# Patient Record
Sex: FEMALE | Race: BLACK OR AFRICAN AMERICAN | NOT HISPANIC OR LATINO
[De-identification: names, ages, dates, MRNs, and addresses within clinical notes are randomized per-mention and may not be internally consistent; named-entity substitution may affect disease eponyms.]

---

## 2017-05-04 ENCOUNTER — APPOINTMENT (OUTPATIENT)
Dept: NEPHROLOGY | Facility: CLINIC | Age: 52
End: 2017-05-04

## 2017-05-04 VITALS
DIASTOLIC BLOOD PRESSURE: 70 MMHG | BODY MASS INDEX: 34.07 KG/M2 | TEMPERATURE: 97.8 F | RESPIRATION RATE: 16 BRPM | HEART RATE: 88 BPM | SYSTOLIC BLOOD PRESSURE: 104 MMHG | WEIGHT: 230 LBS | HEIGHT: 69 IN

## 2017-05-04 VITALS — SYSTOLIC BLOOD PRESSURE: 108 MMHG | DIASTOLIC BLOOD PRESSURE: 66 MMHG

## 2017-05-07 LAB
ALBUMIN SERPL ELPH-MCNC: 3.8 G/DL
ANION GAP SERPL CALC-SCNC: 14 MMOL/L
BUN SERPL-MCNC: 12 MG/DL
CALCIUM SERPL-MCNC: 9.6 MG/DL
CHLORIDE SERPL-SCNC: 101 MMOL/L
CO2 SERPL-SCNC: 26 MMOL/L
CREAT SERPL-MCNC: 1.06 MG/DL
CREAT SPEC-SCNC: 234 MG/DL
CREAT/PROT UR: 0.1 RATIO
GLUCOSE SERPL-MCNC: 74 MG/DL
PHOSPHATE SERPL-MCNC: 2 MG/DL
POTASSIUM SERPL-SCNC: 3.5 MMOL/L
PROT UR-MCNC: 16 MG/DL
SODIUM SERPL-SCNC: 141 MMOL/L

## 2017-05-08 ENCOUNTER — RESULT REVIEW (OUTPATIENT)
Age: 52
End: 2017-05-08

## 2017-05-12 ENCOUNTER — APPOINTMENT (OUTPATIENT)
Dept: ORTHOPEDIC SURGERY | Facility: CLINIC | Age: 52
End: 2017-05-12

## 2017-05-12 VITALS — SYSTOLIC BLOOD PRESSURE: 116 MMHG | HEART RATE: 66 BPM | DIASTOLIC BLOOD PRESSURE: 74 MMHG

## 2017-05-12 VITALS — WEIGHT: 225 LBS | BODY MASS INDEX: 33.33 KG/M2 | HEIGHT: 69 IN

## 2017-05-12 DIAGNOSIS — S56.912A STRAIN OF UNSPECIFIED MUSCLES, FASCIA AND TENDONS AT FOREARM LEVEL, LEFT ARM, INITIAL ENCOUNTER: ICD-10-CM

## 2017-08-21 ENCOUNTER — APPOINTMENT (OUTPATIENT)
Dept: PAIN MANAGEMENT | Facility: CLINIC | Age: 52
End: 2017-08-21
Payer: COMMERCIAL

## 2017-08-21 VITALS
WEIGHT: 225 LBS | BODY MASS INDEX: 33.33 KG/M2 | HEIGHT: 69 IN | DIASTOLIC BLOOD PRESSURE: 76 MMHG | HEART RATE: 67 BPM | SYSTOLIC BLOOD PRESSURE: 145 MMHG

## 2017-08-21 PROCEDURE — 99204 OFFICE O/P NEW MOD 45 MIN: CPT

## 2017-08-25 ENCOUNTER — TRANSCRIPTION ENCOUNTER (OUTPATIENT)
Age: 52
End: 2017-08-25

## 2017-09-25 ENCOUNTER — APPOINTMENT (OUTPATIENT)
Dept: MAMMOGRAPHY | Facility: IMAGING CENTER | Age: 52
End: 2017-09-25
Payer: COMMERCIAL

## 2017-09-25 ENCOUNTER — OUTPATIENT (OUTPATIENT)
Dept: OUTPATIENT SERVICES | Facility: HOSPITAL | Age: 52
LOS: 1 days | End: 2017-09-25
Payer: COMMERCIAL

## 2017-09-25 ENCOUNTER — APPOINTMENT (OUTPATIENT)
Dept: ULTRASOUND IMAGING | Facility: IMAGING CENTER | Age: 52
End: 2017-09-25
Payer: COMMERCIAL

## 2017-09-25 DIAGNOSIS — Z00.8 ENCOUNTER FOR OTHER GENERAL EXAMINATION: ICD-10-CM

## 2017-09-25 PROCEDURE — G0202: CPT | Mod: 26

## 2017-09-25 PROCEDURE — 77063 BREAST TOMOSYNTHESIS BI: CPT | Mod: 26

## 2017-09-25 PROCEDURE — 77067 SCR MAMMO BI INCL CAD: CPT

## 2017-09-25 PROCEDURE — 76641 ULTRASOUND BREAST COMPLETE: CPT | Mod: 26,50

## 2017-09-25 PROCEDURE — 76641 ULTRASOUND BREAST COMPLETE: CPT

## 2017-09-25 PROCEDURE — 77063 BREAST TOMOSYNTHESIS BI: CPT

## 2017-10-02 ENCOUNTER — APPOINTMENT (OUTPATIENT)
Dept: PAIN MANAGEMENT | Facility: CLINIC | Age: 52
End: 2017-10-02
Payer: COMMERCIAL

## 2017-10-02 VITALS
BODY MASS INDEX: 33.33 KG/M2 | HEART RATE: 77 BPM | HEIGHT: 69 IN | WEIGHT: 225 LBS | SYSTOLIC BLOOD PRESSURE: 123 MMHG | DIASTOLIC BLOOD PRESSURE: 86 MMHG

## 2017-10-02 PROCEDURE — 99213 OFFICE O/P EST LOW 20 MIN: CPT

## 2017-10-17 ENCOUNTER — CHART COPY (OUTPATIENT)
Age: 52
End: 2017-10-17

## 2017-10-28 ENCOUNTER — OUTPATIENT (OUTPATIENT)
Dept: OUTPATIENT SERVICES | Facility: HOSPITAL | Age: 52
LOS: 1 days | End: 2017-10-28
Payer: COMMERCIAL

## 2017-10-28 ENCOUNTER — APPOINTMENT (OUTPATIENT)
Dept: MRI IMAGING | Facility: CLINIC | Age: 52
End: 2017-10-28
Payer: COMMERCIAL

## 2017-10-28 DIAGNOSIS — Z00.8 ENCOUNTER FOR OTHER GENERAL EXAMINATION: ICD-10-CM

## 2017-10-28 PROCEDURE — 72148 MRI LUMBAR SPINE W/O DYE: CPT | Mod: 26

## 2017-10-28 PROCEDURE — 72148 MRI LUMBAR SPINE W/O DYE: CPT

## 2017-12-06 ENCOUNTER — APPOINTMENT (OUTPATIENT)
Dept: PAIN MANAGEMENT | Facility: CLINIC | Age: 52
End: 2017-12-06
Payer: COMMERCIAL

## 2017-12-06 VITALS
WEIGHT: 216 LBS | DIASTOLIC BLOOD PRESSURE: 85 MMHG | HEIGHT: 66 IN | SYSTOLIC BLOOD PRESSURE: 135 MMHG | BODY MASS INDEX: 34.72 KG/M2 | HEART RATE: 77 BPM

## 2017-12-06 PROCEDURE — 99214 OFFICE O/P EST MOD 30 MIN: CPT

## 2017-12-13 ENCOUNTER — APPOINTMENT (OUTPATIENT)
Dept: PAIN MANAGEMENT | Facility: CLINIC | Age: 52
End: 2017-12-13

## 2018-01-22 ENCOUNTER — APPOINTMENT (OUTPATIENT)
Dept: ORTHOPEDIC SURGERY | Facility: CLINIC | Age: 53
End: 2018-01-22
Payer: COMMERCIAL

## 2018-01-22 VITALS
HEIGHT: 69 IN | DIASTOLIC BLOOD PRESSURE: 82 MMHG | HEART RATE: 74 BPM | SYSTOLIC BLOOD PRESSURE: 140 MMHG | WEIGHT: 216 LBS | BODY MASS INDEX: 31.99 KG/M2

## 2018-01-22 PROCEDURE — 99214 OFFICE O/P EST MOD 30 MIN: CPT

## 2018-06-15 ENCOUNTER — APPOINTMENT (OUTPATIENT)
Dept: GASTROENTEROLOGY | Facility: CLINIC | Age: 53
End: 2018-06-15
Payer: COMMERCIAL

## 2018-06-15 VITALS
BODY MASS INDEX: 34.07 KG/M2 | OXYGEN SATURATION: 98 % | WEIGHT: 230 LBS | DIASTOLIC BLOOD PRESSURE: 80 MMHG | RESPIRATION RATE: 16 BRPM | TEMPERATURE: 98 F | HEART RATE: 88 BPM | SYSTOLIC BLOOD PRESSURE: 130 MMHG | HEIGHT: 69 IN

## 2018-06-15 DIAGNOSIS — Z80.1 FAMILY HISTORY OF MALIGNANT NEOPLASM OF TRACHEA, BRONCHUS AND LUNG: ICD-10-CM

## 2018-06-15 PROCEDURE — 99213 OFFICE O/P EST LOW 20 MIN: CPT

## 2018-06-29 ENCOUNTER — APPOINTMENT (OUTPATIENT)
Dept: GASTROENTEROLOGY | Facility: CLINIC | Age: 53
End: 2018-06-29
Payer: COMMERCIAL

## 2018-06-29 VITALS
OXYGEN SATURATION: 99 % | WEIGHT: 230 LBS | HEART RATE: 68 BPM | HEIGHT: 69 IN | BODY MASS INDEX: 34.07 KG/M2 | DIASTOLIC BLOOD PRESSURE: 70 MMHG | SYSTOLIC BLOOD PRESSURE: 130 MMHG | TEMPERATURE: 98.7 F

## 2018-06-29 PROCEDURE — 99213 OFFICE O/P EST LOW 20 MIN: CPT

## 2018-08-01 ENCOUNTER — RECORD ABSTRACTING (OUTPATIENT)
Age: 53
End: 2018-08-01

## 2018-08-08 ENCOUNTER — RX RENEWAL (OUTPATIENT)
Age: 53
End: 2018-08-08

## 2018-08-21 ENCOUNTER — APPOINTMENT (OUTPATIENT)
Dept: PULMONOLOGY | Facility: CLINIC | Age: 53
End: 2018-08-21
Payer: COMMERCIAL

## 2018-08-21 VITALS
DIASTOLIC BLOOD PRESSURE: 85 MMHG | BODY MASS INDEX: 34.07 KG/M2 | RESPIRATION RATE: 16 BRPM | WEIGHT: 230 LBS | HEIGHT: 69 IN | SYSTOLIC BLOOD PRESSURE: 141 MMHG | HEART RATE: 71 BPM | TEMPERATURE: 98.2 F | OXYGEN SATURATION: 100 %

## 2018-08-21 LAB
ALBUMIN: 80
BILIRUB UR QL STRIP: NEGATIVE
CLARITY UR: CLEAR
COLLECTION METHOD: NORMAL
CREATININE: 300
GLUCOSE UR-MCNC: NEGATIVE
HCG UR QL: 0.2 EU/DL
HGB UR QL STRIP.AUTO: NEGATIVE
KETONES UR-MCNC: NEGATIVE
LEUKOCYTE ESTERASE UR QL STRIP: NEGATIVE
MICROALBUMIN/CREAT UR TEST STR-RTO: NORMAL
NITRITE UR QL STRIP: NEGATIVE
PH UR STRIP: 5.5
PROT UR STRIP-MCNC: 30
SP GR UR STRIP: 1.03

## 2018-08-21 PROCEDURE — 99396 PREV VISIT EST AGE 40-64: CPT | Mod: 25

## 2018-08-21 PROCEDURE — 71046 X-RAY EXAM CHEST 2 VIEWS: CPT

## 2018-08-21 PROCEDURE — 94060 EVALUATION OF WHEEZING: CPT

## 2018-08-21 PROCEDURE — 82044 UR ALBUMIN SEMIQUANTITATIVE: CPT | Mod: QW

## 2018-08-21 PROCEDURE — 81002 URINALYSIS NONAUTO W/O SCOPE: CPT

## 2018-08-21 PROCEDURE — 93000 ELECTROCARDIOGRAM COMPLETE: CPT

## 2018-08-21 PROCEDURE — 36415 COLL VENOUS BLD VENIPUNCTURE: CPT

## 2018-08-21 PROCEDURE — 82570 ASSAY OF URINE CREATININE: CPT | Mod: QW

## 2018-08-21 PROCEDURE — 77080 DXA BONE DENSITY AXIAL: CPT

## 2018-08-21 RX ORDER — HYDROCORTISONE ACETATE, PRAMOXINE HCL 2.5; 1 G/100G; G/100G
2.5-1 CREAM TOPICAL 3 TIMES DAILY
Qty: 2 | Refills: 2 | Status: DISCONTINUED | COMMUNITY
Start: 2018-06-15 | End: 2018-08-21

## 2018-08-21 RX ORDER — PRAMOXINE HYDROCHLORIDE AND HYDROCORTISONE ACETATE 10; 25 MG/G; MG/G
2.5-1 CREAM TOPICAL
Qty: 60 | Refills: 0 | Status: DISCONTINUED | COMMUNITY
Start: 2018-06-15 | End: 2018-08-21

## 2018-08-22 LAB — 25(OH)D3 SERPL-MCNC: 29.1 NG/ML

## 2018-09-04 ENCOUNTER — RESULT CHARGE (OUTPATIENT)
Age: 53
End: 2018-09-04

## 2018-09-05 LAB — HEMOCCULT SP1 STL QL: NEGATIVE

## 2018-10-31 ENCOUNTER — APPOINTMENT (OUTPATIENT)
Dept: ULTRASOUND IMAGING | Facility: IMAGING CENTER | Age: 53
End: 2018-10-31
Payer: COMMERCIAL

## 2018-10-31 ENCOUNTER — OUTPATIENT (OUTPATIENT)
Dept: OUTPATIENT SERVICES | Facility: HOSPITAL | Age: 53
LOS: 1 days | End: 2018-10-31
Payer: COMMERCIAL

## 2018-10-31 ENCOUNTER — APPOINTMENT (OUTPATIENT)
Dept: MAMMOGRAPHY | Facility: IMAGING CENTER | Age: 53
End: 2018-10-31
Payer: COMMERCIAL

## 2018-10-31 DIAGNOSIS — R92.2 INCONCLUSIVE MAMMOGRAM: ICD-10-CM

## 2018-10-31 DIAGNOSIS — Z12.31 ENCOUNTER FOR SCREENING MAMMOGRAM FOR MALIGNANT NEOPLASM OF BREAST: ICD-10-CM

## 2018-10-31 PROCEDURE — 76641 ULTRASOUND BREAST COMPLETE: CPT | Mod: 26,50

## 2018-10-31 PROCEDURE — 77063 BREAST TOMOSYNTHESIS BI: CPT

## 2018-10-31 PROCEDURE — 77063 BREAST TOMOSYNTHESIS BI: CPT | Mod: 26

## 2018-10-31 PROCEDURE — 76641 ULTRASOUND BREAST COMPLETE: CPT

## 2018-10-31 PROCEDURE — 77067 SCR MAMMO BI INCL CAD: CPT | Mod: 26

## 2018-10-31 PROCEDURE — 77067 SCR MAMMO BI INCL CAD: CPT

## 2018-11-27 ENCOUNTER — APPOINTMENT (OUTPATIENT)
Dept: PULMONOLOGY | Facility: CLINIC | Age: 53
End: 2018-11-27
Payer: COMMERCIAL

## 2018-11-27 VITALS
OXYGEN SATURATION: 100 % | RESPIRATION RATE: 16 BRPM | HEART RATE: 72 BPM | DIASTOLIC BLOOD PRESSURE: 89 MMHG | SYSTOLIC BLOOD PRESSURE: 137 MMHG

## 2018-11-27 LAB — POCT - HEMOGLOBIN (HGB), QUANTITATIVE, TRANSCUTANEOUS: 9.6

## 2018-11-27 PROCEDURE — 88738 HGB QUANT TRANSCUTANEOUS: CPT

## 2018-11-27 PROCEDURE — 94727 GAS DIL/WSHOT DETER LNG VOL: CPT

## 2018-11-27 PROCEDURE — 36415 COLL VENOUS BLD VENIPUNCTURE: CPT

## 2018-11-27 PROCEDURE — 90686 IIV4 VACC NO PRSV 0.5 ML IM: CPT

## 2018-11-27 PROCEDURE — 94729 DIFFUSING CAPACITY: CPT

## 2018-11-27 PROCEDURE — G0008: CPT

## 2018-11-27 PROCEDURE — 94060 EVALUATION OF WHEEZING: CPT

## 2018-11-27 PROCEDURE — 99214 OFFICE O/P EST MOD 30 MIN: CPT | Mod: 25

## 2018-11-27 RX ORDER — LOSARTAN POTASSIUM 100 MG/1
100 TABLET, FILM COATED ORAL DAILY
Qty: 90 | Refills: 3 | Status: DISCONTINUED | COMMUNITY
Start: 2017-08-07 | End: 2018-11-27

## 2018-11-27 RX ORDER — BUDESONIDE AND FORMOTEROL FUMARATE DIHYDRATE 80; 4.5 UG/1; UG/1
80-4.5 AEROSOL RESPIRATORY (INHALATION) TWICE DAILY
Refills: 0 | Status: DISCONTINUED | COMMUNITY
End: 2018-11-27

## 2018-11-28 LAB
FERRITIN SERPL-MCNC: 225 NG/ML
FOLATE SERPL-MCNC: 13 NG/ML
IRON SATN MFR SERPL: 43 %
IRON SERPL-MCNC: 127 UG/DL
TIBC SERPL-MCNC: 294 UG/DL
UIBC SERPL-MCNC: 167 UG/DL
VIT B12 SERPL-MCNC: 355 PG/ML

## 2018-12-21 ENCOUNTER — APPOINTMENT (OUTPATIENT)
Dept: ORTHOPEDIC SURGERY | Facility: CLINIC | Age: 53
End: 2018-12-21
Payer: COMMERCIAL

## 2018-12-21 VITALS
DIASTOLIC BLOOD PRESSURE: 64 MMHG | SYSTOLIC BLOOD PRESSURE: 110 MMHG | WEIGHT: 230 LBS | HEIGHT: 69 IN | BODY MASS INDEX: 34.07 KG/M2 | HEART RATE: 88 BPM

## 2018-12-21 PROCEDURE — 99214 OFFICE O/P EST MOD 30 MIN: CPT

## 2019-01-02 ENCOUNTER — OTHER (OUTPATIENT)
Age: 54
End: 2019-01-02

## 2019-01-04 ENCOUNTER — FORM ENCOUNTER (OUTPATIENT)
Age: 54
End: 2019-01-04

## 2019-01-04 ENCOUNTER — APPOINTMENT (OUTPATIENT)
Dept: PULMONOLOGY | Facility: CLINIC | Age: 54
End: 2019-01-04
Payer: COMMERCIAL

## 2019-01-04 VITALS
DIASTOLIC BLOOD PRESSURE: 86 MMHG | RESPIRATION RATE: 16 BRPM | TEMPERATURE: 98.6 F | OXYGEN SATURATION: 100 % | HEART RATE: 79 BPM | SYSTOLIC BLOOD PRESSURE: 139 MMHG

## 2019-01-04 PROCEDURE — 99213 OFFICE O/P EST LOW 20 MIN: CPT | Mod: 25

## 2019-01-04 PROCEDURE — 94060 EVALUATION OF WHEEZING: CPT

## 2019-01-05 ENCOUNTER — APPOINTMENT (OUTPATIENT)
Dept: MRI IMAGING | Facility: CLINIC | Age: 54
End: 2019-01-05

## 2019-01-05 ENCOUNTER — OUTPATIENT (OUTPATIENT)
Dept: OUTPATIENT SERVICES | Facility: HOSPITAL | Age: 54
LOS: 1 days | End: 2019-01-05
Payer: COMMERCIAL

## 2019-01-05 ENCOUNTER — APPOINTMENT (OUTPATIENT)
Dept: MRI IMAGING | Facility: CLINIC | Age: 54
End: 2019-01-05
Payer: COMMERCIAL

## 2019-01-05 DIAGNOSIS — M54.5 LOW BACK PAIN: ICD-10-CM

## 2019-01-05 PROCEDURE — 72148 MRI LUMBAR SPINE W/O DYE: CPT

## 2019-01-05 PROCEDURE — 72148 MRI LUMBAR SPINE W/O DYE: CPT | Mod: 26

## 2019-01-11 DIAGNOSIS — M47.816 SPONDYLOSIS W/OUT MYELOPATHY OR RADICULOPATHY, LUMBAR REGION: ICD-10-CM

## 2019-01-14 ENCOUNTER — RX RENEWAL (OUTPATIENT)
Age: 54
End: 2019-01-14

## 2019-02-08 ENCOUNTER — RX RENEWAL (OUTPATIENT)
Age: 54
End: 2019-02-08

## 2019-02-08 ENCOUNTER — APPOINTMENT (OUTPATIENT)
Dept: PULMONOLOGY | Facility: CLINIC | Age: 54
End: 2019-02-08
Payer: COMMERCIAL

## 2019-02-08 VITALS
BODY MASS INDEX: 34.07 KG/M2 | WEIGHT: 230 LBS | HEIGHT: 69 IN | TEMPERATURE: 98.1 F | DIASTOLIC BLOOD PRESSURE: 98 MMHG | OXYGEN SATURATION: 100 % | SYSTOLIC BLOOD PRESSURE: 130 MMHG | HEART RATE: 73 BPM

## 2019-02-08 PROCEDURE — 99214 OFFICE O/P EST MOD 30 MIN: CPT | Mod: 25

## 2019-02-08 PROCEDURE — 94060 EVALUATION OF WHEEZING: CPT

## 2019-02-08 RX ORDER — AZITHROMYCIN 250 MG/1
250 TABLET, FILM COATED ORAL
Qty: 1 | Refills: 1 | Status: DISCONTINUED | COMMUNITY
Start: 2019-01-04 | End: 2019-02-08

## 2019-02-09 ENCOUNTER — RX RENEWAL (OUTPATIENT)
Age: 54
End: 2019-02-09

## 2019-02-09 LAB
ALBUMIN SERPL ELPH-MCNC: 4.3 G/DL
ALP BLD-CCNC: 72 U/L
ALT SERPL-CCNC: 19 U/L
ANION GAP SERPL CALC-SCNC: 11 MMOL/L
AST SERPL-CCNC: 19 U/L
BASOPHILS # BLD AUTO: 0.02 K/UL
BASOPHILS NFR BLD AUTO: 0.4 %
BILIRUB SERPL-MCNC: 0.4 MG/DL
BUN SERPL-MCNC: 11 MG/DL
CALCIUM SERPL-MCNC: 9.3 MG/DL
CHLORIDE SERPL-SCNC: 108 MMOL/L
CO2 SERPL-SCNC: 26 MMOL/L
CREAT SERPL-MCNC: 0.78 MG/DL
EOSINOPHIL # BLD AUTO: 0.09 K/UL
EOSINOPHIL NFR BLD AUTO: 1.8 %
GLUCOSE SERPL-MCNC: 94 MG/DL
HCT VFR BLD CALC: 36.1 %
HGB BLD-MCNC: 10.7 G/DL
IMM GRANULOCYTES NFR BLD AUTO: 0 %
LYMPHOCYTES # BLD AUTO: 1.8 K/UL
LYMPHOCYTES NFR BLD AUTO: 35.1 %
MAN DIFF?: NORMAL
MCHC RBC-ENTMCNC: 28.4 PG
MCHC RBC-ENTMCNC: 29.6 GM/DL
MCV RBC AUTO: 95.8 FL
MONOCYTES # BLD AUTO: 0.41 K/UL
MONOCYTES NFR BLD AUTO: 8 %
NEUTROPHILS # BLD AUTO: 2.81 K/UL
NEUTROPHILS NFR BLD AUTO: 54.7 %
PLATELET # BLD AUTO: 276 K/UL
POTASSIUM SERPL-SCNC: 4.4 MMOL/L
PROT SERPL-MCNC: 7.1 G/DL
RBC # BLD: 3.77 M/UL
RBC # FLD: 13.8 %
SODIUM SERPL-SCNC: 145 MMOL/L
WBC # FLD AUTO: 5.13 K/UL

## 2019-02-09 NOTE — PHYSICAL EXAM
[General Appearance - Well Developed] : well developed [Normal Appearance] : normal appearance [Well Groomed] : well groomed [General Appearance - Well Nourished] : well nourished [No Deformities] : no deformities [General Appearance - In No Acute Distress] : no acute distress [Normal Conjunctiva] : the conjunctiva exhibited no abnormalities [Eyelids - No Xanthelasma] : the eyelids demonstrated no xanthelasmas [Normal Oropharynx] : normal oropharynx [II] : II [Neck Appearance] : the appearance of the neck was normal [Neck Cervical Mass (___cm)] : no neck mass was observed [Jugular Venous Distention Increased] : there was no jugular-venous distention [Thyroid Diffuse Enlargement] : the thyroid was not enlarged [Heart Rate And Rhythm] : heart rate and rhythm were normal [Heart Sounds] : normal S1 and S2 [Murmurs] : no murmurs present [Arterial Pulses Normal] : the arterial pulses were normal [Edema] : no peripheral edema present [Veins - Varicosity Changes] : no varicosital changes were noted in the lower extremities [Respiration, Rhythm And Depth] : normal respiratory rhythm and effort [Exaggerated Use Of Accessory Muscles For Inspiration] : no accessory muscle use [Auscultation Breath Sounds / Voice Sounds] : lungs were clear to auscultation bilaterally [Chest Palpation] : palpation of the chest revealed no abnormalities [Lungs Percussion] : the lungs were normal to percussion [Abdomen Soft] : soft [Abdomen Tenderness] : non-tender [Abdomen Mass (___ Cm)] : no abdominal mass palpated [Abnormal Walk] : normal gait [Gait - Sufficient For Exercise Testing] : the gait was sufficient for exercise testing [Nail Clubbing] : no clubbing of the fingernails [Cyanosis, Localized] : no localized cyanosis [Petechial Hemorrhages (___cm)] : no petechial hemorrhages [Skin Color & Pigmentation] : normal skin color and pigmentation [] : no rash [No Venous Stasis] : no venous stasis [Skin Lesions] : no skin lesions [No Skin Ulcers] : no skin ulcer [No Xanthoma] : no  xanthoma was observed [Deep Tendon Reflexes (DTR)] : deep tendon reflexes were 2+ and symmetric [Sensation] : the sensory exam was normal to light touch and pinprick [No Focal Deficits] : no focal deficits [Oriented To Time, Place, And Person] : oriented to person, place, and time [Impaired Insight] : insight and judgment were intact [Affect] : the affect was normal [FreeTextEntry1] : non icteric

## 2019-02-09 NOTE — HISTORY OF PRESENT ILLNESS
[Stable] : are stable [None] : ~He/She~ has no significant interval events [Difficulty Breathing During Exertion] : stable dyspnea on exertion [Feelings Of Weakness On Exertion] : stable exercise intolerance [Cough] : denies coughing [Wheezing] : denies wheezing [Regional Soft Tissue Swelling Both Lower Extremities] : denies lower extremity edema [Chest Pain Or Discomfort] : denies chest pain [Fever] : denies fever [Obstructive Sleep Apnea] : obstructive sleep apnea [Date: ___] : Date of most recent diagnostic polysomnogram: [unfilled] [AHI: ___ per hour] : Apnea-hypopnea index:  [unfilled] per hour [Wt Gain ___ Lbs] : no recent weight gain [Wt Loss ___ Lbs] : no recent weight loss [Oxygen] : the patient uses no supplemental oxygen [Nocturnal Oxygen] : The patient does not use nocturnal oxygen [FreeTextEntry1] : Reviewed prior blood work\par Last CBC June 4, 2018\par WBC 6.12 hemoglobin 11.4 hematocrit 36.4 platelet 284,000\par Thyroid function testing was normal\par Vitamin D 18.7 and noted interval improvement to 29.1\par TFT profile normal\par Cholesterol 173\par Electrolytes normal creatinine 0.95 liver function tests normal\par \par Noted recall on Hyzaar\par  held x 1 week

## 2019-02-09 NOTE — REVIEW OF SYSTEMS
[Nasal Congestion] : nasal congestion [Sinus Problems] : sinus problems [As Noted in HPI] : as noted in HPI [Hypertension] : ~T hypertension [Back Pain] : ~T back pain [Negative] : Pulmonary Hypertension [Ear Disturbance] : no ear disturbance [Epistaxis] : no nosebleeds [Postnasal Drip] : no postnasal drip [Sore Throat] : no sore throat [Dry Mouth] : no dry mouth [PND] : no PND [Orthopnea] : no orthopnea [Palpitations] : no palpitations [Edema] : ~T edema was not present [Claudication] : no intermittent claudication [Leg Cramps] : no leg cramps [FreeTextEntry5] : colonoscopy 2016 [FreeTextEntry6] : management Dr Abraham Escalante

## 2019-02-09 NOTE — PROCEDURE
[FreeTextEntry1] : FENO 45 ppb and prior 76 ppb\par \par PFT Nov 27 2018\par Mild  reduction flow rates 12% BD at FEV1\par Mild restrictive impairment with TLC 73%\par  Normal Diffusion\par HGB 9.6\par \par Spirometry 2/8/19\par Mild OAD\par No  BD at FEV!\par stable flow rates\par \par CPAP data compliance report up to February 6, 2019\par Usage 77%\par Hours average greater than 5\par Setting 6-18 cm H2O\par AHI 1.3\par \par \par Prior Visits\par Bone density normal study AP spine and femoral neck total hip wall normal no evidence of osteopenia or osteoporosis\par \par Chest x-ray PA and lateral projection Nov 2018\par Cardiac size is normal\par Lung fields are grossly clear without consolidation dominant nodules.\par No pneumothorax.\par No pleural effusions identified\par Hilum and mediastinum is grossly normal.\par Soft tissue bony structures grossly normal\par Comparison to September 15, 2017 demonstrates no change in the chest x-ray\par

## 2019-02-09 NOTE — DISCUSSION/SUMMARY
[FreeTextEntry1] : Mild asthma need daily compliance with use Symbicort and rinse\par    Hypertension borderline-  Bhboac960-55.5 mg Stopped \par Changed Benicar-HCTA 40-12 mg 1 po QD\par Proteinuria with history mild renal insufficiency\par URI acute\par \par LEONARD- AUTO CPAP  6 - 18 cm H20\par Back pain -management Ortho Dr Escalante\par labs ordered\par  Vit D hx insuff and last Vit D 29.1\par  Proair before swimming 2 puffs\par  singulair 10  QD with food\par Hyzaar and changed Benicar with HCTZ\par Anemia w/u and noted stool negative heme\par \par \par \par Mammogram  prior management with GYN Dr Sheets and due Mammogram Sept 2019\par  pt set reminder- Completed and per patient negative\par \par CBC SMA 20\par f/u RENAl for proteinuria\par \par Patient compliant with CPAP therapy.  Continue present settings.  Patient with demonstrated clinical benefit with daytime and nocturnal symptomatology improvement.\par \par Data review February 9, 2019\par CBC White blood count 5.13 hemoglobin 10.7 hematocrit 36.1 platelet count 276,000\par Serum electrolytes liver function tests normal

## 2019-03-07 ENCOUNTER — APPOINTMENT (OUTPATIENT)
Dept: PULMONOLOGY | Facility: CLINIC | Age: 54
End: 2019-03-07
Payer: COMMERCIAL

## 2019-03-07 ENCOUNTER — NON-APPOINTMENT (OUTPATIENT)
Age: 54
End: 2019-03-07

## 2019-03-07 VITALS
TEMPERATURE: 99.1 F | OXYGEN SATURATION: 97 % | HEART RATE: 77 BPM | DIASTOLIC BLOOD PRESSURE: 82 MMHG | SYSTOLIC BLOOD PRESSURE: 131 MMHG

## 2019-03-07 DIAGNOSIS — J06.9 ACUTE UPPER RESPIRATORY INFECTION, UNSPECIFIED: ICD-10-CM

## 2019-03-07 LAB
FLUAV SPEC QL CULT: NORMAL
FLUBV AG SPEC QL IA: NORMAL

## 2019-03-07 PROCEDURE — 71046 X-RAY EXAM CHEST 2 VIEWS: CPT

## 2019-03-07 PROCEDURE — 87880 STREP A ASSAY W/OPTIC: CPT | Mod: QW

## 2019-03-07 PROCEDURE — 87804 INFLUENZA ASSAY W/OPTIC: CPT | Mod: QW

## 2019-03-07 PROCEDURE — 94060 EVALUATION OF WHEEZING: CPT

## 2019-03-07 PROCEDURE — 99214 OFFICE O/P EST MOD 30 MIN: CPT | Mod: 25

## 2019-03-07 RX ORDER — AZELASTINE HYDROCHLORIDE 137 UG/1
137 SPRAY, METERED NASAL TWICE DAILY
Refills: 0 | Status: DISCONTINUED | COMMUNITY
End: 2019-03-07

## 2019-03-07 RX ORDER — LOSARTAN POTASSIUM AND HYDROCHLOROTHIAZIDE 12.5; 1 MG/1; MG/1
100-12.5 TABLET ORAL
Qty: 90 | Refills: 1 | Status: DISCONTINUED | COMMUNITY
Start: 2018-11-27 | End: 2019-03-07

## 2019-03-07 RX ORDER — NYSTATIN 100000 U/G
100000 OINTMENT TOPICAL
Qty: 30 | Refills: 0 | Status: DISCONTINUED | COMMUNITY
Start: 2018-10-30

## 2019-03-07 NOTE — DISCUSSION/SUMMARY
[FreeTextEntry1] : Flu-like illness-URI\par Mild asthma need daily compliance with use Symbicort and rinse\par    Hypertension borderline-  Ufupzf174-10.5 mg Stopped \par Changed Benicar-HCTA 40-12 mg 1 po QD\par Proteinuria with history mild renal insufficiency\par Z BIBI\par \par LEONARD- AUTO CPAP  6 - 18 cm H20\par Back pain -management Ortho Dr Escalante\par labs ordered\par  Vit D hx insuff and last Vit D 29.1\par  Proair before swimming 2 puffs\par  singulair 10  QD with food\par Hyzaar and changed Benicar with HCTZ\par Anemia w/u and noted stool negative heme\par \par \par \par Mammogram  prior management with GYN Dr Sheets and due Mammogram Sept 2019\par  pt set reminder- Completed and per patient negative\par \par CBC SMA 20\par f/u RENAl for proteinuria\par \par Patient compliant with CPAP therapy.  Continue present settings.  Patient with demonstrated clinical benefit with daytime and nocturnal symptomatology improvement.\par \par Data review February 9, 2019\par CBC White blood count 5.13 hemoglobin 10.7 hematocrit 36.1 platelet count 276,000\par Serum electrolytes liver function tests normal

## 2019-03-07 NOTE — PROCEDURE
[FreeTextEntry1] : Chest x-ray PA lateral projection March 7, 2019\par Cardiac size grossly normal\par Some atelectatic change right mid lung zone\par Cannot exclude nodularity right lateral lung zone\par No pleural effusions no thorax\par Chest x-ray of August 21, 2018 no interval change\par \par Spirometry March 7, 2019 mild obstructive ventilatory impairment\par 12% response to inhaled bronchodilator at FEV1\par \par Rapid strep negative\par Rapid flu negative\par

## 2019-03-07 NOTE — REASON FOR VISIT
[Follow-Up] : a follow-up visit [Asthma] : asthma [Cough] : cough [Sleep Apnea] : sleep apnea [FreeTextEntry2] : Flu Like SXS

## 2019-04-04 ENCOUNTER — APPOINTMENT (OUTPATIENT)
Dept: PULMONOLOGY | Facility: CLINIC | Age: 54
End: 2019-04-04
Payer: COMMERCIAL

## 2019-04-04 VITALS
RESPIRATION RATE: 16 BRPM | SYSTOLIC BLOOD PRESSURE: 104 MMHG | OXYGEN SATURATION: 98 % | TEMPERATURE: 98.4 F | HEART RATE: 84 BPM | DIASTOLIC BLOOD PRESSURE: 70 MMHG

## 2019-04-04 PROCEDURE — 94060 EVALUATION OF WHEEZING: CPT

## 2019-04-04 PROCEDURE — 99214 OFFICE O/P EST MOD 30 MIN: CPT | Mod: 25

## 2019-04-04 RX ORDER — AZITHROMYCIN 250 MG/1
250 TABLET, FILM COATED ORAL
Qty: 1 | Refills: 0 | Status: DISCONTINUED | COMMUNITY
Start: 2019-03-07 | End: 2019-04-04

## 2019-04-04 NOTE — DISCUSSION/SUMMARY
[FreeTextEntry1] : \par Mild asthma need daily compliance with use Symbicort and rinse\par    Hypertension borderline\par Proteinuria with history mild renal insufficiency\par Obstructive sleep apnea on CPAP\par To increase daily usage and hours of auto CPAP\par Patient compliant with CPAP therapy.  Continue present settings.  Patient with demonstrated clinical benefit with daytime and nocturnal symptomatology improvement.\par \par \par Benicar 40-12.5 mg po QD\par continue singulair 10 mg QD\par Symbicort  and prn Proair as noted\par LEONARD- AUTO CPAP  6 - 18 cm H20\par Back pain -management Ortho Dr Escalante\par f/u CXR \par  Vit D hx insuff and last Vit D 29.1\par  Proair before swimming 2 puffs\par  singulair 10  QD with food\par Hyzaar and changed Benicar with HCTZ\par Anemia w/u and noted stool negative [id number] nephrology\par \par \par Mammogram  prior management with GYN Dr Sheets and due Mammogram Sept 2019\par  pt set reminder- Completed and per patient negative\par \par f/u RENAl for proteinuria\par \par Data review February 9, 2019\par CBC White blood count 5.13 hemoglobin 10.7 hematocrit 36.1 platelet count 276,000\par Serum electrolytes liver function tests normal

## 2019-04-04 NOTE — PROCEDURE
[FreeTextEntry1] : Chest x-ray PA lateral projection March 7, 2019\par Cardiac size grossly normal\par Some atelectatic change right mid lung zone\par Cannot exclude nodularity right lateral lung zone\par No pleural effusions no thorax\par Chest x-ray of August 21, 2018 no interval change\par \par Spirometry March 7, 2019 mild obstructive ventilatory impairment\par 12% response to inhaled bronchodilator at FEV1\par \par Spirometry 4/4/2019\par  Interval improvement flow rates\par  Minimal OAD \par No BD at FEV1\par \par Data reviewed April 4, 2019\par Usage 57%\par Hours on average 6\par Order CPAP setting 6 this 18 cm H2O\par Average AHI 1.9

## 2019-04-04 NOTE — REASON FOR VISIT
[Follow-Up] : a follow-up visit [Asthma] : asthma [Sleep Apnea] : sleep apnea [FreeTextEntry2] : HTN

## 2019-05-05 ENCOUNTER — RX RENEWAL (OUTPATIENT)
Age: 54
End: 2019-05-05

## 2019-05-09 ENCOUNTER — APPOINTMENT (OUTPATIENT)
Dept: PULMONOLOGY | Facility: CLINIC | Age: 54
End: 2019-05-09
Payer: COMMERCIAL

## 2019-05-09 VITALS
RESPIRATION RATE: 16 BRPM | OXYGEN SATURATION: 100 % | DIASTOLIC BLOOD PRESSURE: 86 MMHG | SYSTOLIC BLOOD PRESSURE: 124 MMHG | HEART RATE: 107 BPM

## 2019-05-09 LAB — POCT - HEMOGLOBIN (HGB), QUANTITATIVE, TRANSCUTANEOUS: 10.6

## 2019-05-09 PROCEDURE — 99214 OFFICE O/P EST MOD 30 MIN: CPT | Mod: 25

## 2019-05-09 PROCEDURE — 94060 EVALUATION OF WHEEZING: CPT

## 2019-05-09 PROCEDURE — 88738 HGB QUANT TRANSCUTANEOUS: CPT

## 2019-05-09 NOTE — PHYSICAL EXAM
[General Appearance - Well Developed] : well developed [Normal Appearance] : normal appearance [Well Groomed] : well groomed [General Appearance - In No Acute Distress] : no acute distress [No Deformities] : no deformities [General Appearance - Well Nourished] : well nourished [Normal Conjunctiva] : the conjunctiva exhibited no abnormalities [Eyelids - No Xanthelasma] : the eyelids demonstrated no xanthelasmas [Normal Oropharynx] : normal oropharynx [II] : II [Neck Appearance] : the appearance of the neck was normal [Neck Cervical Mass (___cm)] : no neck mass was observed [Jugular Venous Distention Increased] : there was no jugular-venous distention [Heart Sounds] : normal S1 and S2 [Thyroid Diffuse Enlargement] : the thyroid was not enlarged [Heart Rate And Rhythm] : heart rate and rhythm were normal [Arterial Pulses Normal] : the arterial pulses were normal [Murmurs] : no murmurs present [Edema] : no peripheral edema present [Veins - Varicosity Changes] : no varicosital changes were noted in the lower extremities [Respiration, Rhythm And Depth] : normal respiratory rhythm and effort [Auscultation Breath Sounds / Voice Sounds] : lungs were clear to auscultation bilaterally [Exaggerated Use Of Accessory Muscles For Inspiration] : no accessory muscle use [Chest Palpation] : palpation of the chest revealed no abnormalities [Lungs Percussion] : the lungs were normal to percussion [Abdomen Soft] : soft [Abdomen Tenderness] : non-tender [Abdomen Mass (___ Cm)] : no abdominal mass palpated [Abnormal Walk] : normal gait [Gait - Sufficient For Exercise Testing] : the gait was sufficient for exercise testing [Nail Clubbing] : no clubbing of the fingernails [Petechial Hemorrhages (___cm)] : no petechial hemorrhages [Cyanosis, Localized] : no localized cyanosis [Skin Color & Pigmentation] : normal skin color and pigmentation [] : no rash [Skin Lesions] : no skin lesions [No Venous Stasis] : no venous stasis [No Skin Ulcers] : no skin ulcer [No Xanthoma] : no  xanthoma was observed [Deep Tendon Reflexes (DTR)] : deep tendon reflexes were 2+ and symmetric [Sensation] : the sensory exam was normal to light touch and pinprick [No Focal Deficits] : no focal deficits [Oriented To Time, Place, And Person] : oriented to person, place, and time [Impaired Insight] : insight and judgment were intact [Affect] : the affect was normal [FreeTextEntry1] : non icteric

## 2019-05-09 NOTE — PROCEDURE
[FreeTextEntry1] : Chest x-ray PA lateral projection March 7, 2019\par Cardiac size grossly normal\par Some atelectatic change right mid lung zone\par Cannot exclude nodularity right lateral lung zone\par No pleural effusions no thorax\par Chest x-ray of August 21, 2018 no interval change\par \par \par PFT5/9/2019\par Stable flow rates\par  Minimal OAD \par No BD at FEV1\par Normal lung volumes\par Normal diffusion\par Hemoglobin 10.6\par \par  CPAP) Data reviewed 5/9/19\par Usage 870 %\par Hours on average 5\par Order CPAP setting 6-18 cm H2O\par Average AHI 2.2

## 2019-05-09 NOTE — REVIEW OF SYSTEMS
[Sinus Problems] : sinus problems [As Noted in HPI] : as noted in HPI [Hypertension] : ~T hypertension [Back Pain] : ~T back pain [Negative] : Pulmonary Hypertension [Ear Disturbance] : no ear disturbance [Nasal Congestion] : no nasal congestion [Epistaxis] : no nosebleeds [Postnasal Drip] : no postnasal drip [Sore Throat] : no sore throat [Dry Mouth] : no dry mouth [PND] : no PND [Orthopnea] : no orthopnea [Palpitations] : no palpitations [Edema] : ~T edema was not present [Claudication] : no intermittent claudication [Leg Cramps] : no leg cramps [FreeTextEntry6] : management Dr Abraham Escalante [FreeTextEntry5] : colonoscopy 2016

## 2019-05-09 NOTE — DISCUSSION/SUMMARY
[FreeTextEntry1] : \par Mild asthma need daily compliance with use Symbicort and rinse\par    Hypertension borderline\par Proteinuria with history mild renal insufficiency\par \par Benicar 40-12.5 mg po QD\par continue singulair 10 mg QD\par Symbicort  and prn Proair as noted\par LEONARD- AUTO CPAP  6 - 18 cm H20\par Back pain -management Ortho Dr Escalante\par \par  Vit D hx insuff and last Vit D 29.1\par  Proair before swimming 2 puffs\par  singulair 10  QD with food\par Hyzaar and changed Benicar with HCTZ\par Anemia w/u and noted stool negative heme renal\par \par Recommendation patient to consider bariatric surgery\par Do believe her weight contributes to her sensation of shortness of breath\par Referral Cyrus Valiente MD\par \par Mammogram  prior management with GYN Dr Sheets and due Mammogram Sept 2019\par  pt set reminder- Completed and per patient negative\par \par f/u RENAl for proteinuria\par \par Patient compliant with CPAP therapy.  Continue present settings.  Patient with demonstrated clinical benefit with daytime and nocturnal symptomatology improvement.\par \par Data review February 9, 2019\par CBC White blood count 5.13 hemoglobin 10.7 hematocrit 36.1 platelet count 276,000\par Serum electrolytes liver function tests normal

## 2019-05-09 NOTE — HISTORY OF PRESENT ILLNESS
[Stable] : are stable [None] : ~He/She~ has no significant interval events [Difficulty Breathing During Exertion] : stable dyspnea on exertion [Feelings Of Weakness On Exertion] : stable exercise intolerance [Cough] : denies coughing [Wheezing] : denies wheezing [Regional Soft Tissue Swelling Both Lower Extremities] : denies lower extremity edema [Chest Pain Or Discomfort] : denies chest pain [Fever] : denies fever [Obstructive Sleep Apnea] : obstructive sleep apnea [Date: ___] : Date of most recent diagnostic polysomnogram: [unfilled] [AHI: ___ per hour] : Apnea-hypopnea index:  [unfilled] per hour [Wt Gain ___ Lbs] : no recent weight gain [Wt Loss ___ Lbs] : no recent weight loss [Oxygen] : the patient uses no supplemental oxygen [Nocturnal Oxygen] : The patient does not use nocturnal oxygen [FreeTextEntry1] : Reviewed prior blood work\par Last CBC June 4, 2018\par WBC 6.12 hemoglobin 11.4 hematocrit 36.4 platelet 284,000\par Thyroid function testing was normal\par Vitamin D 18.7 and noted interval improvement to 29.1\par TFT profile normal\par Cholesterol 173\par Electrolytes normal creatinine 0.95 liver function tests normal\par

## 2019-05-16 ENCOUNTER — APPOINTMENT (OUTPATIENT)
Dept: NEPHROLOGY | Facility: CLINIC | Age: 54
End: 2019-05-16
Payer: COMMERCIAL

## 2019-05-16 VITALS
BODY MASS INDEX: 35.55 KG/M2 | TEMPERATURE: 97.7 F | HEART RATE: 80 BPM | DIASTOLIC BLOOD PRESSURE: 82 MMHG | WEIGHT: 240 LBS | RESPIRATION RATE: 15 BRPM | SYSTOLIC BLOOD PRESSURE: 118 MMHG | OXYGEN SATURATION: 97 % | HEIGHT: 69 IN

## 2019-05-16 PROCEDURE — 99214 OFFICE O/P EST MOD 30 MIN: CPT

## 2019-05-16 RX ORDER — CHROMIUM 200 MCG
1000 TABLET ORAL DAILY
Qty: 30 | Refills: 0 | Status: DISCONTINUED | COMMUNITY
Start: 2018-08-22 | End: 2019-05-16

## 2019-05-16 NOTE — PHYSICAL EXAM
[General Appearance - Alert] : alert [General Appearance - In No Acute Distress] : in no acute distress [General Appearance - Well Nourished] : well nourished [General Appearance - Well Developed] : well developed [General Appearance - Well-Appearing] : healthy appearing [Sclera] : the sclera and conjunctiva were normal [Outer Ear] : the ears and nose were normal in appearance [Hearing Threshold Finger Rub Not [place]] : hearing was normal [Neck Appearance] : the appearance of the neck was normal [Respiration, Rhythm And Depth] : normal respiratory rhythm and effort [Exaggerated Use Of Accessory Muscles For Inspiration] : no accessory muscle use [Auscultation Breath Sounds / Voice Sounds] : lungs were clear to auscultation bilaterally [Heart Rate And Rhythm] : heart rate was normal and rhythm regular [Heart Sounds] : normal S1 and S2 [Heart Sounds Gallop] : no gallops [Heart Sounds Pericardial Friction Rub] : no pericardial rub [Edema] : there was no peripheral edema [Abdomen Soft] : soft [No CVA Tenderness] : no ~M costovertebral angle tenderness [Abnormal Walk] : normal gait [] : no rash [Oriented To Time, Place, And Person] : oriented to person, place, and time [Impaired Insight] : insight and judgment were intact [Affect] : the affect was normal [Mood] : the mood was normal

## 2019-05-16 NOTE — HISTORY OF PRESENT ILLNESS
[FreeTextEntry1] : 53-year-old female with history of HTN, proteinuria, and BIJAN (in 2005) presents to office for follow-up visit after ~2 years. Pt. was last seen in office on 5/4/2017. \par \par Kidney History: Pt. with history of kidney disease and proteinuria (since 2005) and used to follow her  nephrologist at [facility]. As per previous telephone discussion with Dr. Melissa Frazier (nephrologist at [facility]), pt. was first noted to have proteinuria in 2005 while she was admitted at [facility]. At that time, pt. had presented to [facility] with generalized body swelling and was admitted with BIJAN. Scr peaked to 3.6 during that admission and pt. was also noted to have proteinuria (unknown if nephrotic range) and microscopic hematuria. Pt. was thought to have ? glomerular process in the setting of NSAID use and streptococcal infection. Pt. however did not have a kidney biopsy performed as her Scr and proteinuria started improving with discontinuation of NSAIDs. Pt. had been following up with the nephrology clinic at [facility] since then and her renal function and proteinuria subsequently normalized. Scr and spot urine remained WNL on labs done during last clinic visit on 5/4/17.\par  \par Pt. currently feels well and offers no complaints. Scr WNL (0.78) on labs done in pulmonologist Dr. Quiros's office on 2/8/19. Pt. says she was found to have anemia on labs done in Dr. Quiros's office. Pt. denies any fever, shortness of breath, chest pain, nausea, vomiting, diarrhea, LE swelling or urinary complaints. Pt. reports compliance to her medications.

## 2019-05-16 NOTE — ASSESSMENT
[FreeTextEntry1] : 1. Proteinuria: Pt. with longstanding history of proteinuria. Pt. also with history of BIJAN in 2005. Exact etiology of proteinuria and BIJAN (in 2005) unknown. As per previous telephone discussion with patient's nephrologist (Dr. Frazier at [facility]), pt. had BIJAN (in 2005) in  the setting of NSAID use and streptococcal infection. Pt. with ? acute post streptococcal GN in 2005. Pt. with ? NSAID related BIJAN/AIN in 2005. Pt. however with normal range proteinuria on spot urine TP/CR on labs done on 5/4/17. Serological work up sent during initial office showed nonreactive Hepatitis B surface antigen and HCV antibody. There was no monoclonal gammopathy seen on serum MONO. Last Scr was WNL (0.78) on labs done on 2/8/19. Check renal panel and spot urine TP/CR today.  \par \par 2. HTN: BP in acceptable range during clinic visit today. Monitor BP on current antihypertensives. Low salt diet advised. \par \par Pt. advised to see a hematologist (at [facility] at [address] [place] [address] Office) for evaluation and management of anemia. \par \par Follow up pending review of labs results.

## 2019-05-16 NOTE — REVIEW OF SYSTEMS
[Negative] : Endocrine [As Noted in HPI] : as noted in HPI [FreeTextEntry9] : intermittent back pain

## 2019-05-17 ENCOUNTER — RESULT REVIEW (OUTPATIENT)
Age: 54
End: 2019-05-17

## 2019-05-17 LAB
ALBUMIN SERPL ELPH-MCNC: 4.7 G/DL
ANION GAP SERPL CALC-SCNC: 15 MMOL/L
BUN SERPL-MCNC: 18 MG/DL
CALCIUM SERPL-MCNC: 10.3 MG/DL
CHLORIDE SERPL-SCNC: 103 MMOL/L
CO2 SERPL-SCNC: 25 MMOL/L
CREAT SERPL-MCNC: 1.05 MG/DL
CREAT SPEC-SCNC: 153 MG/DL
CREAT/PROT UR: 0.1 RATIO
GLUCOSE SERPL-MCNC: 86 MG/DL
PHOSPHATE SERPL-MCNC: 3.1 MG/DL
POTASSIUM SERPL-SCNC: 3.8 MMOL/L
PROT UR-MCNC: 20 MG/DL
SODIUM SERPL-SCNC: 143 MMOL/L

## 2019-05-31 ENCOUNTER — APPOINTMENT (OUTPATIENT)
Dept: SURGERY | Facility: CLINIC | Age: 54
End: 2019-05-31
Payer: COMMERCIAL

## 2019-05-31 VITALS
SYSTOLIC BLOOD PRESSURE: 127 MMHG | RESPIRATION RATE: 15 BRPM | OXYGEN SATURATION: 97 % | DIASTOLIC BLOOD PRESSURE: 84 MMHG | WEIGHT: 239 LBS | HEIGHT: 69 IN | TEMPERATURE: 98.1 F | HEART RATE: 75 BPM | BODY MASS INDEX: 35.4 KG/M2

## 2019-05-31 DIAGNOSIS — Z78.9 OTHER SPECIFIED HEALTH STATUS: ICD-10-CM

## 2019-05-31 PROCEDURE — 99243 OFF/OP CNSLTJ NEW/EST LOW 30: CPT

## 2019-05-31 NOTE — ASSESSMENT
[FreeTextEntry1] : 53-year-old woman with BMI 35, meeting criteria for severe obesity, with comorbidities of obstructive sleep apnea and asthma.  Her medical issues are being directly worsened by her obesity.  She has noted increase in weight despite conservative measures of exercise and mindful eating.  She is interested in pursuing a medical weight management program and possible surgery, specifically sleeve gastrectomy, should these conservative measures fail.

## 2019-05-31 NOTE — HISTORY OF PRESENT ILLNESS
[de-identified] : Luz Maria Pat is a 53-year-old woman with a BMI of 35 who was referred by her pulmonologist, Dr. Quiros, for evaluation for bariatric surgery.\par \par Patient has been slightly overweight for several years.  In the last couple of years, she has noted more significant weight gain which has worsened her sleep apnea, and she has also been diagnosed with asthma.  The patient maintains an active lifestyle, swimming 4 days a week, and he eats a healthy, balanced diet.  She does admit to snacking, occasionally on unhealthy foods.  She occasionally eats out but feels she eats normal-sized portions.  The patient wants to make a significant change in order to improve her health in her breathing.  She is considering bariatric surgery but would also like to try conservative medical weight loss measures.  He\par \par Medical comorbidities include obstructive sleep apnea and asthma.\par \par Surgical history is significant for tubal ligation, , and laparoscopic hysterectomy.\par \par Ms. Pat works in [place] [place] as a .

## 2019-05-31 NOTE — REASON FOR VISIT
[Initial Consult] : an initial consult for [Morbid Obesity (BMI<40)] : morbid obesity (bmi<40) [FreeTextEntry2] : referred by Dr. Abelino Quiros

## 2019-05-31 NOTE — PHYSICAL EXAM
[Obese, well nourished, in no acute distress] : obese, well nourished, in no acute distress [Normal] : affect appropriate [de-identified] : breathing comfortably [de-identified] : soft, nontender, nondistended, no masses.  Well healed incisions

## 2019-05-31 NOTE — CONSULT LETTER
[Dear  ___] : Dear  [unfilled], [Consult Letter:] : I had the pleasure of evaluating your patient, [unfilled]. [Please see my note below.] : Please see my note below. [Consult Closing:] : Thank you very much for allowing me to participate in the care of this patient.  If you have any questions, please do not hesitate to contact me. [Sincerely,] : Sincerely, [FreeTextEntry2] : Dr. Roca Trust [FreeTextEntry1] : As you know, she is struggling with severe obesity with comorbidities of asthma and sleep apnea.  She is interested in an intensive weight management program and possibly laparoscopic sleeve gastrectomy. [FreeTextEntry3] : Cyrus Valiente MD, FACS\par [facility]\par [address]\[place] [address]\par tel. [phone]\par fax [phone]

## 2019-05-31 NOTE — PLAN
[FreeTextEntry1] : I have reviewed all surgical and nonsurgical options with the patient, who would like to proceed with medical weight management program and possibly sleeve gastrectomy. Based on my assessment, I feel she is a reasonable candidate for laparoscopic sleeve gastrectomy. We have reviewed all the risks and benefits, including the risk of bleeding, leak, infection, sepsis, malnutrition, reflux, vomiting, and weight regain. We have also discussed the need for  lifelong vitamin supplementation, as well as the need for strict adherence to an appropriate bariatric diet postoperatively and maintaining an active lifestyle. I reviewed the importance of behavioral modification and regular follow-up in order to optimize outcomes and avoid complications. All questions and concerns were addressed. \par The patient will undergo laparoscopic sleeve gastrectomy after completion of medical weight management program, upper endoscopy, pulmonary and cardiology clearance, psychological assessment, and medical clearance.\par Followup in one month.

## 2019-06-11 ENCOUNTER — APPOINTMENT (OUTPATIENT)
Dept: INTERNAL MEDICINE | Facility: CLINIC | Age: 54
End: 2019-06-11
Payer: COMMERCIAL

## 2019-06-11 DIAGNOSIS — Z87.19 PERSONAL HISTORY OF OTHER DISEASES OF THE DIGESTIVE SYSTEM: ICD-10-CM

## 2019-06-11 DIAGNOSIS — Z01.818 ENCOUNTER FOR OTHER PREPROCEDURAL EXAMINATION: ICD-10-CM

## 2019-06-11 DIAGNOSIS — Z87.09 PERSONAL HISTORY OF OTHER DISEASES OF THE RESPIRATORY SYSTEM: ICD-10-CM

## 2019-06-11 DIAGNOSIS — N83.202 UNSPECIFIED OVARIAN CYST, LEFT SIDE: ICD-10-CM

## 2019-06-11 DIAGNOSIS — Z87.898 PERSONAL HISTORY OF OTHER SPECIFIED CONDITIONS: ICD-10-CM

## 2019-06-11 DIAGNOSIS — Z12.11 ENCOUNTER FOR SCREENING FOR MALIGNANT NEOPLASM OF COLON: ICD-10-CM

## 2019-06-11 DIAGNOSIS — K62.89 OTHER SPECIFIED DISEASES OF ANUS AND RECTUM: ICD-10-CM

## 2019-06-11 PROCEDURE — 36415 COLL VENOUS BLD VENIPUNCTURE: CPT

## 2019-06-11 PROCEDURE — G0444 DEPRESSION SCREEN ANNUAL: CPT

## 2019-06-11 PROCEDURE — 99386 PREV VISIT NEW AGE 40-64: CPT | Mod: 25

## 2019-06-11 PROCEDURE — 99214 OFFICE O/P EST MOD 30 MIN: CPT | Mod: 25

## 2019-06-11 NOTE — REVIEW OF SYSTEMS
[Recent Change In Weight] : ~T recent weight change [Postnasal Drip] : postnasal drip [Shortness Of Breath] : shortness of breath [Wheezing] : no wheezing [Cough] : no cough [Dyspnea on Exertion] : dyspnea on exertion [Back Pain] : back pain [Mole Changes] : mole changes [Negative] : Neurological [FreeTextEntry2] : weight gain [FreeTextEntry7] : abdominal bloating

## 2019-06-11 NOTE — PHYSICAL EXAM
[No Acute Distress] : no acute distress [Well Nourished] : well nourished [Well-Appearing] : well-appearing [Well Developed] : well developed [PERRL] : pupils equal round and reactive to light [Normal Sclera/Conjunctiva] : normal sclera/conjunctiva [Normal Oropharynx] : the oropharynx was normal [Normal Outer Ear/Nose] : the outer ears and nose were normal in appearance [No Respiratory Distress] : no respiratory distress  [Clear to Auscultation] : lungs were clear to auscultation bilaterally [Supple] : supple [Regular Rhythm] : with a regular rhythm [Normal S1, S2] : normal S1 and S2 [Normal Rate] : normal rate  [Soft] : abdomen soft [No Edema] : there was no peripheral edema [Non Tender] : non-tender [Normal Bowel Sounds] : normal bowel sounds [Non-distended] : non-distended [No CVA Tenderness] : no CVA  tenderness [Grossly Normal Strength/Tone] : grossly normal strength/tone [Normal Gait] : normal gait [No Focal Deficits] : no focal deficits [Coordination Grossly Intact] : coordination grossly intact [Alert and Oriented x3] : oriented to person, place, and time [Normal Affect] : the affect was normal [Deep Tendon Reflexes (DTR)] : deep tendon reflexes were 2+ and symmetric [Normal Insight/Judgement] : insight and judgment were intact

## 2019-06-11 NOTE — PLAN
[FreeTextEntry1] : Healthcare Maintenance\par -routine labs, follow up results\par -EKG March 2019 reviewed\par -UTD with pap smear, mammo 2019\par -UTD with colonoscopy 2016\par -DEXA 2018\par -depression screening negative\par \par GOMEZ\par -continue montelukast and proair prior to exercise, symbicort\par -recommend cardiology eval for ECHO- family h/o heart disease plus HTN and GOMEZ with chest tightness\par \par Abdominal Bloating\par -check abd US\par -recommend GI follow up- EGD\par \par Low back pain\par -continue PT\par -ortho follow up with Dr Escalante\par \par Anemia\par -repeat CBC today\par -has heme appt schedule for June 24

## 2019-06-11 NOTE — HISTORY OF PRESENT ILLNESS
[FreeTextEntry1] : establish care [de-identified] : 53yo F with PMH of asthma, sleep apnea with nightly CPAP, HTN, proteinuria presents to establish care. She is followed regularly by pulmonology Dr Quiros, nephrology Dr Breen and recently seen by bariatric surgery Dr Valiente, GYN Dr Anthony.\par She complains of shortness of breath on exertion along with some chest tightness. She tries to avoid using the stairs and opts for elevators for this reason. She is an active person, swims regularly and is always moving around. It was thought that the shortness of breath is related to asthma, sleep apnea, caused by weight gain. \par She complains of low back pain, she sees ortho Dr Escalante for this. She has been dealing with this pain for a couple of years, she has tried PT, aqua therapy as well. She currently is going to cold therapy. She is active and swims four days per week. Her last appt with ortho was in December 2018 and has not followed up as of yet. She has had a medial branch block with another provider years ago at [facility]. \par She has been suffering from abdominal bloating for about 1-2 years. She does not note association with food, feels that this bloating is present all the time. Denies abd pain, N/V/D/C, blood in stool. She feels that if she lays on her stomach she feels a pressure sensation. \par She is s/p hysterectomy due to uterine fibroids causing heavy bleeding and anemia. On recent testing she was found to be anemic again and advised to see hematology; appt scheduled in 2 weeks.\par She also noted a mole on the back of her neck which she feels is growing and can be tender at times. Denies any bleeding or discharge noted from the mole.\par Recent follow up with nephrology, her BP medication was changed to ARB; repeat urine testing showed improvement and creatinine within normal limits. She was seen by bariatric surgeon for weight loss options, she has opted to try conservative measures first including nutritionist visit and diet changes for 6 months prior to considering surgical intervention.

## 2019-06-11 NOTE — HEALTH RISK ASSESSMENT
[0] : 2) Feeling down, depressed, or hopeless: Not at all (0) [Patient reported PAP Smear was normal] : Patient reported PAP Smear was normal [None] : None [With Family] : lives with family [] :  [Employed] : employed [Feels Safe at Home] : Feels safe at home [Fully functional (bathing, dressing, toileting, transferring, walking, feeding)] : Fully functional (bathing, dressing, toileting, transferring, walking, feeding) [# Of Children ___] : has [unfilled] children [Fully functional (using the telephone, shopping, preparing meals, housekeeping, doing laundry, using] : Fully functional and needs no help or supervision to perform IADLs (using the telephone, shopping, preparing meals, housekeeping, doing laundry, using transportation, managing medications and managing finances) [Smoke Detector] : smoke detector [Carbon Monoxide Detector] : carbon monoxide detector [Seat Belt] :  uses seat belt [] : No [de-identified] : socially  [EQC4Kuqut] : 0 [YearQuit] : 1997 [Change in mental status noted] : No change in mental status noted [Language] : denies difficulty with language [Reports changes in vision] : Reports no changes in vision [Reports changes in hearing] : Reports no changes in hearing [Reports changes in dental health] : Reports no changes in dental health [MammogramDate] : 10/18 [PapSmearDate] : 2018 [ColonoscopyDate] : 08/16 [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

## 2019-06-12 LAB
BASOPHILS # BLD AUTO: 0.03 K/UL
BASOPHILS NFR BLD AUTO: 0.6 %
CHOLEST SERPL-MCNC: 186 MG/DL
CHOLEST/HDLC SERPL: 2.3 RATIO
EOSINOPHIL # BLD AUTO: 0.21 K/UL
EOSINOPHIL NFR BLD AUTO: 4 %
ESTIMATED AVERAGE GLUCOSE: 100 MG/DL
HBA1C MFR BLD HPLC: 5.1 %
HCT VFR BLD CALC: 39.3 %
HDLC SERPL-MCNC: 80 MG/DL
HGB BLD-MCNC: 11.7 G/DL
IMM GRANULOCYTES NFR BLD AUTO: 0.2 %
LDLC SERPL CALC-MCNC: 87 MG/DL
LYMPHOCYTES # BLD AUTO: 2.08 K/UL
LYMPHOCYTES NFR BLD AUTO: 39.2 %
MAN DIFF?: NORMAL
MCHC RBC-ENTMCNC: 29.3 PG
MCHC RBC-ENTMCNC: 29.8 GM/DL
MCV RBC AUTO: 98.3 FL
MONOCYTES # BLD AUTO: 0.4 K/UL
MONOCYTES NFR BLD AUTO: 7.5 %
NEUTROPHILS # BLD AUTO: 2.57 K/UL
NEUTROPHILS NFR BLD AUTO: 48.5 %
PLATELET # BLD AUTO: 289 K/UL
RBC # BLD: 4 M/UL
RBC # FLD: 12.8 %
TRIGL SERPL-MCNC: 96 MG/DL
TSH SERPL-ACNC: 0.97 UIU/ML
WBC # FLD AUTO: 5.3 K/UL

## 2019-06-13 LAB — 25(OH)D3 SERPL-MCNC: 20.1 NG/ML

## 2019-06-17 ENCOUNTER — OUTPATIENT (OUTPATIENT)
Dept: OUTPATIENT SERVICES | Facility: HOSPITAL | Age: 54
LOS: 1 days | Discharge: HOME | End: 2019-06-17

## 2019-06-17 DIAGNOSIS — D64.9 ANEMIA, UNSPECIFIED: ICD-10-CM

## 2019-06-24 ENCOUNTER — APPOINTMENT (OUTPATIENT)
Dept: HEMATOLOGY ONCOLOGY | Facility: CLINIC | Age: 54
End: 2019-06-24
Payer: COMMERCIAL

## 2019-06-24 ENCOUNTER — RESULT REVIEW (OUTPATIENT)
Age: 54
End: 2019-06-24

## 2019-06-24 VITALS
HEIGHT: 68.5 IN | HEART RATE: 66 BPM | OXYGEN SATURATION: 99 % | TEMPERATURE: 97.7 F | BODY MASS INDEX: 36.13 KG/M2 | RESPIRATION RATE: 15 BRPM | DIASTOLIC BLOOD PRESSURE: 89 MMHG | WEIGHT: 241.18 LBS | SYSTOLIC BLOOD PRESSURE: 135 MMHG

## 2019-06-24 LAB
ANION GAP SERPL CALC-SCNC: 10 MMOL/L
BUN SERPL-MCNC: 12 MG/DL
CALCIUM SERPL-MCNC: 9.9 MG/DL
CHLORIDE SERPL-SCNC: 104 MMOL/L
CO2 SERPL-SCNC: 26 MMOL/L
CREAT SERPL-MCNC: 1.02 MG/DL
CRP SERPL-MCNC: 0.67 MG/DL
DEPRECATED KAPPA LC FREE/LAMBDA SER: 1.56 RATIO
ERYTHROCYTE [SEDIMENTATION RATE] IN BLOOD: 18 MM/HR — SIGNIFICANT CHANGE UP (ref 0–20)
FERRITIN SERPL-MCNC: 230 NG/ML
FOLATE SERPL-MCNC: 16.1 NG/ML
GLUCOSE SERPL-MCNC: 99 MG/DL
HCT VFR BLD CALC: 35.9 % — SIGNIFICANT CHANGE UP (ref 34.5–45)
HGB BLD-MCNC: 11.7 G/DL — SIGNIFICANT CHANGE UP (ref 11.5–15.5)
IRON SATN MFR SERPL: 42 %
IRON SERPL-MCNC: 126 UG/DL
KAPPA LC CSF-MCNC: 1.85 MG/DL
KAPPA LC SERPL-MCNC: 2.89 MG/DL
MCHC RBC-ENTMCNC: 30.2 PG — SIGNIFICANT CHANGE UP (ref 27–34)
MCHC RBC-ENTMCNC: 32.7 G/DL — SIGNIFICANT CHANGE UP (ref 32–36)
MCV RBC AUTO: 92.5 FL — SIGNIFICANT CHANGE UP (ref 80–100)
PLATELET # BLD AUTO: 257 K/UL — SIGNIFICANT CHANGE UP (ref 150–400)
POTASSIUM SERPL-SCNC: 4.4 MMOL/L
RBC # BLD: 3.87 M/UL — SIGNIFICANT CHANGE UP (ref 3.8–5.2)
RBC # FLD: 11.9 % — SIGNIFICANT CHANGE UP (ref 10.3–14.5)
SODIUM SERPL-SCNC: 140 MMOL/L
TIBC SERPL-MCNC: 301 UG/DL
UIBC SERPL-MCNC: 175 UG/DL
VIT B12 SERPL-MCNC: 429 PG/ML
WBC # BLD: 4.4 K/UL — SIGNIFICANT CHANGE UP (ref 3.8–10.5)
WBC # FLD AUTO: 4.4 K/UL — SIGNIFICANT CHANGE UP (ref 3.8–10.5)

## 2019-06-24 PROCEDURE — 99204 OFFICE O/P NEW MOD 45 MIN: CPT

## 2019-06-24 NOTE — REVIEW OF SYSTEMS
[SOB on Exertion] : shortness of breath during exertion [Joint Pain] : joint pain [Negative] : Allergic/Immunologic [Fatigue] : fatigue [FreeTextEntry9] : low back pain pain radiates to the right thigh, pain in the right elbow.

## 2019-06-24 NOTE — PHYSICAL EXAM
[Obese] : obese [Normal] : affect appropriate [Restricted in physically strenuous activity but ambulatory and able to carry out work of a light or sedentary nature] : Status 1- Restricted in physically strenuous activity but ambulatory and able to carry out work of a light or sedentary nature, e.g., light house work, office work

## 2019-06-24 NOTE — ASSESSMENT
[FreeTextEntry1] : 55yo F with PMH of asthma, morbid obesity,  sleep apnea on CPAP, HTN, proteinuria. Patient was referred for evaluation and management of anemia. \par \par Laboratory studies CBC 6/24/19 : WBC 4.4, Hb 11.7, RBC 3.87, MCV 92.5, Hct 35.9%, RDW 11.9%, PLTs 257.  \par \par Complete anemia work up was performed today, to evaluate for iron and B 12 deficiency. Patient also had an MRI on 1/2019 for back pain that showed bone marrow edema; immunoelectrophoretic studies done today to r/o a paraproteinemia. \par \par RTC 3 months.

## 2019-06-24 NOTE — CONSULT LETTER
[Dear  ___] : Dear  [unfilled], [Consult Letter:] : I had the pleasure of evaluating your patient, [unfilled]. [Please see my note below.] : Please see my note below. [Consult Closing:] : Thank you very much for allowing me to participate in the care of this patient.  If you have any questions, please do not hesitate to contact me. [Sincerely,] : Sincerely, [DrLaney  ___] : Dr. YEH [FreeTextEntry2] : Dr Francine Gracia

## 2019-06-24 NOTE — HISTORY OF PRESENT ILLNESS
[0 - No Distress] : Distress Level: 0 [de-identified] : 53yo F with PMH of asthma, morbid obesity,  sleep apnea on CPAP, HTN, proteinuria. Patient was referred for evaluation and management of anemia. \par \par Laboratory studies from November, 2018 c/w folate 13, B 12 355.\par Iron studies ferritin 225, iron 127, TIBC 294, % saturation iron 43%.\par Creatine 0.78. \par \par Patient has history of iron deficiency anemia secondary to menorrhagia; s/p hysterectomy due to uterine fibroids.\par \par Patient has been followed by Dr Bruno Breen for history of renal insufficiency in 2005,  and proteinuria which has been stable. Patient developed BIJAN and proteinuria  in the setting of NSAID use and streptococcal infection. Pt. with acute post streptococcal GN in 2005.\par \par Patient denies feeling dizzy, lightheadedness, palpitations, recently diagnosed with asthma within the last year complaints of sob with exertion. \par \par MRI of the spine performed on 1/5/19 c/w Arthrosis at L4-L5 associated bone marrow edema and perifacet soft tissue edema. \par \par

## 2019-06-26 LAB — M PROTEIN SPEC IFE-MCNC: NORMAL

## 2019-08-07 ENCOUNTER — RX RENEWAL (OUTPATIENT)
Age: 54
End: 2019-08-07

## 2019-08-08 ENCOUNTER — APPOINTMENT (OUTPATIENT)
Dept: PULMONOLOGY | Facility: CLINIC | Age: 54
End: 2019-08-08
Payer: COMMERCIAL

## 2019-08-08 VITALS
TEMPERATURE: 98.9 F | DIASTOLIC BLOOD PRESSURE: 78 MMHG | HEART RATE: 71 BPM | SYSTOLIC BLOOD PRESSURE: 131 MMHG | OXYGEN SATURATION: 100 %

## 2019-08-08 PROCEDURE — 99213 OFFICE O/P EST LOW 20 MIN: CPT | Mod: 25

## 2019-08-08 PROCEDURE — 94060 EVALUATION OF WHEEZING: CPT

## 2019-08-08 NOTE — DISCUSSION/SUMMARY
[FreeTextEntry1] : \par Mild asthma need daily compliance with use Symbicort and rinse\par    Hypertension borderline\par Proteinuria with history mild renal insufficiency follow-up PMD\par Comprehensive medical exam and hematology evaluation for anemia noted\par Benicar 40-12.5 mg po QD\par continue singulair 10 mg QD\par Symbicort  and prn Proair as noted\par LEONARD- AUTO CPAP  6 - 18 cm H20\par Patient compliant with CPAP therapy.  Continue present settings.  Patient with demonstrated clinical benefit with daytime and nocturnal symptomatology improvement.\par But needs to improve usage \par Back pain -management Ortho Dr Escalante\par \par  Vit D hx insuff and last Vit D 29.1\par  Proair before swimming 2 puffs\par  singulair 10  QD with food\par Hyzaar and changed Benicar with HCTZ\par Anemia w/u and noted stool negative heme renal\par \par Recommendation patient to consider bariatric surgery\par Do believe her weight contributes to her sensation of shortness of breath\par Referral Cyrus Valiente MD-consult completed and reviewed\par \par Mammogram  prior management with GYN Dr Sheets and due Mammogram Sept 2019\par  pt set reminder- Completed and per patient negative\par \par f/u RENAl for proteinuria\par \par Patient compliant with CPAP therapy.  Continue present settings.  Patient with demonstrated clinical benefit with daytime and nocturnal symptomatology improvement.\par \par Data review February 9, 2019\par CBC White blood count 5.13 hemoglobin 10.7 hematocrit 36.1 platelet count 276,000\par Serum electrolytes liver function tests normal

## 2019-08-08 NOTE — REVIEW OF SYSTEMS
[Sinus Problems] : sinus problems [As Noted in HPI] : as noted in HPI [Hypertension] : ~T hypertension [Back Pain] : ~T back pain [Anemia] : anemia [Negative] : Endocrine [Ear Disturbance] : no ear disturbance [Nasal Congestion] : no nasal congestion [Epistaxis] : no nosebleeds [Postnasal Drip] : no postnasal drip [Sore Throat] : no sore throat [Dry Mouth] : no dry mouth [PND] : no PND [Orthopnea] : no orthopnea [Palpitations] : no palpitations [Edema] : ~T edema was not present [Leg Cramps] : no leg cramps [Claudication] : no intermittent claudication [FreeTextEntry5] : colonoscopy 2016 [FreeTextEntry6] : management Dr Abraham Escalante

## 2019-08-08 NOTE — PROCEDURE
[FreeTextEntry1] : Spirometry August 8, 2019\par Normal flow rates\par Minimal obstructive ventilatory impairment with flow volume loop demonstrating a component of obstructive impairment\par No response to bronchodilator at FEV1\par Stable pulmonary physiology\par \par Chest x-ray PA lateral projection March 7, 2019\par Cardiac size grossly normal\par Some atelectatic change right mid lung zone\par Cannot exclude nodularity right lateral lung zone\par No pleural effusions no thorax\par Chest x-ray of August 21, 2018 no interval change\par \par \par PFT5/9/2019\par Stable flow rates\par  Minimal OAD \par No BD at FEV1\par Normal lung volumes\par Normal diffusion\par Hemoglobin 10.6\par \par  CPAP DATA 8/8/19\par Usage 43 %\par Hours on average 4\par Order CPAP setting 6-18 cm H2O\par Average AHI 1.4

## 2019-08-08 NOTE — PHYSICAL EXAM
[General Appearance - Well Developed] : well developed [Normal Appearance] : normal appearance [Well Groomed] : well groomed [General Appearance - Well Nourished] : well nourished [No Deformities] : no deformities [Normal Conjunctiva] : the conjunctiva exhibited no abnormalities [General Appearance - In No Acute Distress] : no acute distress [Eyelids - No Xanthelasma] : the eyelids demonstrated no xanthelasmas [II] : II [Normal Oropharynx] : normal oropharynx [Neck Appearance] : the appearance of the neck was normal [Jugular Venous Distention Increased] : there was no jugular-venous distention [Neck Cervical Mass (___cm)] : no neck mass was observed [Thyroid Diffuse Enlargement] : the thyroid was not enlarged [Heart Sounds] : normal S1 and S2 [Heart Rate And Rhythm] : heart rate and rhythm were normal [Murmurs] : no murmurs present [Arterial Pulses Normal] : the arterial pulses were normal [Veins - Varicosity Changes] : no varicosital changes were noted in the lower extremities [Edema] : no peripheral edema present [Respiration, Rhythm And Depth] : normal respiratory rhythm and effort [Exaggerated Use Of Accessory Muscles For Inspiration] : no accessory muscle use [Auscultation Breath Sounds / Voice Sounds] : lungs were clear to auscultation bilaterally [Chest Palpation] : palpation of the chest revealed no abnormalities [Lungs Percussion] : the lungs were normal to percussion [Abdomen Soft] : soft [Abdomen Tenderness] : non-tender [Abdomen Mass (___ Cm)] : no abdominal mass palpated [Abnormal Walk] : normal gait [Gait - Sufficient For Exercise Testing] : the gait was sufficient for exercise testing [Nail Clubbing] : no clubbing of the fingernails [Cyanosis, Localized] : no localized cyanosis [Petechial Hemorrhages (___cm)] : no petechial hemorrhages [] : no rash [Skin Color & Pigmentation] : normal skin color and pigmentation [No Venous Stasis] : no venous stasis [Skin Lesions] : no skin lesions [No Skin Ulcers] : no skin ulcer [Deep Tendon Reflexes (DTR)] : deep tendon reflexes were 2+ and symmetric [No Xanthoma] : no  xanthoma was observed [No Focal Deficits] : no focal deficits [Sensation] : the sensory exam was normal to light touch and pinprick [Oriented To Time, Place, And Person] : oriented to person, place, and time [Impaired Insight] : insight and judgment were intact [Affect] : the affect was normal [FreeTextEntry1] : non icteric

## 2019-08-08 NOTE — HISTORY OF PRESENT ILLNESS
[Stable] : are stable [Difficulty Breathing During Exertion] : stable dyspnea on exertion [None] : ~He/She~ has no significant interval events [Feelings Of Weakness On Exertion] : stable exercise intolerance [Cough] : denies coughing [Regional Soft Tissue Swelling Both Lower Extremities] : denies lower extremity edema [Wheezing] : denies wheezing [Chest Pain Or Discomfort] : denies chest pain [Fever] : denies fever [Obstructive Sleep Apnea] : obstructive sleep apnea [Date: ___] : Date of most recent diagnostic polysomnogram: [unfilled] [AHI: ___ per hour] : Apnea-hypopnea index:  [unfilled] per hour [Wt Gain ___ Lbs] : no recent weight gain [Wt Loss ___ Lbs] : no recent weight loss [Oxygen] : the patient uses no supplemental oxygen [Nocturnal Oxygen] : The patient does not use nocturnal oxygen [FreeTextEntry1] : Reviewed prior blood work\par Last CBC June 4, 2018\par WBC 6.12 hemoglobin 11.4 hematocrit 36.4 platelet 284,000\par Thyroid function testing was normal\par Vitamin D 18.7 and noted interval improvement to 29.1\par TFT profile normal\par Cholesterol 173\par Electrolytes normal creatinine 0.95 liver function tests normal\par

## 2019-09-11 ENCOUNTER — RX RENEWAL (OUTPATIENT)
Age: 54
End: 2019-09-11

## 2019-10-11 ENCOUNTER — RX RENEWAL (OUTPATIENT)
Age: 54
End: 2019-10-11

## 2019-10-14 ENCOUNTER — APPOINTMENT (OUTPATIENT)
Dept: CARDIOLOGY | Facility: CLINIC | Age: 54
End: 2019-10-14
Payer: COMMERCIAL

## 2019-10-14 ENCOUNTER — NON-APPOINTMENT (OUTPATIENT)
Age: 54
End: 2019-10-14

## 2019-10-14 VITALS
OXYGEN SATURATION: 98 % | SYSTOLIC BLOOD PRESSURE: 118 MMHG | BODY MASS INDEX: 36.53 KG/M2 | HEART RATE: 68 BPM | HEIGHT: 68 IN | WEIGHT: 241 LBS | DIASTOLIC BLOOD PRESSURE: 80 MMHG | TEMPERATURE: 98.6 F | RESPIRATION RATE: 16 BRPM

## 2019-10-14 DIAGNOSIS — Z82.49 FAMILY HISTORY OF ISCHEMIC HEART DISEASE AND OTHER DISEASES OF THE CIRCULATORY SYSTEM: ICD-10-CM

## 2019-10-14 PROCEDURE — 93306 TTE W/DOPPLER COMPLETE: CPT

## 2019-10-14 PROCEDURE — 93015 CV STRESS TEST SUPVJ I&R: CPT

## 2019-10-14 PROCEDURE — 99204 OFFICE O/P NEW MOD 45 MIN: CPT | Mod: 25

## 2019-10-14 PROCEDURE — 93000 ELECTROCARDIOGRAM COMPLETE: CPT | Mod: 59

## 2019-10-17 ENCOUNTER — APPOINTMENT (OUTPATIENT)
Dept: CARDIOLOGY | Facility: CLINIC | Age: 54
End: 2019-10-17
Payer: COMMERCIAL

## 2019-10-17 PROCEDURE — A9500: CPT

## 2019-10-17 PROCEDURE — 93015 CV STRESS TEST SUPVJ I&R: CPT

## 2019-10-17 PROCEDURE — 78452 HT MUSCLE IMAGE SPECT MULT: CPT

## 2019-10-20 PROBLEM — Z82.49 FAMILY HISTORY OF CARDIOVASCULAR DISORDER: Status: ACTIVE | Noted: 2019-10-20

## 2019-10-20 NOTE — HISTORY OF PRESENT ILLNESS
[FreeTextEntry1] : Luz Maria is a 53yo female referred by her PCP Dr. Kaye for cardiac evaluation. Patient has been experiencing chest tightness, shortness of breath and dizziness with exertion such as climbing stairs, swimming or exercising for the past 6 months-1 year. Patient reports this occurs at least 4-5 days a week. Patient states that when she rests the symptoms resolve. Patient denies palpitations. \par The patient here for evaluation of high blood pressure. Patient is currently tolerating the current antihypertensive regime and they deny headaches, stiff neck, visual changes, or PND. The patient has been trying to stay on a low-sodium diet.\par

## 2019-10-20 NOTE — PHYSICAL EXAM
[General Appearance - Well Developed] : well developed [Normal Appearance] : normal appearance [Well Groomed] : well groomed [General Appearance - Well Nourished] : well nourished [No Deformities] : no deformities [General Appearance - In No Acute Distress] : no acute distress [Normal Conjunctiva] : the conjunctiva exhibited no abnormalities [Eyelids - No Xanthelasma] : the eyelids demonstrated no xanthelasmas [Normal Oral Mucosa] : normal oral mucosa [No Oral Pallor] : no oral pallor [No Oral Cyanosis] : no oral cyanosis [Normal Jugular Venous A Waves Present] : normal jugular venous A waves present [Normal Jugular Venous V Waves Present] : normal jugular venous V waves present [No Jugular Venous Vazquez A Waves] : no jugular venous vazquez A waves [Heart Rate And Rhythm] : heart rate and rhythm were normal [Heart Sounds] : normal S1 and S2 [Murmurs] : no murmurs present [Respiration, Rhythm And Depth] : normal respiratory rhythm and effort [Auscultation Breath Sounds / Voice Sounds] : lungs were clear to auscultation bilaterally [Exaggerated Use Of Accessory Muscles For Inspiration] : no accessory muscle use [Abnormal Walk] : normal gait [Gait - Sufficient For Exercise Testing] : the gait was sufficient for exercise testing [Nail Clubbing] : no clubbing of the fingernails [Cyanosis, Localized] : no localized cyanosis [Petechial Hemorrhages (___cm)] : no petechial hemorrhages [Skin Color & Pigmentation] : normal skin color and pigmentation [Skin Lesions] : no skin lesions [No Venous Stasis] : no venous stasis [No Skin Ulcers] : no skin ulcer [Oriented To Time, Place, And Person] : oriented to person, place, and time [No Xanthoma] : no  xanthoma was observed [Affect] : the affect was normal [Mood] : the mood was normal [No Anxiety] : not feeling anxious [Abdomen Soft] : soft [Abdomen Tenderness] : non-tender [] : no hepato-splenomegaly [Abdomen Mass (___ Cm)] : no abdominal mass palpated [FreeTextEntry1] : Regular rate and rhythm, NL S1, S2, non-displaced PMI, chest non-tender; no rubs,heaves  or gallops a  Grade 2/6 systolic murmur noted at the LSB

## 2019-10-23 ENCOUNTER — FORM ENCOUNTER (OUTPATIENT)
Age: 54
End: 2019-10-23

## 2019-10-24 ENCOUNTER — APPOINTMENT (OUTPATIENT)
Dept: CT IMAGING | Facility: CLINIC | Age: 54
End: 2019-10-24
Payer: SELF-PAY

## 2019-10-24 ENCOUNTER — OUTPATIENT (OUTPATIENT)
Dept: OUTPATIENT SERVICES | Facility: HOSPITAL | Age: 54
LOS: 1 days | End: 2019-10-24
Payer: SELF-PAY

## 2019-10-24 DIAGNOSIS — Z00.8 ENCOUNTER FOR OTHER GENERAL EXAMINATION: ICD-10-CM

## 2019-10-24 PROCEDURE — 75571 CT HRT W/O DYE W/CA TEST: CPT | Mod: 26

## 2019-10-24 PROCEDURE — 75571 CT HRT W/O DYE W/CA TEST: CPT

## 2019-10-31 ENCOUNTER — RESULT REVIEW (OUTPATIENT)
Age: 54
End: 2019-10-31

## 2019-11-11 ENCOUNTER — RX RENEWAL (OUTPATIENT)
Age: 54
End: 2019-11-11

## 2019-11-14 ENCOUNTER — APPOINTMENT (OUTPATIENT)
Dept: PULMONOLOGY | Facility: CLINIC | Age: 54
End: 2019-11-14
Payer: COMMERCIAL

## 2019-11-14 VITALS
HEART RATE: 78 BPM | SYSTOLIC BLOOD PRESSURE: 119 MMHG | DIASTOLIC BLOOD PRESSURE: 83 MMHG | RESPIRATION RATE: 16 BRPM | OXYGEN SATURATION: 98 % | TEMPERATURE: 97.8 F

## 2019-11-14 PROCEDURE — G0008: CPT

## 2019-11-14 PROCEDURE — 94060 EVALUATION OF WHEEZING: CPT

## 2019-11-14 PROCEDURE — 99214 OFFICE O/P EST MOD 30 MIN: CPT | Mod: 25

## 2019-11-14 PROCEDURE — 90674 CCIIV4 VAC NO PRSV 0.5 ML IM: CPT

## 2019-11-14 NOTE — REVIEW OF SYSTEMS
[Sinus Problems] : sinus problems [As Noted in HPI] : as noted in HPI [Hypertension] : ~T hypertension [Back Pain] : ~T back pain [Anemia] : anemia [Negative] : Endocrine [Epistaxis] : no nosebleeds [Ear Disturbance] : no ear disturbance [Nasal Congestion] : no nasal congestion [Sore Throat] : no sore throat [Postnasal Drip] : no postnasal drip [Dry Mouth] : no dry mouth [PND] : no PND [Orthopnea] : no orthopnea [Palpitations] : no palpitations [Edema] : ~T edema was not present [Claudication] : no intermittent claudication [Leg Cramps] : no leg cramps [FreeTextEntry6] : management Dr Abraham Escalante [FreeTextEntry5] : colonoscopy 2016

## 2019-11-14 NOTE — PROCEDURE
[FreeTextEntry1] : Spirometry 11/14/2019\par Normal flow rates\par Minimal obstructive ventilatory impairment with flow volume loop demonstrating a component of obstructive impairment\par No response to bronchodilator at FEV1\par Stable pulmonary physiology\par \par Chest x-ray PA lateral projection March 7, 2019\par Cardiac size grossly normal\par Some atelectatic change right mid lung zone\par Cannot exclude nodularity right lateral lung zone\par No pleural effusions no thorax\par Chest x-ray of August 21, 2018 no interval change\par \par \par PFT 5/9/2019\par Stable flow rates\par  Minimal OAD \par No BD at FEV1\par Normal lung volumes\par Normal diffusion\par Hemoglobin 10.6\par \par  CPAP DATA mariely thru 10/24/2019\par Usage 33 %\par Hours on average 4\par Order CPAP setting 6-18 cm H2O\par Average AHI 2.6\par \par Flu Vaccine  11/14/2019

## 2019-11-14 NOTE — HISTORY OF PRESENT ILLNESS
[Stable] : are stable [None] : ~He/She~ has no significant interval events [Difficulty Breathing During Exertion] : stable dyspnea on exertion [Feelings Of Weakness On Exertion] : stable exercise intolerance [Cough] : denies coughing [Wheezing] : denies wheezing [Regional Soft Tissue Swelling Both Lower Extremities] : denies lower extremity edema [Chest Pain Or Discomfort] : denies chest pain [Fever] : denies fever [Obstructive Sleep Apnea] : obstructive sleep apnea [Date: ___] : Date of most recent diagnostic polysomnogram: [unfilled] [AHI: ___ per hour] : Apnea-hypopnea index:  [unfilled] per hour [Wt Gain ___ Lbs] : no recent weight gain [Wt Loss ___ Lbs] : no recent weight loss [Oxygen] : the patient uses no supplemental oxygen [Nocturnal Oxygen] : The patient does not use nocturnal oxygen [FreeTextEntry1] : \par

## 2019-11-14 NOTE — PHYSICAL EXAM
[General Appearance - Well Developed] : well developed [Normal Appearance] : normal appearance [Well Groomed] : well groomed [General Appearance - Well Nourished] : well nourished [General Appearance - In No Acute Distress] : no acute distress [No Deformities] : no deformities [Normal Conjunctiva] : the conjunctiva exhibited no abnormalities [Eyelids - No Xanthelasma] : the eyelids demonstrated no xanthelasmas [Normal Oropharynx] : normal oropharynx [Neck Appearance] : the appearance of the neck was normal [II] : II [Jugular Venous Distention Increased] : there was no jugular-venous distention [Neck Cervical Mass (___cm)] : no neck mass was observed [Heart Rate And Rhythm] : heart rate and rhythm were normal [Thyroid Diffuse Enlargement] : the thyroid was not enlarged [Heart Sounds] : normal S1 and S2 [Murmurs] : no murmurs present [Arterial Pulses Normal] : the arterial pulses were normal [Veins - Varicosity Changes] : no varicosital changes were noted in the lower extremities [Edema] : no peripheral edema present [Respiration, Rhythm And Depth] : normal respiratory rhythm and effort [Auscultation Breath Sounds / Voice Sounds] : lungs were clear to auscultation bilaterally [Exaggerated Use Of Accessory Muscles For Inspiration] : no accessory muscle use [Chest Palpation] : palpation of the chest revealed no abnormalities [Lungs Percussion] : the lungs were normal to percussion [Abdomen Tenderness] : non-tender [Abdomen Soft] : soft [Abdomen Mass (___ Cm)] : no abdominal mass palpated [Abnormal Walk] : normal gait [Gait - Sufficient For Exercise Testing] : the gait was sufficient for exercise testing [Nail Clubbing] : no clubbing of the fingernails [Petechial Hemorrhages (___cm)] : no petechial hemorrhages [Cyanosis, Localized] : no localized cyanosis [Skin Color & Pigmentation] : normal skin color and pigmentation [] : no rash [No Venous Stasis] : no venous stasis [Skin Lesions] : no skin lesions [No Skin Ulcers] : no skin ulcer [No Xanthoma] : no  xanthoma was observed [Sensation] : the sensory exam was normal to light touch and pinprick [Deep Tendon Reflexes (DTR)] : deep tendon reflexes were 2+ and symmetric [No Focal Deficits] : no focal deficits [Oriented To Time, Place, And Person] : oriented to person, place, and time [Affect] : the affect was normal [Impaired Insight] : insight and judgment were intact [FreeTextEntry1] : non icteric

## 2019-11-14 NOTE — DISCUSSION/SUMMARY
[FreeTextEntry1] : LEONARD with poor CPAP usage- addressed risks /benefits\par Mild asthma need daily compliance with use Symbicort and rinse\par    Hypertension borderline\par Proteinuria with history mild renal insufficiency\par Heme  noted\par continue singulair 10 mg QD\par Symbicort  and prn Proair as noted\par LEONARD- AUTO CPAP  6 - 18 cm H20\par  Proair before swimming 2 puffs\par  singulair 10  QD with food\par Anemia w/u and noted stool negative heme renal\par \par Recommendation patient to consider bariatric surgery\par Do believe her weight contributes to her sensation of shortness of breath\par Referral yCrus Valiente MD\par \par f/u mammogram \par \par f/u RENAl for proteinuria\par \par Patient compliant with CPAP therapy.  Continue present settings.  Patient with demonstrated clinical benefit with daytime and nocturnal symptomatology improvement.\par \par Note has PCP for  overall  management and  will  defer other  medical  issues  to  her  care

## 2019-12-08 ENCOUNTER — OUTPATIENT (OUTPATIENT)
Dept: OUTPATIENT SERVICES | Facility: HOSPITAL | Age: 54
LOS: 1 days | Discharge: HOME | End: 2019-12-08

## 2019-12-08 DIAGNOSIS — D64.9 ANEMIA, UNSPECIFIED: ICD-10-CM

## 2019-12-27 ENCOUNTER — RX RENEWAL (OUTPATIENT)
Age: 54
End: 2019-12-27

## 2020-01-13 ENCOUNTER — OUTPATIENT (OUTPATIENT)
Dept: OUTPATIENT SERVICES | Facility: HOSPITAL | Age: 55
LOS: 1 days | Discharge: HOME | End: 2020-01-13

## 2020-01-13 DIAGNOSIS — D64.9 ANEMIA, UNSPECIFIED: ICD-10-CM

## 2020-01-17 ENCOUNTER — LABORATORY RESULT (OUTPATIENT)
Age: 55
End: 2020-01-17

## 2020-01-17 ENCOUNTER — RESULT REVIEW (OUTPATIENT)
Age: 55
End: 2020-01-17

## 2020-01-17 ENCOUNTER — APPOINTMENT (OUTPATIENT)
Dept: HEMATOLOGY ONCOLOGY | Facility: CLINIC | Age: 55
End: 2020-01-17
Payer: COMMERCIAL

## 2020-01-17 VITALS
RESPIRATION RATE: 18 BRPM | BODY MASS INDEX: 36.24 KG/M2 | WEIGHT: 238.32 LBS | OXYGEN SATURATION: 99 % | TEMPERATURE: 97.5 F | HEART RATE: 83 BPM | DIASTOLIC BLOOD PRESSURE: 82 MMHG | SYSTOLIC BLOOD PRESSURE: 133 MMHG

## 2020-01-17 LAB
B2 MICROGLOB SERPL-MCNC: 1.8 MG/L
BASOPHILS # BLD AUTO: 0 K/UL — SIGNIFICANT CHANGE UP (ref 0–0.2)
BASOPHILS NFR BLD AUTO: 0.8 % — SIGNIFICANT CHANGE UP (ref 0–2)
EOSINOPHIL # BLD AUTO: 0.3 K/UL — SIGNIFICANT CHANGE UP (ref 0–0.5)
EOSINOPHIL NFR BLD AUTO: 4.8 % — SIGNIFICANT CHANGE UP (ref 0–6)
HCT VFR BLD CALC: 36.2 % — SIGNIFICANT CHANGE UP (ref 34.5–45)
HGB BLD-MCNC: 11.7 G/DL — SIGNIFICANT CHANGE UP (ref 11.5–15.5)
LYMPHOCYTES # BLD AUTO: 2 K/UL — SIGNIFICANT CHANGE UP (ref 1–3.3)
LYMPHOCYTES # BLD AUTO: 35.9 % — SIGNIFICANT CHANGE UP (ref 13–44)
MCHC RBC-ENTMCNC: 29.6 PG — SIGNIFICANT CHANGE UP (ref 27–34)
MCHC RBC-ENTMCNC: 32.3 G/DL — SIGNIFICANT CHANGE UP (ref 32–36)
MCV RBC AUTO: 91.8 FL — SIGNIFICANT CHANGE UP (ref 80–100)
MONOCYTES # BLD AUTO: 0.5 K/UL — SIGNIFICANT CHANGE UP (ref 0–0.9)
MONOCYTES NFR BLD AUTO: 8.8 % — SIGNIFICANT CHANGE UP (ref 2–14)
NEUTROPHILS # BLD AUTO: 2.8 K/UL — SIGNIFICANT CHANGE UP (ref 1.8–7.4)
NEUTROPHILS NFR BLD AUTO: 49.7 % — SIGNIFICANT CHANGE UP (ref 43–77)
PLATELET # BLD AUTO: 266 K/UL — SIGNIFICANT CHANGE UP (ref 150–400)
RBC # BLD: 3.94 M/UL — SIGNIFICANT CHANGE UP (ref 3.8–5.2)
RBC # FLD: 11.3 % — SIGNIFICANT CHANGE UP (ref 10.3–14.5)
WBC # BLD: 5.6 K/UL — SIGNIFICANT CHANGE UP (ref 3.8–10.5)
WBC # FLD AUTO: 5.6 K/UL — SIGNIFICANT CHANGE UP (ref 3.8–10.5)

## 2020-01-17 PROCEDURE — 99213 OFFICE O/P EST LOW 20 MIN: CPT

## 2020-01-17 NOTE — REVIEW OF SYSTEMS
[Fatigue] : fatigue [Joint Pain] : joint pain [Negative] : Respiratory [FreeTextEntry9] : low back pain pain radiates to the right thigh, pain in the right elbow.  [SOB on Exertion] : no shortness of breath during exertion

## 2020-01-17 NOTE — ASSESSMENT
[FreeTextEntry1] : 53yo F with PMH of asthma, morbid obesity,  sleep apnea on CPAP, HTN, proteinuria. Patient was referred for evaluation and management of anemia. \par \par Laboratory studies CBC 6/24/19 : WBC 4.4, Hb 11.7, RBC 3.87, MCV 92.5, Hct 35.9%, RDW 11.9%, PLTs 257.  \par \par 6/24/19 iron studies were normal\par Ferritin 230\par Vitamin B 12 429\par Folate 16\par Light chain kappa 2.89 mildly elevated but kappa/ lambda ratio were normal. \par Immunofixation was negative for monoclonal band. \par \par RTC prn.

## 2020-01-17 NOTE — HISTORY OF PRESENT ILLNESS
[0 - No Distress] : Distress Level: 0 [de-identified] : 55yo F with PMH of asthma, morbid obesity,  sleep apnea on CPAP, HTN, proteinuria. Patient was referred for evaluation and management of anemia. \par \par Laboratory studies from November, 2018 c/w folate 13, B 12 355.\par Iron studies ferritin 225, iron 127, TIBC 294, % saturation iron 43%.\par Creatine 0.78. \par \par Patient has history of iron deficiency anemia secondary to menorrhagia; s/p hysterectomy due to uterine fibroids.\par \par Patient has been followed by Dr Bruno Breen for history of renal insufficiency in 2005,  and proteinuria which has been stable. Patient developed BIJAN and proteinuria  in the setting of NSAID use and streptococcal infection. Pt. with acute post streptococcal GN in 2005.\par \par Patient denies feeling dizzy, lightheadedness, palpitations, recently diagnosed with asthma within the last year complaints of sob with exertion. 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 \par \par  [de-identified] : 6/24/19 iron studies were normal\par Ferritin 230\par Vitamin B 12 429\par Folate 16\par Light chain kappa 2.89 mildly elevated but kappa/ lambda ratio were normal. \par Immunofixation was negative for monoclonal band. \par \par No other changes in medical, surgical or social history since 6/24/19. \par \par \par \par \par

## 2020-01-17 NOTE — CONSULT LETTER
[Dear  ___] : Dear  [unfilled], [Consult Letter:] : I had the pleasure of evaluating your patient, [unfilled]. [Consult Closing:] : Thank you very much for allowing me to participate in the care of this patient.  If you have any questions, please do not hesitate to contact me. [Please see my note below.] : Please see my note below. [Sincerely,] : Sincerely, [DrLaney  ___] : Dr. YEH [FreeTextEntry2] : Dr Francine Gracia

## 2020-01-21 ENCOUNTER — APPOINTMENT (OUTPATIENT)
Dept: MAMMOGRAPHY | Facility: IMAGING CENTER | Age: 55
End: 2020-01-21
Payer: COMMERCIAL

## 2020-01-21 ENCOUNTER — APPOINTMENT (OUTPATIENT)
Dept: RADIOLOGY | Facility: IMAGING CENTER | Age: 55
End: 2020-01-21
Payer: COMMERCIAL

## 2020-01-21 ENCOUNTER — APPOINTMENT (OUTPATIENT)
Dept: ULTRASOUND IMAGING | Facility: IMAGING CENTER | Age: 55
End: 2020-01-21
Payer: COMMERCIAL

## 2020-01-21 ENCOUNTER — OUTPATIENT (OUTPATIENT)
Dept: OUTPATIENT SERVICES | Facility: HOSPITAL | Age: 55
LOS: 1 days | End: 2020-01-21
Payer: COMMERCIAL

## 2020-01-21 DIAGNOSIS — Z00.00 ENCOUNTER FOR GENERAL ADULT MEDICAL EXAMINATION WITHOUT ABNORMAL FINDINGS: ICD-10-CM

## 2020-01-21 LAB
ALBUMIN MFR SERPL ELPH: 55.8 %
ALBUMIN SERPL-MCNC: 4.1 G/DL
ALBUMIN/GLOB SERPL: 1.2 RATIO
ALPHA1 GLOB MFR SERPL ELPH: 3.6 %
ALPHA1 GLOB SERPL ELPH-MCNC: 0.3 G/DL
ALPHA2 GLOB MFR SERPL ELPH: 8.3 %
ALPHA2 GLOB SERPL ELPH-MCNC: 0.6 G/DL
B-GLOBULIN MFR SERPL ELPH: 13 %
B-GLOBULIN SERPL ELPH-MCNC: 1 G/DL
DEPRECATED KAPPA LC FREE/LAMBDA SER: 1.37 RATIO
DEPRECATED KAPPA LC FREE/LAMBDA SER: 1.37 RATIO
GAMMA GLOB FLD ELPH-MCNC: 1.4 G/DL
GAMMA GLOB MFR SERPL ELPH: 19.3 %
IGA SER QL IEP: 336 MG/DL
IGG SER QL IEP: 1299 MG/DL
IGM SER QL IEP: 262 MG/DL
INTERPRETATION SERPL IEP-IMP: NORMAL
KAPPA LC CSF-MCNC: 1.68 MG/DL
KAPPA LC CSF-MCNC: 1.68 MG/DL
KAPPA LC SERPL-MCNC: 2.3 MG/DL
KAPPA LC SERPL-MCNC: 2.3 MG/DL
M PROTEIN SPEC IFE-MCNC: NORMAL
PROT SERPL-MCNC: 7.4 G/DL
PROT SERPL-MCNC: 7.4 G/DL

## 2020-01-21 PROCEDURE — 77063 BREAST TOMOSYNTHESIS BI: CPT

## 2020-01-21 PROCEDURE — 77063 BREAST TOMOSYNTHESIS BI: CPT | Mod: 26

## 2020-01-21 PROCEDURE — 77080 DXA BONE DENSITY AXIAL: CPT

## 2020-01-21 PROCEDURE — 76641 ULTRASOUND BREAST COMPLETE: CPT

## 2020-01-21 PROCEDURE — 76641 ULTRASOUND BREAST COMPLETE: CPT | Mod: 26,50

## 2020-01-21 PROCEDURE — 77080 DXA BONE DENSITY AXIAL: CPT | Mod: 26

## 2020-01-21 PROCEDURE — 77067 SCR MAMMO BI INCL CAD: CPT | Mod: 26

## 2020-01-21 PROCEDURE — 77067 SCR MAMMO BI INCL CAD: CPT

## 2020-01-22 LAB
ALBUPE: 31.3 %
ALPHA1UPE: 27.5 %
ALPHA2UPE: 18.4 %
BETAUPE: 10.3 %
CREAT 24H UR-MCNC: NORMAL G/24 H
CREATININE UR (MAYO): 273 MG/DL
GAMMAUPE: 12.5 %
IGA 24H UR QL IFE: NORMAL
KAPPA LC 24H UR QL: NORMAL
PROT PATTERN 24H UR ELPH-IMP: NORMAL
PROT UR-MCNC: 20 MG/DL
PROT UR-MCNC: 20 MG/DL
SPECIMEN VOL 24H UR: NORMAL ML

## 2020-01-23 ENCOUNTER — APPOINTMENT (OUTPATIENT)
Dept: INTERNAL MEDICINE | Facility: CLINIC | Age: 55
End: 2020-01-23
Payer: COMMERCIAL

## 2020-01-23 VITALS
OXYGEN SATURATION: 98 % | HEART RATE: 76 BPM | DIASTOLIC BLOOD PRESSURE: 84 MMHG | SYSTOLIC BLOOD PRESSURE: 132 MMHG | BODY MASS INDEX: 36.07 KG/M2 | WEIGHT: 238 LBS | HEIGHT: 68 IN

## 2020-01-23 DIAGNOSIS — R63.5 ABNORMAL WEIGHT GAIN: ICD-10-CM

## 2020-01-23 DIAGNOSIS — D22.9 MELANOCYTIC NEVI, UNSPECIFIED: ICD-10-CM

## 2020-01-23 PROCEDURE — 99214 OFFICE O/P EST MOD 30 MIN: CPT

## 2020-01-23 RX ORDER — PREDNISONE 20 MG/1
20 TABLET ORAL DAILY
Qty: 5 | Refills: 0 | Status: DISCONTINUED | COMMUNITY
Start: 2019-10-14 | End: 2020-01-23

## 2020-01-23 NOTE — PHYSICAL EXAM
[Well Nourished] : well nourished [No Acute Distress] : no acute distress [Well Developed] : well developed [No Respiratory Distress] : no respiratory distress  [No Accessory Muscle Use] : no accessory muscle use [Clear to Auscultation] : lungs were clear to auscultation bilaterally [Normal S1, S2] : normal S1 and S2 [Regular Rhythm] : with a regular rhythm [Normal Rate] : normal rate  [de-identified] : hyperpigmented skin mole right neck with central crusting, smaller surrounding moles/tags also noted [No Focal Deficits] : no focal deficits [Alert and Oriented x3] : oriented to person, place, and time

## 2020-01-23 NOTE — HISTORY OF PRESENT ILLNESS
[FreeTextEntry1] : follow up [de-identified] : Patient present for skin lesion on rigth side of her neck. She has had that mole for some time but recently it has grown and also notes bleeding and pain. Notes additional skin lesions around that area which are new. She has not seen dermatologist as of yet.\par She has followed up with hematologist for anemia, recently had extensive labs done and will follow up for results. She was also recently seen by cardiologist, had abnormal initial stress test but then nuclear stress test without acute findings. She sees pulmonologist regularly.\par

## 2020-01-23 NOTE — PLAN
[FreeTextEntry1] : Skin lesion\par -dermatology consult recommended- referral placed\par \par Weight gain\par -feels she has gained significant weight over the past two years\par -seen by weight loss doctor\par -regular exercise noted\par -nutritionist information provided\par \par Had mammogram and DEXA last week with GYN- to have results sent to office for review\par

## 2020-01-31 ENCOUNTER — APPOINTMENT (OUTPATIENT)
Dept: GASTROENTEROLOGY | Facility: CLINIC | Age: 55
End: 2020-01-31
Payer: COMMERCIAL

## 2020-01-31 VITALS
BODY MASS INDEX: 34.96 KG/M2 | HEART RATE: 75 BPM | WEIGHT: 236 LBS | OXYGEN SATURATION: 98 % | SYSTOLIC BLOOD PRESSURE: 120 MMHG | DIASTOLIC BLOOD PRESSURE: 80 MMHG | HEIGHT: 69 IN | TEMPERATURE: 98.5 F

## 2020-01-31 PROCEDURE — 99213 OFFICE O/P EST LOW 20 MIN: CPT

## 2020-01-31 NOTE — HISTORY OF PRESENT ILLNESS
[de-identified] : 54 year old woman again complains of rectal burning and pain. She feels a lump in her rectum. She denies rectal bleeding, melena or hematemesis. She is referred for a diagnostic colonoscopy. She has mild asthma well controlled.

## 2020-02-28 ENCOUNTER — APPOINTMENT (OUTPATIENT)
Dept: INTERNAL MEDICINE | Facility: CLINIC | Age: 55
End: 2020-02-28
Payer: COMMERCIAL

## 2020-02-28 VITALS
TEMPERATURE: 97.8 F | OXYGEN SATURATION: 99 % | SYSTOLIC BLOOD PRESSURE: 130 MMHG | HEART RATE: 85 BPM | RESPIRATION RATE: 18 BRPM | DIASTOLIC BLOOD PRESSURE: 82 MMHG

## 2020-02-28 DIAGNOSIS — J06.9 ACUTE UPPER RESPIRATORY INFECTION, UNSPECIFIED: ICD-10-CM

## 2020-02-28 PROCEDURE — 99213 OFFICE O/P EST LOW 20 MIN: CPT

## 2020-02-28 NOTE — PLAN
[FreeTextEntry1] : Cough\par -dry cough, no sputum production, no fevers or chills- no indication for antibiotics\par -rest, hydration\par -if symptoms worsening or develops fever proceed to urgent care or ER\par \par Of note: CDC web site checked- [place] NOT listed as an area of concern for Coronavirus

## 2020-02-28 NOTE — REVIEW OF SYSTEMS
[Cough] : cough [Headache] : headache [Negative] : Neurological [Night Sweats] : no night sweats [Fever] : no fever [Chills] : no chills

## 2020-02-28 NOTE — HISTORY OF PRESENT ILLNESS
[FreeTextEntry8] : Patient presents for sick visit. She was on a trip to [place] for the past 3 weeks, since Tuesday she started having a dry cough. She returned this morning. Denies any fevers, chills, sputum production, body aches, rashes. She has dry cough. Denies any shortness of breath, wheezing or other acute complaints.\par

## 2020-03-09 ENCOUNTER — APPOINTMENT (OUTPATIENT)
Dept: PULMONOLOGY | Facility: CLINIC | Age: 55
End: 2020-03-09
Payer: COMMERCIAL

## 2020-03-09 VITALS
RESPIRATION RATE: 16 BRPM | SYSTOLIC BLOOD PRESSURE: 119 MMHG | TEMPERATURE: 98.6 F | OXYGEN SATURATION: 99 % | HEART RATE: 86 BPM | DIASTOLIC BLOOD PRESSURE: 81 MMHG

## 2020-03-09 PROCEDURE — 99214 OFFICE O/P EST MOD 30 MIN: CPT | Mod: 25

## 2020-03-09 PROCEDURE — 94729 DIFFUSING CAPACITY: CPT

## 2020-03-09 PROCEDURE — 71046 X-RAY EXAM CHEST 2 VIEWS: CPT

## 2020-03-09 PROCEDURE — 94060 EVALUATION OF WHEEZING: CPT

## 2020-03-09 PROCEDURE — 94727 GAS DIL/WSHOT DETER LNG VOL: CPT

## 2020-03-09 NOTE — DISCUSSION/SUMMARY
[FreeTextEntry1] : LEONARD with poor CPAP usage- addressed risks /benefits- discussed  benefits re usage\par Mild asthma need daily compliance with use Symbicort and rinse\par    Hypertension borderline\par Proteinuria with history mild renal insufficiency\par Heme  noted\par continue singulair 10 mg QD\par Symbicort  and prn Proair as noted\par LEONARD- AUTO CPAP  6 - 18 cm H20\par  Proair before swimming 2 puffs\par  singulair 10  QD with food\par Anemia w/u and noted stool negative heme renal\par \par Recommendation patient to consider bariatric surgery\par Do believe her weight contributes to her sensation of shortness of breath\par Referral Cyrus Valiente MD\par \par f/u mammogram \par \par f/u RENAl for proteinuria\par \par Patient compliant with CPAP therapy.  Continue present settings.  Patient with demonstrated clinical benefit with daytime and nocturnal symptomatology improvement.\par \par Note has PCP for  overall  management and  will  defer other  medical  issues  to  her  care

## 2020-03-09 NOTE — REVIEW OF SYSTEMS
[Sinus Problems] : sinus problems [As Noted in HPI] : as noted in HPI [Hypertension] : ~T hypertension [Back Pain] : ~T back pain [Anemia] : anemia [Negative] : Pulmonary Hypertension [Ear Disturbance] : no ear disturbance [Nasal Congestion] : no nasal congestion [Epistaxis] : no nosebleeds [Postnasal Drip] : no postnasal drip [Sore Throat] : no sore throat [Dry Mouth] : no dry mouth [PND] : no PND [Orthopnea] : no orthopnea [Palpitations] : no palpitations [Edema] : ~T edema was not present [Claudication] : no intermittent claudication [Leg Cramps] : no leg cramps [FreeTextEntry5] : colonoscopy 2016 [FreeTextEntry6] : management Dr Abraham Escalante

## 2020-03-09 NOTE — PROCEDURE
[FreeTextEntry1] : PFT 3/9/2020\par mild OAD\par  pos  12 % BD at FEV1\par lung  volumes  normal\par  Normal DLCO\par  HGB 11.7\par \par Chest x-ray PA lateral March 9, 2020\par Normal cardiac size\par Clear lung fields without parenchymal infiltrates pleural effusions dominant pulmonary nodules\par Tissue bony structures normal\par Roxie mediastinum normal\par Impression clear lungs\par \par  CPAP DATA data thru 10/24/2019\par Usage 33 %\par Hours on average 4\par Order CPAP setting 6-18 cm H2O\par Average AHI 2.6\par \par Flu Vaccine  11/14/2019

## 2020-03-09 NOTE — HISTORY OF PRESENT ILLNESS
[Stable] : are stable [None] : ~He/She~ has no significant interval events [Difficulty Breathing During Exertion] : stable dyspnea on exertion [Feelings Of Weakness On Exertion] : stable exercise intolerance [Cough] : denies coughing [Wheezing] : denies wheezing [Regional Soft Tissue Swelling Both Lower Extremities] : denies lower extremity edema [Chest Pain Or Discomfort] : denies chest pain [Fever] : denies fever [Obstructive Sleep Apnea] : obstructive sleep apnea [Date: ___] : Date of most recent diagnostic polysomnogram: [unfilled] [AHI: ___ per hour] : Apnea-hypopnea index:  [unfilled] per hour [Wt Gain ___ Lbs] : no recent weight gain [Wt Loss ___ Lbs] : no recent weight loss [Oxygen] : the patient uses no supplemental oxygen [Nocturnal Oxygen] : The patient does not use nocturnal oxygen [FreeTextEntry1] : no active asthma sxs\par  issue with CPAP use \par does use So Clean but was not compliant felt could  still be dirty

## 2020-03-11 ENCOUNTER — RX RENEWAL (OUTPATIENT)
Age: 55
End: 2020-03-11

## 2020-03-25 ENCOUNTER — APPOINTMENT (OUTPATIENT)
Dept: GASTROENTEROLOGY | Facility: AMBULATORY MEDICAL SERVICES | Age: 55
End: 2020-03-25

## 2020-05-30 ENCOUNTER — RX RENEWAL (OUTPATIENT)
Age: 55
End: 2020-05-30

## 2020-06-09 ENCOUNTER — APPOINTMENT (OUTPATIENT)
Dept: PULMONOLOGY | Facility: CLINIC | Age: 55
End: 2020-06-09

## 2020-06-16 ENCOUNTER — APPOINTMENT (OUTPATIENT)
Dept: INTERNAL MEDICINE | Facility: CLINIC | Age: 55
End: 2020-06-16
Payer: COMMERCIAL

## 2020-06-16 VITALS
BODY MASS INDEX: 34.51 KG/M2 | SYSTOLIC BLOOD PRESSURE: 131 MMHG | WEIGHT: 233 LBS | OXYGEN SATURATION: 98 % | HEART RATE: 63 BPM | TEMPERATURE: 98.8 F | DIASTOLIC BLOOD PRESSURE: 89 MMHG | HEIGHT: 69 IN

## 2020-06-16 DIAGNOSIS — Z00.00 ENCOUNTER FOR GENERAL ADULT MEDICAL EXAMINATION W/OUT ABNORMAL FINDINGS: ICD-10-CM

## 2020-06-16 DIAGNOSIS — Z13.31 ENCOUNTER FOR SCREENING FOR DEPRESSION: ICD-10-CM

## 2020-06-16 DIAGNOSIS — Z11.59 ENCOUNTER FOR SCREENING FOR OTHER VIRAL DISEASES: ICD-10-CM

## 2020-06-16 LAB
BASOPHILS # BLD AUTO: 0.03 K/UL
BASOPHILS NFR BLD AUTO: 0.7 %
EOSINOPHIL # BLD AUTO: 0.14 K/UL
EOSINOPHIL NFR BLD AUTO: 3.2 %
HCT VFR BLD CALC: 38.4 %
HGB BLD-MCNC: 11.4 G/DL
IMM GRANULOCYTES NFR BLD AUTO: 0 %
LYMPHOCYTES # BLD AUTO: 1.61 K/UL
LYMPHOCYTES NFR BLD AUTO: 36.8 %
MAN DIFF?: NORMAL
MCHC RBC-ENTMCNC: 28 PG
MCHC RBC-ENTMCNC: 29.7 GM/DL
MCV RBC AUTO: 94.3 FL
MONOCYTES # BLD AUTO: 0.34 K/UL
MONOCYTES NFR BLD AUTO: 7.8 %
NEUTROPHILS # BLD AUTO: 2.26 K/UL
NEUTROPHILS NFR BLD AUTO: 51.5 %
PLATELET # BLD AUTO: 266 K/UL
RBC # BLD: 4.07 M/UL
RBC # FLD: 12.2 %
WBC # FLD AUTO: 4.38 K/UL

## 2020-06-16 PROCEDURE — 36415 COLL VENOUS BLD VENIPUNCTURE: CPT

## 2020-06-16 PROCEDURE — 99386 PREV VISIT NEW AGE 40-64: CPT | Mod: 25

## 2020-06-16 PROCEDURE — G0444 DEPRESSION SCREEN ANNUAL: CPT

## 2020-06-16 RX ORDER — HYDROCORTISONE ACETATE 25 MG/1
25 SUPPOSITORY RECTAL TWICE DAILY
Qty: 28 | Refills: 2 | Status: DISCONTINUED | COMMUNITY
Start: 2020-03-03 | End: 2020-06-16

## 2020-06-16 NOTE — PHYSICAL EXAM
[Well Nourished] : well nourished [No Acute Distress] : no acute distress [Normal Sclera/Conjunctiva] : normal sclera/conjunctiva [Well-Appearing] : well-appearing [Well Developed] : well developed [No Respiratory Distress] : no respiratory distress  [Normal Outer Ear/Nose] : the outer ears and nose were normal in appearance [PERRL] : pupils equal round and reactive to light [Clear to Auscultation] : lungs were clear to auscultation bilaterally [No Accessory Muscle Use] : no accessory muscle use [Normal Rate] : normal rate  [Regular Rhythm] : with a regular rhythm [Normal S1, S2] : normal S1 and S2 [Soft] : abdomen soft [No Edema] : there was no peripheral edema [Non Tender] : non-tender [Non-distended] : non-distended [Normal Bowel Sounds] : normal bowel sounds [Grossly Normal Strength/Tone] : grossly normal strength/tone [No CVA Tenderness] : no CVA  tenderness [Normal Gait] : normal gait [No Focal Deficits] : no focal deficits [Coordination Grossly Intact] : coordination grossly intact [Normal Affect] : the affect was normal [Alert and Oriented x3] : oriented to person, place, and time [Normal Insight/Judgement] : insight and judgment were intact

## 2020-06-16 NOTE — HEALTH RISK ASSESSMENT
[Yes] : Yes [Monthly or less (1 pt)] : Monthly or less (1 point) [1 or 2 (0 pts)] : 1 or 2 (0 points) [Never (0 pts)] : Never (0 points) [No] : In the past 12 months have you used drugs other than those required for medical reasons? No [0] : 1) Little interest or pleasure doing things: Not at all (0) [HIV test declined] : HIV test declined [Patient reported bone density results were normal] : Patient reported bone density results were normal [Patient reported mammogram was normal] : Patient reported mammogram was normal [Hepatitis C test declined] : Hepatitis C test declined [None] : None [With Family] : lives with family [Employed] : employed [] :  [# Of Children ___] : has [unfilled] children [Feels Safe at Home] : Feels safe at home [] : No [Audit-CScore] : 1 [[id number]] : 0 [Change in mental status noted] : No change in mental status noted [Reports changes in hearing] : Reports no changes in hearing [Language] : denies difficulty with language [Reports changes in vision] : Reports no changes in vision [Reports changes in dental health] : Reports no changes in dental health [MammogramDate] : 01/20 [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

## 2020-06-16 NOTE — HISTORY OF PRESENT ILLNESS
[FreeTextEntry1] : physical [de-identified] : Patient presents for routine physical. She has no acute complaints today. Denies fever, chills, HA, CP, SOB, abd pain, N/V/D, urinary concerns. She sees nephrology every 6 months for history of CKD, proteinuria. She was seen by cardiologist October 2019 and had stress test; she plans to schedule annual follow up. She has follow up with pulmonologist Dr Quiros for asthma, LEONARD upcoming.\par She has been working from home during COVID pandemic and taking appropriate precautions. No sick contacts that she knows of, interested in COVID Ab test.

## 2020-06-17 LAB
25(OH)D3 SERPL-MCNC: 21.7 NG/ML
ALBUMIN SERPL ELPH-MCNC: 4.5 G/DL
ALP BLD-CCNC: 82 U/L
ALT SERPL-CCNC: 22 U/L
ANION GAP SERPL CALC-SCNC: 10 MMOL/L
AST SERPL-CCNC: 19 U/L
BILIRUB SERPL-MCNC: 0.4 MG/DL
BUN SERPL-MCNC: 12 MG/DL
CALCIUM SERPL-MCNC: 10 MG/DL
CHLORIDE SERPL-SCNC: 102 MMOL/L
CHOLEST SERPL-MCNC: 183 MG/DL
CHOLEST/HDLC SERPL: 2.6 RATIO
CO2 SERPL-SCNC: 29 MMOL/L
CREAT SERPL-MCNC: 0.96 MG/DL
ESTIMATED AVERAGE GLUCOSE: 100 MG/DL
GLUCOSE SERPL-MCNC: 96 MG/DL
HBA1C MFR BLD HPLC: 5.1 %
HDLC SERPL-MCNC: 71 MG/DL
LDLC SERPL CALC-MCNC: 94 MG/DL
POTASSIUM SERPL-SCNC: 4.2 MMOL/L
PROT SERPL-MCNC: 7.6 G/DL
SARS-COV-2 IGG SERPL IA-ACNC: 0.01 INDEX
SARS-COV-2 IGG SERPL QL IA: NEGATIVE
SODIUM SERPL-SCNC: 141 MMOL/L
TRIGL SERPL-MCNC: 89 MG/DL
TSH SERPL-ACNC: 1.42 UIU/ML

## 2020-06-30 ENCOUNTER — APPOINTMENT (OUTPATIENT)
Dept: PULMONOLOGY | Facility: CLINIC | Age: 55
End: 2020-06-30
Payer: COMMERCIAL

## 2020-06-30 VITALS
HEART RATE: 70 BPM | OXYGEN SATURATION: 98 % | SYSTOLIC BLOOD PRESSURE: 122 MMHG | RESPIRATION RATE: 16 BRPM | TEMPERATURE: 97.5 F | DIASTOLIC BLOOD PRESSURE: 86 MMHG

## 2020-06-30 DIAGNOSIS — Z20.828 CONTACT WITH AND (SUSPECTED) EXPOSURE TO OTHER VIRAL COMMUNICABLE DISEASES: ICD-10-CM

## 2020-06-30 PROCEDURE — 99214 OFFICE O/P EST MOD 30 MIN: CPT

## 2020-06-30 NOTE — REVIEW OF SYSTEMS
[Sinus Problems] : sinus problems [As Noted in HPI] : as noted in HPI [Hypertension] : ~T hypertension [Back Pain] : ~T back pain [Anemia] : anemia [Negative] : Pulmonary Hypertension [Ear Disturbance] : no ear disturbance [Nasal Congestion] : no nasal congestion [Epistaxis] : no nosebleeds [Dry Mouth] : no dry mouth [Postnasal Drip] : no postnasal drip [Sore Throat] : no sore throat [PND] : no PND [Palpitations] : no palpitations [Orthopnea] : no orthopnea [Claudication] : no intermittent claudication [Edema] : ~T edema was not present [Leg Cramps] : no leg cramps [FreeTextEntry5] : colonoscopy 2016 [FreeTextEntry6] : management Dr Abraham Escalante

## 2020-06-30 NOTE — DISCUSSION/SUMMARY
[FreeTextEntry1] : LEONARD with significant improvement of CPAP usage- addressed risks /benefits- discussed  benefits re usage\par Mild asthma need daily compliance with use Symbicort and rinse\par    Hypertension borderline\par Proteinuria with history mild renal insufficiency\par Heme  noted\par continue singulair 10 mg QD\par Symbicort  and prn Proair as noted\par LEONARD- AUTO CPAP  6 - 18 cm H20\par  Proair before swimming 2 puffs\par  singulair 10  QD with food\par Anemia w/u and noted stool negative heme renal\par \par Recommendation patient to consider bariatric surgery\par Do believe her weight contributes to her sensation of shortness of breath\par Referral Cyrus Valiente MD- on hold\par \par f/u mammogram \par pending Colonoscopy\par f/u RENAl for proteinuria- Med f/u\par \par Patient compliant with CPAP therapy.  Continue present settings.  Patient with demonstrated clinical benefit with daytime and nocturnal symptomatology improvement.\par \par DEXA  Aug 2020\par Note has PCP for  overall  management and  will  defer other  medical  issues  to  her  care

## 2020-06-30 NOTE — HISTORY OF PRESENT ILLNESS
[None] : ~He/She~ has no significant interval events [Stable] : are stable [Feelings Of Weakness On Exertion] : stable exercise intolerance [Difficulty Breathing During Exertion] : stable dyspnea on exertion [Wheezing] : denies wheezing [Cough] : denies coughing [Chest Pain Or Discomfort] : denies chest pain [Regional Soft Tissue Swelling Both Lower Extremities] : denies lower extremity edema [Fever] : denies fever [Date: ___] : Date of most recent diagnostic polysomnogram: [unfilled] [Obstructive Sleep Apnea] : obstructive sleep apnea [AHI: ___ per hour] : Apnea-hypopnea index:  [unfilled] per hour [Wt Gain ___ Lbs] : no recent weight gain [Oxygen] : the patient uses no supplemental oxygen [Wt Loss ___ Lbs] : no recent weight loss [Nocturnal Oxygen] : The patient does not use nocturnal oxygen [FreeTextEntry1] : no active asthma sxs\par  issue with CPAP use \par does use So Clean but was not compliant felt could  still be dirty

## 2020-06-30 NOTE — PHYSICAL EXAM
[General Appearance - Well Developed] : well developed [Normal Appearance] : normal appearance [General Appearance - Well Nourished] : well nourished [Well Groomed] : well groomed [Normal Conjunctiva] : the conjunctiva exhibited no abnormalities [No Deformities] : no deformities [General Appearance - In No Acute Distress] : no acute distress [Eyelids - No Xanthelasma] : the eyelids demonstrated no xanthelasmas [II] : II [Normal Oropharynx] : normal oropharynx [Neck Appearance] : the appearance of the neck was normal [Neck Cervical Mass (___cm)] : no neck mass was observed [Jugular Venous Distention Increased] : there was no jugular-venous distention [Thyroid Diffuse Enlargement] : the thyroid was not enlarged [Heart Sounds] : normal S1 and S2 [Murmurs] : no murmurs present [Heart Rate And Rhythm] : heart rate and rhythm were normal [Edema] : no peripheral edema present [Arterial Pulses Normal] : the arterial pulses were normal [Veins - Varicosity Changes] : no varicosital changes were noted in the lower extremities [Auscultation Breath Sounds / Voice Sounds] : lungs were clear to auscultation bilaterally [Exaggerated Use Of Accessory Muscles For Inspiration] : no accessory muscle use [Respiration, Rhythm And Depth] : normal respiratory rhythm and effort [Lungs Percussion] : the lungs were normal to percussion [Chest Palpation] : palpation of the chest revealed no abnormalities [Abdomen Soft] : soft [Abdomen Tenderness] : non-tender [Abdomen Mass (___ Cm)] : no abdominal mass palpated [Abnormal Walk] : normal gait [Cyanosis, Localized] : no localized cyanosis [Gait - Sufficient For Exercise Testing] : the gait was sufficient for exercise testing [Nail Clubbing] : no clubbing of the fingernails [Petechial Hemorrhages (___cm)] : no petechial hemorrhages [] : no rash [Skin Color & Pigmentation] : normal skin color and pigmentation [Skin Lesions] : no skin lesions [No Venous Stasis] : no venous stasis [No Skin Ulcers] : no skin ulcer [No Xanthoma] : no  xanthoma was observed [Deep Tendon Reflexes (DTR)] : deep tendon reflexes were 2+ and symmetric [Sensation] : the sensory exam was normal to light touch and pinprick [No Focal Deficits] : no focal deficits [Oriented To Time, Place, And Person] : oriented to person, place, and time [Impaired Insight] : insight and judgment were intact [Affect] : the affect was normal [FreeTextEntry1] : non icteric

## 2020-06-30 NOTE — PROCEDURE
[FreeTextEntry1] : PFT 3/9/2020\par mild OAD\par  pos  12 % BD at FEV1\par lung  volumes  normal\par  Normal DLCO\par  HGB 11.7\par \par Chest x-ray PA lateral March 9, 2020\par Normal cardiac size\par Clear lung fields without parenchymal infiltrates pleural effusions dominant pulmonary nodules\par Tissue bony structures normal\par Roxie mediastinum normal\par Impression clear lungs\par \par  CPAP DATA data June 30, 2020\par Usage 93  %\par Hours on average 6\par Order CPAP setting 6-18 cm H2O\par Average AHI 1.4\par \par Flu Vaccine  11/14/2019

## 2020-07-13 ENCOUNTER — RX RENEWAL (OUTPATIENT)
Age: 55
End: 2020-07-13

## 2020-07-23 ENCOUNTER — APPOINTMENT (OUTPATIENT)
Dept: INTERNAL MEDICINE | Facility: CLINIC | Age: 55
End: 2020-07-23
Payer: COMMERCIAL

## 2020-07-23 VITALS
HEIGHT: 69 IN | TEMPERATURE: 99.1 F | WEIGHT: 235 LBS | BODY MASS INDEX: 34.8 KG/M2 | SYSTOLIC BLOOD PRESSURE: 122 MMHG | OXYGEN SATURATION: 98 % | DIASTOLIC BLOOD PRESSURE: 81 MMHG | HEART RATE: 74 BPM

## 2020-07-23 PROCEDURE — 99214 OFFICE O/P EST MOD 30 MIN: CPT

## 2020-07-23 NOTE — PLAN
[FreeTextEntry1] : HTN\par -better controlled today\par -continue olmesartan HCTZ\par \par Low back pain\par -states she plans to try and see chiropractor\par -recommend ortho follow up

## 2020-07-23 NOTE — HISTORY OF PRESENT ILLNESS
[FreeTextEntry1] : BP follow up [de-identified] : Patient here for one month follow up to check BP. She feels well overall but has chronic back pain issues which have been ongoing for many years. She was last seen by orthopedist Dr Escalante December 2018, had lumbar MRI in January 2019. She has tried physical therapy without any relief. She has tried working out regularly to strengthen her core muscles but again no significant relief. \par

## 2020-07-23 NOTE — PHYSICAL EXAM
[Well Nourished] : well nourished [No Acute Distress] : no acute distress [Well Developed] : well developed [No Respiratory Distress] : no respiratory distress  [Clear to Auscultation] : lungs were clear to auscultation bilaterally [No Accessory Muscle Use] : no accessory muscle use [Normal Rate] : normal rate  [Regular Rhythm] : with a regular rhythm [Normal S1, S2] : normal S1 and S2 [Alert and Oriented x3] : oriented to person, place, and time [No Focal Deficits] : no focal deficits

## 2020-08-07 DIAGNOSIS — Z01.818 ENCOUNTER FOR OTHER PREPROCEDURAL EXAMINATION: ICD-10-CM

## 2020-08-08 ENCOUNTER — APPOINTMENT (OUTPATIENT)
Dept: DISASTER EMERGENCY | Facility: CLINIC | Age: 55
End: 2020-08-08

## 2020-08-08 LAB — SARS-COV-2 N GENE NPH QL NAA+PROBE: NOT DETECTED

## 2020-08-10 ENCOUNTER — RX RENEWAL (OUTPATIENT)
Age: 55
End: 2020-08-10

## 2020-08-11 ENCOUNTER — APPOINTMENT (OUTPATIENT)
Dept: PULMONOLOGY | Facility: CLINIC | Age: 55
End: 2020-08-11

## 2020-08-11 ENCOUNTER — APPOINTMENT (OUTPATIENT)
Dept: PULMONOLOGY | Facility: CLINIC | Age: 55
End: 2020-08-11
Payer: COMMERCIAL

## 2020-08-11 VITALS
RESPIRATION RATE: 14 BRPM | DIASTOLIC BLOOD PRESSURE: 70 MMHG | HEART RATE: 74 BPM | OXYGEN SATURATION: 99 % | TEMPERATURE: 97.2 F | SYSTOLIC BLOOD PRESSURE: 102 MMHG

## 2020-08-11 PROCEDURE — 94727 GAS DIL/WSHOT DETER LNG VOL: CPT

## 2020-08-11 PROCEDURE — 94060 EVALUATION OF WHEEZING: CPT

## 2020-08-11 PROCEDURE — 95012 NITRIC OXIDE EXP GAS DETER: CPT

## 2020-08-11 PROCEDURE — 94729 DIFFUSING CAPACITY: CPT

## 2020-08-12 ENCOUNTER — APPOINTMENT (OUTPATIENT)
Dept: GASTROENTEROLOGY | Facility: AMBULATORY MEDICAL SERVICES | Age: 55
End: 2020-08-12
Payer: COMMERCIAL

## 2020-08-12 PROCEDURE — 45380 COLONOSCOPY AND BIOPSY: CPT | Mod: 33

## 2020-09-11 ENCOUNTER — RX RENEWAL (OUTPATIENT)
Age: 55
End: 2020-09-11

## 2020-09-16 ENCOUNTER — APPOINTMENT (OUTPATIENT)
Dept: ORTHOPEDIC SURGERY | Facility: CLINIC | Age: 55
End: 2020-09-16
Payer: COMMERCIAL

## 2020-09-16 VITALS — TEMPERATURE: 96 F

## 2020-09-16 PROCEDURE — 99214 OFFICE O/P EST MOD 30 MIN: CPT

## 2020-09-16 NOTE — HISTORY OF PRESENT ILLNESS
[de-identified] : 55 year female presents for evaluation of lower back pain.\par Patient was last seen in Dec 2018 and MRI lumbar was ordered. \par She states that pain radiates down B/L LE, but mostly on the right. It radiates posteriorly down RLE through buttock to posterior knee.\par Has numbness/tingling of RLE. \par Standing, sitting, laying on her abdomen, prolonged walking aggravates her pain.\par Does not take medications for pain. \par She tried PT in the past but it has not helped her.\par She is interested in going to a chiropractor but has not gone yet.\par She tried acupuncture therapy and felt some relief.\par Denies history of epidurals for pain. She said she went for a branch block x3 in 2016 and did not feel relief. \par Has history of CKD. \par No fever chills sweats nausea vomiting no bowel or bladder dysfunction, no recent weight loss or gain no night pain. This history is in addition to the intake form that I personally reviewed.  [Stable] : stable

## 2020-09-16 NOTE — ADDENDUM
[FreeTextEntry1] : This note was authored by Ramya Scales working as a medical scribe for Dr. Abraham Escalante. The note was reviewed, edited, and revised by Dr. Abraham Escalante whom is in agreement with the exam findings, imaging findings, and treatment plan. 09/16/2020.

## 2020-09-16 NOTE — PHYSICAL EXAM
[Normal] : Gait: normal [Mayers's Sign] : negative Mayers's sign [Pronator Drift] : negative pronator drift [SLR] : negative straight leg raise [de-identified] : 5 out of 5 motor strength, sensation is intact and symmetrical full range of motion flexion extension and rotation, no palpatory tenderness full range of motion of hips knees shoulders and elbows (all four extremities), no atrophy, negative straight leg raise, no pathological reflexes, no swelling, normal ambulation, no apparent distress skin is intact, no palpable lymph nodes, no upper or lower extremity instability, alert and oriented x3 and normal mood. Normal finger-to nose test.  [de-identified] :  MR SPINE LUMBAR 01/05/2019 (PACS)\par \par DISC LEVEL EVALUATION: \par \par L1/L2: Normal \par L2/L3: Normal \par L3/L4: There is minimal disc bulging with mild facet arthrosis. There is \par minimal foraminal narrowing. \par L4/L5: There is moderate right facet arthrosis with associated bone marrow \par edema within the bones of the facet joints. There is perifacet soft tissue \par edema as well. There is mild left facet arthrosis. There is a small \par broad-based posterior disc protrusion that is slightly asymmetric to the \par right causing mild right foraminal narrowing. \par L5/S1: No central canal or foraminal narrowing. \par \par SPINAL ALIGNMENT: Normal \par DISTAL CORD AND CONUS: The distal cord is normal in appearance. The conus \par terminates at L1 and is unremarkable. \par SI JOINTS: Normal \par MARROW: Bone marrow edema within the bones of the right-sided L4-L5 facet \par joint. \par PARASPINAL MUSCLE AND SOFT TISSUES: Normal \par INTRAABDOMINAL/INTRAPELVIC SOFT TISSUES: Normal \par \par IMPRESSION: Moderate right facet arthrosis at L4-L5 with associated bone \par marrow edema and perifacet soft tissue edema. Small posterior disc \par protrusion resulting in mild right foraminal narrowing. \par \par No significant change compared to the prior examination. \par \par

## 2020-09-16 NOTE — DISCUSSION/SUMMARY
[de-identified] : L4-5 Mild Stenosis.\par Discussed all options. \par She's not ready for surgery..\par In the past she did swim and do exercises.\par She required Chiropractic Care.\par All options discussed including rest, medicine, home exercise, acupuncture, Chiropractic care, Physical Therapy, Pain management, and last resort surgery. All questions were answered, all alternatives discussed and the patient is in complete agreement with that plan. Follow-up appointment as instructed. Any issues and the patient will call or come in sooner. \par F/U 1 month.

## 2020-09-17 ENCOUNTER — APPOINTMENT (OUTPATIENT)
Dept: PULMONOLOGY | Facility: CLINIC | Age: 55
End: 2020-09-17
Payer: COMMERCIAL

## 2020-09-17 VITALS
RESPIRATION RATE: 14 BRPM | DIASTOLIC BLOOD PRESSURE: 71 MMHG | SYSTOLIC BLOOD PRESSURE: 104 MMHG | OXYGEN SATURATION: 98 % | TEMPERATURE: 97.3 F | HEART RATE: 77 BPM

## 2020-09-17 PROCEDURE — 94618 PULMONARY STRESS TESTING: CPT

## 2020-09-17 PROCEDURE — 77085 DXA BONE DENSITY AXL VRT FX: CPT

## 2020-09-17 PROCEDURE — 99214 OFFICE O/P EST MOD 30 MIN: CPT | Mod: 25

## 2020-09-17 NOTE — PROCEDURE
[FreeTextEntry1] : Pulmonary 9/17/20\par normal study\par no desaturation on RA \par \par DEXA 9/17/20\par normal study\par \par 8/11/2020\par NIOX 50\par PFT\par  spirometry normal\par 30% response to bronchodilator at the FEV1\par Normal lung volumes\par Diffusion normal 92% predicted.\par Positive bronchodilator response\par \par Chest x-ray PA lateral March 9, 2020\par Normal cardiac size\par Clear lung fields without parenchymal infiltrates pleural effusions dominant pulmonary nodules\par Tissue bony structures normal\par Roxie mediastinum normal\par Impression clear lungs\par \par  CPAP DATA data June 30, 2020\par Usage 93  %\par Hours on average 6\par Order CPAP setting 6-18 cm H2O\par Average AHI 1.4\par \par Flu Vaccine  11/14/2019

## 2020-09-17 NOTE — DISCUSSION/SUMMARY
[FreeTextEntry1] : LEONARD with significant improvement of CPAP usage- addressed risks /benefits- discussed  benefits re usage\par Mild asthma need daily compliance with use Symbicort and rinse\par    Hypertension borderline\par Proteinuria with history mild renal insufficiency\par Heme  noted\par continue singulair 10 mg QD\par Symbicort  and prn Proair as noted\par LEONARD- AUTO CPAP  6 - 18 cm H20\par  Proair before swimming 2 puffs\par  singulair 10  QD with food\par Anemia w/u and noted stool negative heme renal\par \par Recommendation patient to consider bariatric surgery\par Do believe her weight contributes to her sensation of shortness of breath\par Referral Cyrus Valiente MD- on hold\par \par f/u mammogram per PMD management\par  Colonoscopy- completed with Dr Birch\par f/u RENAl for proteinuria- Med f/u\par \par Patient compliant with CPAP therapy.  Continue present settings.  Patient with demonstrated clinical benefit with daytime and nocturnal symptomatology improvement.\par \par DEXA  Aug 2020 f/u 2 years\par Note has PCP for  overall  management and  will  defer other  medical  issues  to  her  care\par  \par readdress F/u Vaccine  at f/u at pt request

## 2020-09-29 ENCOUNTER — APPOINTMENT (OUTPATIENT)
Dept: GASTROENTEROLOGY | Facility: CLINIC | Age: 55
End: 2020-09-29
Payer: COMMERCIAL

## 2020-09-29 VITALS
HEART RATE: 82 BPM | SYSTOLIC BLOOD PRESSURE: 130 MMHG | HEIGHT: 69 IN | OXYGEN SATURATION: 99 % | BODY MASS INDEX: 33.77 KG/M2 | TEMPERATURE: 98.6 F | DIASTOLIC BLOOD PRESSURE: 70 MMHG | WEIGHT: 228 LBS

## 2020-09-29 DIAGNOSIS — Z86.010 PERSONAL HISTORY OF COLONIC POLYPS: ICD-10-CM

## 2020-09-29 PROCEDURE — 99213 OFFICE O/P EST LOW 20 MIN: CPT

## 2020-09-29 NOTE — HISTORY OF PRESENT ILLNESS
[de-identified] : 55 year old woman with rectal lump. her lump is gone . She complains of occasional gas and bloating. she underwent a colonoscopy and biopsy on 8/12 that was unremarkable.

## 2020-10-17 ENCOUNTER — RX RENEWAL (OUTPATIENT)
Age: 55
End: 2020-10-17

## 2020-10-17 ENCOUNTER — APPOINTMENT (OUTPATIENT)
Dept: DISASTER EMERGENCY | Facility: CLINIC | Age: 55
End: 2020-10-17

## 2020-10-18 ENCOUNTER — TRANSCRIPTION ENCOUNTER (OUTPATIENT)
Age: 55
End: 2020-10-18

## 2020-10-18 LAB — SARS-COV-2 N GENE NPH QL NAA+PROBE: NOT DETECTED

## 2020-10-22 ENCOUNTER — APPOINTMENT (OUTPATIENT)
Dept: PULMONOLOGY | Facility: CLINIC | Age: 55
End: 2020-10-22
Payer: COMMERCIAL

## 2020-10-22 VITALS
OXYGEN SATURATION: 100 % | TEMPERATURE: 97.9 F | SYSTOLIC BLOOD PRESSURE: 123 MMHG | HEIGHT: 69 IN | HEART RATE: 79 BPM | BODY MASS INDEX: 34.07 KG/M2 | WEIGHT: 230 LBS | RESPIRATION RATE: 16 BRPM | DIASTOLIC BLOOD PRESSURE: 80 MMHG

## 2020-10-22 PROCEDURE — 94727 GAS DIL/WSHOT DETER LNG VOL: CPT

## 2020-10-22 PROCEDURE — 90686 IIV4 VACC NO PRSV 0.5 ML IM: CPT

## 2020-10-22 PROCEDURE — G0008: CPT

## 2020-10-22 PROCEDURE — 99072 ADDL SUPL MATRL&STAF TM PHE: CPT

## 2020-10-22 PROCEDURE — 94729 DIFFUSING CAPACITY: CPT

## 2020-10-22 PROCEDURE — 99214 OFFICE O/P EST MOD 30 MIN: CPT | Mod: 25

## 2020-10-22 PROCEDURE — 94060 EVALUATION OF WHEEZING: CPT

## 2020-10-22 NOTE — PROCEDURE
[FreeTextEntry1] : NIOX 69 PPD October 22, 2020\par PFT October 22, 2020\par Mild reduction flow rates with a mild obstructive ventilatory impairment\par No significant response to bronchodilator at the FEV1 measuring 7%\par Borderline response at the small airways of 15%\par Lung volumes demonstrate normal total lung capacity\par Air trapping with RV/TLC ratio 38% predicted and a decreased ERV likely secondary to patient increased abdominal girth\par Diffusion normal 100% predicted.\par Hemoglobin 11.4\par Data compared to August 11, 2020 does demonstrate some mild reduction at the flow rates\par \par DEXA 9/17/20\par normal study\par \par 8/11/2020\par NIOX 50\par PFT\par  spirometry normal\par 30% response to bronchodilator at the FEV1\par Normal lung volumes\par Diffusion normal 92% predicted.\par Positive bronchodilator response\par \par Chest x-ray PA lateral March 9, 2020\par Normal cardiac size\par Clear lung fields without parenchymal infiltrates pleural effusions dominant pulmonary nodules\par Tissue bony structures normal\par Roxie mediastinum normal\par Impression clear lungs\par \par  CPAP DATA data June 30, 2020\par Usage 93  %\par Hours on average 6\par Order CPAP setting 6-18 cm H2O\par Average AHI 1.4\par \par Flu Vaccine  11/14/2019

## 2020-10-22 NOTE — DISCUSSION/SUMMARY
[FreeTextEntry1] : LEONARD with significant improvement of CPAP usage- addressed risks /benefits- discussed  benefits re usage\par Mild asthma need daily compliance with use Symbicort and rinse\par    Hypertension borderline\par Proteinuria with history mild renal insufficiency\par Heme  noted\par continue singulair 10 mg QD\par Symbicort  and prn Proair as noted\par LEONARD- AUTO CPAP  6 - 18 cm H20\par  Proair before swimming 2 puffs\par  singulair 10  QD with food\par Anemia w/u and noted stool negative heme renal\par ADDED Sample spiriva 2.5 mcg 2 puffs QD\par \par Recommendation patient to consider bariatric surgery\par Do believe her weight contributes to her sensation of shortness of breath\par Referral Cyrus Valiente MD- on hold\par \par f/u mammogram per PMD management\par  Colonoscopy- completed with Dr Birch\par f/u RENAl for proteinuria- Med f/u\par \par Patient compliant with CPAP therapy.  Continue present settings.  Patient with demonstrated clinical benefit with daytime and nocturnal symptomatology improvement.\par \par DEXA  Aug 2020 f/u 2 years\par Note has PCP for  overall  management and  will  defer other  medical  issues  to  her  care\par  \par readdress F/u Vaccine  at f/u at pt request

## 2020-11-03 ENCOUNTER — RESULT REVIEW (OUTPATIENT)
Age: 55
End: 2020-11-03

## 2020-11-18 ENCOUNTER — APPOINTMENT (OUTPATIENT)
Dept: DISASTER EMERGENCY | Facility: CLINIC | Age: 55
End: 2020-11-18

## 2020-11-19 LAB — SARS-COV-2 N GENE NPH QL NAA+PROBE: NOT DETECTED

## 2020-11-20 ENCOUNTER — APPOINTMENT (OUTPATIENT)
Dept: INTERNAL MEDICINE | Facility: CLINIC | Age: 55
End: 2020-11-20
Payer: COMMERCIAL

## 2020-11-20 VITALS
HEIGHT: 69 IN | TEMPERATURE: 98.4 F | OXYGEN SATURATION: 97 % | WEIGHT: 232 LBS | HEART RATE: 86 BPM | SYSTOLIC BLOOD PRESSURE: 125 MMHG | BODY MASS INDEX: 34.36 KG/M2 | DIASTOLIC BLOOD PRESSURE: 85 MMHG

## 2020-11-20 VITALS — TEMPERATURE: 98.4 F

## 2020-11-20 PROCEDURE — 99214 OFFICE O/P EST MOD 30 MIN: CPT

## 2020-11-20 NOTE — HISTORY OF PRESENT ILLNESS
[de-identified] : Patient here for follow up.\par She is stable and doing well overall. At last visit with Dr Quiros she was told of "inflammation in her lungs" and she has follow up scheduled next week. Her asthma is controlled on Symbicort BID and ProAir as needed, also recently started on Spiriva daily. \par She had a colonoscopy in August 2020 without acute findings. She is working on weight loss and considering bariatric surgery.

## 2020-11-20 NOTE — PLAN
[FreeTextEntry1] : BP stable on medication\par Pulm follow up next week\par Flu shot 10/20\par Mammogram 1/20\par Colonoscopy 8/20\par Considering bariatric surgery

## 2020-11-23 ENCOUNTER — APPOINTMENT (OUTPATIENT)
Dept: PULMONOLOGY | Facility: CLINIC | Age: 55
End: 2020-11-23
Payer: COMMERCIAL

## 2020-11-23 VITALS
WEIGHT: 225 LBS | HEIGHT: 69 IN | RESPIRATION RATE: 16 BRPM | TEMPERATURE: 96.5 F | HEART RATE: 76 BPM | OXYGEN SATURATION: 98 % | SYSTOLIC BLOOD PRESSURE: 98 MMHG | BODY MASS INDEX: 33.33 KG/M2 | DIASTOLIC BLOOD PRESSURE: 26 MMHG

## 2020-11-23 PROCEDURE — 94729 DIFFUSING CAPACITY: CPT

## 2020-11-23 PROCEDURE — 99214 OFFICE O/P EST MOD 30 MIN: CPT | Mod: 25

## 2020-11-23 PROCEDURE — ZZZZZ: CPT

## 2020-11-23 PROCEDURE — 94727 GAS DIL/WSHOT DETER LNG VOL: CPT

## 2020-11-23 PROCEDURE — 94060 EVALUATION OF WHEEZING: CPT

## 2020-11-23 NOTE — HISTORY OF PRESENT ILLNESS
[Stable] : are stable [None] : ~He/She~ has no significant interval events [Difficulty Breathing During Exertion] : stable dyspnea on exertion [Feelings Of Weakness On Exertion] : stable exercise intolerance [Cough] : denies coughing [Wheezing] : denies wheezing [Regional Soft Tissue Swelling Both Lower Extremities] : denies lower extremity edema [Chest Pain Or Discomfort] : denies chest pain [Fever] : denies fever [Obstructive Sleep Apnea] : obstructive sleep apnea [Date: ___] : Date of most recent diagnostic polysomnogram: [unfilled] [AHI: ___ per hour] : Apnea-hypopnea index:  [unfilled] per hour [Wt Gain ___ Lbs] : no recent weight gain [Wt Loss ___ Lbs] : no recent weight loss [Oxygen] : the patient uses no supplemental oxygen [Nocturnal Oxygen] : The patient does not use nocturnal oxygen [FreeTextEntry1] : no active asthma sxs\par  issue with CPAP use \par does use [employer] but was not compliant \par no viral\par  no travel\par

## 2020-11-23 NOTE — PROCEDURE
[FreeTextEntry1] : PFT Nov 23 2020\par  Mild OAD\par normal lung volumes\par Normal DLCO\par  HGB 11.4 \par Interval improvement at Flow Rates \par \par NIOX 69 PPD October 22, 2020\par PFT October 22, 2020\par Mild reduction flow rates with a mild obstructive ventilatory impairment\par No significant response to bronchodilator at the FEV1 measuring 7%\par Borderline response at the small airways of 15%\par Lung volumes demonstrate normal total lung capacity\par Air trapping with RV/TLC ratio 38% predicted and a decreased ERV likely secondary to patient increased abdominal girth\par Diffusion normal 100% predicted.\par Hemoglobin 11.4\par Data compared to August 11, 2020 does demonstrate some mild reduction at the flow rates\par \par DEXA 9/17/20\par normal study\par \par 8/11/2020\par NIOX 50\par PFT\par  spirometry normal\par 30% response to bronchodilator at the FEV1\par Normal lung volumes\par Diffusion normal 92% predicted.\par Positive bronchodilator response\par \par Chest x-ray PA lateral March 9, 2020\par Normal cardiac size\par Clear lung fields without parenchymal infiltrates pleural effusions dominant pulmonary nodules\par Tissue bony structures normal\par Roxie mediastinum normal\par Impression clear lungs\par \par  CPAP DATA data June 30, 2020\par Usage 93  %\par Hours on average 6\par Order CPAP setting 6-18 cm H2O\par Average AHI 1.4\par \par Flu Vaccine  11/14/2019

## 2020-11-23 NOTE — DISCUSSION/SUMMARY
[FreeTextEntry1] : LEONARD with significant improvement of CPAP usage- addressed risks /benefits- discussed  benefits re usage\par Mild asthma need daily compliance with use Symbicort and rinse/ Spiuriva and improvement flow rates\par    Hypertension borderline\par Proteinuria with history mild renal insufficiency\par Heme  noted\par continue singulair 10 mg QD\par Symbicort  and prn Proair as noted\par LEONARD- AUTO CPAP  6 - 18 cm H20\par  Proair before swimming 2 puffs\par  singulair 10  QD with food\par Anemia w/u and noted stool negative heme renal\par ADDED  spiriva 2.5 mcg 2 puffs QD\par \par Recommendation patient to consider bariatric surgery\par Do believe her weight contributes to her sensation of shortness of breath\par Referral Cyrus Valiente MD- on hold\par \par f/u mammogram per PMD management\par  Colonoscopy- completed with Dr Birch\par f/u RENAl for proteinuria- Med f/u\par \par Patient compliant with CPAP therapy.  Continue present settings.  Patient with demonstrated clinical benefit with daytime and nocturnal symptomatology improvement.\par \par DEXA  Aug 2020 f/u 2 years\par Note has PCP for  overall  management and  will  defer other  medical  issues  to  her  care\par  \par

## 2020-12-21 PROBLEM — J06.9 ACUTE URI: Status: RESOLVED | Noted: 2019-03-07 | Resolved: 2020-12-21

## 2020-12-23 PROBLEM — J06.9 VIRAL URI WITH COUGH: Status: RESOLVED | Noted: 2020-02-28 | Resolved: 2020-12-23

## 2021-01-20 ENCOUNTER — NON-APPOINTMENT (OUTPATIENT)
Age: 56
End: 2021-01-20

## 2021-01-20 ENCOUNTER — APPOINTMENT (OUTPATIENT)
Dept: INTERNAL MEDICINE | Facility: CLINIC | Age: 56
End: 2021-01-20
Payer: COMMERCIAL

## 2021-01-20 VITALS — SYSTOLIC BLOOD PRESSURE: 135 MMHG | DIASTOLIC BLOOD PRESSURE: 90 MMHG

## 2021-01-20 VITALS
TEMPERATURE: 98.4 F | OXYGEN SATURATION: 95 % | BODY MASS INDEX: 33.33 KG/M2 | DIASTOLIC BLOOD PRESSURE: 86 MMHG | HEIGHT: 69 IN | HEART RATE: 84 BPM | SYSTOLIC BLOOD PRESSURE: 143 MMHG | WEIGHT: 225 LBS

## 2021-01-20 VITALS — SYSTOLIC BLOOD PRESSURE: 135 MMHG | DIASTOLIC BLOOD PRESSURE: 85 MMHG

## 2021-01-20 PROCEDURE — 93000 ELECTROCARDIOGRAM COMPLETE: CPT

## 2021-01-20 PROCEDURE — 99214 OFFICE O/P EST MOD 30 MIN: CPT | Mod: 25

## 2021-01-20 PROCEDURE — 99072 ADDL SUPL MATRL&STAF TM PHE: CPT

## 2021-01-20 NOTE — HISTORY OF PRESENT ILLNESS
[de-identified] : Patient here for follow up.\par She continues to deal with low back pain, has been getting acupuncture with minimal relief. She plans to follow up with Dr Escalante, will be repeating MRI soon.\par Over the past week she notes some upper back pain as well which is worse when she is sleeping at night and laying down. Feels that it is in the middle of her chest, not a chest pain but feels something there. Not happening during the day. Denies acute shortness of breath, no GOMEZ she is able to exercise on treadmill and on stair machine.

## 2021-01-20 NOTE — PLAN
[FreeTextEntry1] : Patient is s/p ECHO and stress test for chest tightness October 2019 without acute findings. She has not followed up with cardiology since then. EKG done today.\par BP improved on manual repeat readings, advised to monitor BPs at home and follow up in 1 month to monitor\par She has f/u with Dr Quiros next month\par To f/u with orthopedist Dr Escalante regarding MRI and further mgmt of back pain

## 2021-01-29 ENCOUNTER — APPOINTMENT (OUTPATIENT)
Dept: PULMONOLOGY | Facility: CLINIC | Age: 56
End: 2021-01-29
Payer: COMMERCIAL

## 2021-01-29 ENCOUNTER — NON-APPOINTMENT (OUTPATIENT)
Age: 56
End: 2021-01-29

## 2021-01-29 VITALS
HEART RATE: 87 BPM | BODY MASS INDEX: 33.33 KG/M2 | TEMPERATURE: 97.2 F | OXYGEN SATURATION: 100 % | HEIGHT: 69 IN | WEIGHT: 225 LBS | SYSTOLIC BLOOD PRESSURE: 130 MMHG | DIASTOLIC BLOOD PRESSURE: 84 MMHG

## 2021-01-29 VITALS — TEMPERATURE: 96.7 F

## 2021-01-29 LAB — DEPRECATED D DIMER PPP IA-ACNC: 201 NG/ML DDU

## 2021-01-29 PROCEDURE — 99214 OFFICE O/P EST MOD 30 MIN: CPT | Mod: 25

## 2021-01-29 PROCEDURE — 99072 ADDL SUPL MATRL&STAF TM PHE: CPT

## 2021-01-29 PROCEDURE — 71046 X-RAY EXAM CHEST 2 VIEWS: CPT

## 2021-01-29 NOTE — PHYSICAL EXAM
[General Appearance - Well Developed] : well developed [Normal Appearance] : normal appearance [General Appearance - Well Nourished] : well nourished [Well Groomed] : well groomed [No Deformities] : no deformities [General Appearance - In No Acute Distress] : no acute distress [Normal Conjunctiva] : the conjunctiva exhibited no abnormalities [Eyelids - No Xanthelasma] : the eyelids demonstrated no xanthelasmas [Normal Oropharynx] : normal oropharynx [II] : II [Neck Appearance] : the appearance of the neck was normal [Neck Cervical Mass (___cm)] : no neck mass was observed [Jugular Venous Distention Increased] : there was no jugular-venous distention [Thyroid Diffuse Enlargement] : the thyroid was not enlarged [Heart Rate And Rhythm] : heart rate and rhythm were normal [Heart Sounds] : normal S1 and S2 [Murmurs] : no murmurs present [Arterial Pulses Normal] : the arterial pulses were normal [Edema] : no peripheral edema present [Veins - Varicosity Changes] : no varicosital changes were noted in the lower extremities [Respiration, Rhythm And Depth] : normal respiratory rhythm and effort [Exaggerated Use Of Accessory Muscles For Inspiration] : no accessory muscle use [Auscultation Breath Sounds / Voice Sounds] : lungs were clear to auscultation bilaterally [Chest Palpation] : palpation of the chest revealed no abnormalities [Lungs Percussion] : the lungs were normal to percussion [Abdomen Soft] : soft [Abdomen Tenderness] : non-tender [Abdomen Mass (___ Cm)] : no abdominal mass palpated [Abnormal Walk] : normal gait [Gait - Sufficient For Exercise Testing] : the gait was sufficient for exercise testing [Nail Clubbing] : no clubbing of the fingernails [Cyanosis, Localized] : no localized cyanosis [Petechial Hemorrhages (___cm)] : no petechial hemorrhages [Skin Color & Pigmentation] : normal skin color and pigmentation [] : no rash [No Venous Stasis] : no venous stasis [Skin Lesions] : no skin lesions [No Skin Ulcers] : no skin ulcer [No Xanthoma] : no  xanthoma was observed [Deep Tendon Reflexes (DTR)] : deep tendon reflexes were 2+ and symmetric [Sensation] : the sensory exam was normal to light touch and pinprick [No Focal Deficits] : no focal deficits [Oriented To Time, Place, And Person] : oriented to person, place, and time [Impaired Insight] : insight and judgment were intact [Affect] : the affect was normal [FreeTextEntry1] : non icteric

## 2021-01-29 NOTE — DISCUSSION/SUMMARY
[FreeTextEntry1] : Typical posterior chest discomfort\par Low clinical suspicion for embolic disease\par No active wheezing on clinical exam\par Complete Medrol\par Follow-up of PFT\par DVT PE bundle pending\par \par LEONARD with significant improvement of CPAP usage- addressed risks /benefits- discussed  benefits re usage\par Mild asthma need daily compliance with use Symbicort and rinse/ Spiuriva and improvement flow rates\par    Hypertension borderline\par Proteinuria with history mild renal insufficiency management PMD\par Heme  noted\par continue singulair 10 mg QD\par Symbicort  and prn Proair as noted\par LEONARD- AUTO CPAP  6 - 18 cm H20\par  Proair before swimming 2 puffs\par  singulair 10  QD with food\par Anemia w/u and noted stool negative heme renal\par ADDED  spiriva 2.5 mcg 2 puffs QD\par \par Recommendation patient to consider bariatric surgery- did  not proceed\par Do believe her weight contributes to her sensation of shortness of breath\par Referral Cyrus Valiente MD- on hold\par \par f/u mammogram per PMD management\par  Colonoscopy- completed with Dr Birch\par f/u RENAl for proteinuria- Med f/u\par \par Patient compliant with CPAP therapy.  Continue present settings.  Patient with demonstrated clinical benefit with daytime and nocturnal symptomatology improvement.\par \par DEXA  Aug 2020 f/u 2 years\par Note has PCP for  overall  management and  will  defer other  medical  issues  to  her  care\par  \par

## 2021-01-29 NOTE — PROCEDURE
[FreeTextEntry1] : Chest x-ray PA lateral January 29, 2021\par Normal cardiac size\par Clear lungs\par No parenchymal infiltrates pleural effusions with dominant pulmonary nodules left well-circumscribed intrapulmonary lymph node\par Tiny nodularity noted right lower lung zone most consistent with granuloma dating back  2 years\par \par PFT Nov 23 2020\par  Mild OAD\par normal lung volumes\par Normal DLCO\par  HGB 11.4 \par Interval improvement at Flow Rates \par \par NIOX 69 PPD October 22, 2020\par PFT October 22, 2020\par Mild reduction flow rates with a mild obstructive ventilatory impairment\par No significant response to bronchodilator at the FEV1 measuring 7%\par Borderline response at the small airways of 15%\par Lung volumes demonstrate normal total lung capacity\par Air trapping with RV/TLC ratio 38% predicted and a decreased ERV likely secondary to patient increased abdominal girth\par Diffusion normal 100% predicted.\par Hemoglobin 11.4\par Data compared to August 11, 2020 does demonstrate some mild reduction at the flow rates\par \par DEXA 9/17/20\par normal study\par \par 8/11/2020\par NIOX 50\par PFT\par  spirometry normal\par 30% response to bronchodilator at the FEV1\par Normal lung volumes\par Diffusion normal 92% predicted.\par Positive bronchodilator response\par \par Chest x-ray PA lateral March 9, 2020\par Normal cardiac size\par Clear lung fields without parenchymal infiltrates pleural effusions dominant pulmonary nodules\par Tissue bony structures normal\par Roxie mediastinum normal\par Impression clear lungs\par \par  CPAP DATA data June 30, 2020\par Usage 93  %\par Hours on average 6\par Order CPAP setting 6-18 cm H2O\par Average AHI 1.4\par \par Flu Vaccine  11/14/2019

## 2021-01-30 LAB
BASOPHILS # BLD AUTO: 0.03 K/UL
BASOPHILS NFR BLD AUTO: 0.4 %
EOSINOPHIL # BLD AUTO: 0.03 K/UL
EOSINOPHIL NFR BLD AUTO: 0.4 %
HCT VFR BLD CALC: 40.9 %
HGB BLD-MCNC: 11.7 G/DL
IMM GRANULOCYTES NFR BLD AUTO: 0.2 %
LYMPHOCYTES # BLD AUTO: 1.64 K/UL
LYMPHOCYTES NFR BLD AUTO: 20.3 %
MAN DIFF?: NORMAL
MCHC RBC-ENTMCNC: 28.6 GM/DL
MCHC RBC-ENTMCNC: 28.6 PG
MCV RBC AUTO: 100 FL
MONOCYTES # BLD AUTO: 0.47 K/UL
MONOCYTES NFR BLD AUTO: 5.8 %
NEUTROPHILS # BLD AUTO: 5.87 K/UL
NEUTROPHILS NFR BLD AUTO: 72.9 %
PLATELET # BLD AUTO: 328 K/UL
RBC # BLD: 4.09 M/UL
RBC # FLD: 12.9 %
WBC # FLD AUTO: 8.06 K/UL

## 2021-01-31 ENCOUNTER — RX RENEWAL (OUTPATIENT)
Age: 56
End: 2021-01-31

## 2021-01-31 LAB
ANION GAP SERPL CALC-SCNC: 16 MMOL/L
BUN SERPL-MCNC: 16 MG/DL
CALCIUM SERPL-MCNC: 10.2 MG/DL
CHLORIDE SERPL-SCNC: 101 MMOL/L
CO2 SERPL-SCNC: 24 MMOL/L
CREAT SERPL-MCNC: 1.09 MG/DL
GLUCOSE SERPL-MCNC: 91 MG/DL
POTASSIUM SERPL-SCNC: 4.4 MMOL/L
SODIUM SERPL-SCNC: 141 MMOL/L

## 2021-02-19 ENCOUNTER — APPOINTMENT (OUTPATIENT)
Dept: DISASTER EMERGENCY | Facility: CLINIC | Age: 56
End: 2021-02-19

## 2021-02-20 LAB — SARS-COV-2 N GENE NPH QL NAA+PROBE: NOT DETECTED

## 2021-02-22 ENCOUNTER — APPOINTMENT (OUTPATIENT)
Dept: PULMONOLOGY | Facility: CLINIC | Age: 56
End: 2021-02-22
Payer: COMMERCIAL

## 2021-02-22 VITALS
OXYGEN SATURATION: 100 % | DIASTOLIC BLOOD PRESSURE: 82 MMHG | TEMPERATURE: 97.5 F | WEIGHT: 225 LBS | HEIGHT: 69 IN | HEART RATE: 73 BPM | BODY MASS INDEX: 33.33 KG/M2 | SYSTOLIC BLOOD PRESSURE: 126 MMHG | RESPIRATION RATE: 16 BRPM

## 2021-02-22 PROCEDURE — 94727 GAS DIL/WSHOT DETER LNG VOL: CPT

## 2021-02-22 PROCEDURE — 94729 DIFFUSING CAPACITY: CPT

## 2021-02-22 PROCEDURE — 88738 HGB QUANT TRANSCUTANEOUS: CPT

## 2021-02-22 PROCEDURE — 99214 OFFICE O/P EST MOD 30 MIN: CPT | Mod: 25

## 2021-02-22 PROCEDURE — 94060 EVALUATION OF WHEEZING: CPT

## 2021-02-22 PROCEDURE — 99072 ADDL SUPL MATRL&STAF TM PHE: CPT

## 2021-02-22 RX ORDER — METHYLPREDNISOLONE 4 MG/1
4 TABLET ORAL
Qty: 1 | Refills: 0 | Status: DISCONTINUED | COMMUNITY
Start: 2021-01-28 | End: 2021-02-22

## 2021-02-22 NOTE — DISCUSSION/SUMMARY
[FreeTextEntry1] : \par \par LEONARD with significant improvement of CPAP usage- addressed risks /benefits- discussed  benefits re usage\par Mild asthma need daily compliance with use Symbicort and rinse/ Spiuriva and improvement flow rates\par    Hypertension borderline\par Proteinuria with history mild renal insufficiency management PMD\par Heme  noted\par Lumbar  back pain with PMFD f/u\par continue singulair 10 mg QD\par Symbicort  and prn Proair as noted\par LEONARD- AUTO CPAP  6 - 18 cm H20\par  Proair before swimming 2 puffs\par \par Anemia w/u and noted stool negative heme renal\par ADDED  spiriva 2.5 mcg 2 puffs QD- HOLD\par \par Recommendation patient to consider bariatric surgery- did  not proceed\par Do believe her weight contributes to her sensation of shortness of breath\par Referral Cyrus Valiente MD- on hold\par \par f/u mammogram per PMD management\par  Colonoscopy- completed with Dr Birch\par f/u RENAl for proteinuria- Med f/u\par \par Patient compliant with CPAP therapy.  Continue present settings.  Patient with demonstrated clinical benefit with daytime and nocturnal symptomatology improvement.\par \par DEXA  Sept 2022\par Note has PCP for  overall  management and  will  defer other  medical  issues  to  her  care\par  \par

## 2021-02-22 NOTE — PROCEDURE
[FreeTextEntry1] : PFT 2/22/21\par Normal  flow rates\par  minimal OAD\par Normal  lung volumes borderline airtrapping\par  Normal DLCO  84 %  pred\par  HGB 11.7\par \par Chest x-ray PA lateral January 29, 2021\par Normal cardiac size\par Clear lungs\par No parenchymal infiltrates pleural effusions with dominant pulmonary nodules left well-circumscribed intrapulmonary lymph node\par Tiny nodularity noted right lower lung zone most consistent with granuloma dating back  2 years\par \par PFT Nov 23 2020\par  Mild OAD\par normal lung volumes\par Normal DLCO\par  HGB 11.4 \par Interval improvement at Flow Rates \par \par NIOX 69 PPD October 22, 2020\par PFT October 22, 2020\par Mild reduction flow rates with a mild obstructive ventilatory impairment\par No significant response to bronchodilator at the FEV1 measuring 7%\par Borderline response at the small airways of 15%\par Lung volumes demonstrate normal total lung capacity\par Air trapping with RV/TLC ratio 38% predicted and a decreased ERV likely secondary to patient increased abdominal girth\par Diffusion normal 100% predicted.\par Hemoglobin 11.4\par Data compared to August 11, 2020 does demonstrate some mild reduction at the flow rates\par \par DEXA 9/17/20\par normal study\par \par 8/11/2020\par NIOX 50\par PFT\par  spirometry normal\par 30% response to bronchodilator at the FEV1\par Normal lung volumes\par Diffusion normal 92% predicted.\par Positive bronchodilator response\par \par Chest x-ray PA lateral March 9, 2020\par Normal cardiac size\par Clear lung fields without parenchymal infiltrates pleural effusions dominant pulmonary nodules\par Tissue bony structures normal\par Roxie mediastinum normal\par Impression clear lungs\par \par  CPAP DATA data @/22/21\par Usage 30  %\par Hours on average 5\par Order CPAP setting 6-18 cm H2O\par Average AHI 2.9\par  noted issue with up to  date  supplies\par \par Flu Vaccine  11/14/2019

## 2021-02-22 NOTE — PHYSICAL EXAM
[General Appearance - Well Developed] : well developed [Normal Appearance] : normal appearance [Well Groomed] : well groomed [General Appearance - Well Nourished] : well nourished [No Deformities] : no deformities [General Appearance - In No Acute Distress] : no acute distress [Normal Conjunctiva] : the conjunctiva exhibited no abnormalities [Eyelids - No Xanthelasma] : the eyelids demonstrated no xanthelasmas [Normal Oropharynx] : normal oropharynx [II] : II [Neck Appearance] : the appearance of the neck was normal [Neck Cervical Mass (___cm)] : no neck mass was observed [Jugular Venous Distention Increased] : there was no jugular-venous distention [Thyroid Diffuse Enlargement] : the thyroid was not enlarged [Heart Rate And Rhythm] : heart rate and rhythm were normal [Heart Sounds] : normal S1 and S2 [Murmurs] : no murmurs present [Arterial Pulses Normal] : the arterial pulses were normal [Edema] : no peripheral edema present [Veins - Varicosity Changes] : no varicosital changes were noted in the lower extremities [Respiration, Rhythm And Depth] : normal respiratory rhythm and effort [Exaggerated Use Of Accessory Muscles For Inspiration] : no accessory muscle use [Auscultation Breath Sounds / Voice Sounds] : lungs were clear to auscultation bilaterally [Chest Palpation] : palpation of the chest revealed no abnormalities [Lungs Percussion] : the lungs were normal to percussion [Abdomen Soft] : soft [Abdomen Tenderness] : non-tender [Abdomen Mass (___ Cm)] : no abdominal mass palpated [Abnormal Walk] : normal gait [Gait - Sufficient For Exercise Testing] : the gait was sufficient for exercise testing [Nail Clubbing] : no clubbing of the fingernails [Cyanosis, Localized] : no localized cyanosis [Petechial Hemorrhages (___cm)] : no petechial hemorrhages [Skin Color & Pigmentation] : normal skin color and pigmentation [] : no rash [No Venous Stasis] : no venous stasis [Skin Lesions] : no skin lesions [No Skin Ulcers] : no skin ulcer [No Xanthoma] : no  xanthoma was observed [Sensation] : the sensory exam was normal to light touch and pinprick [Deep Tendon Reflexes (DTR)] : deep tendon reflexes were 2+ and symmetric [No Focal Deficits] : no focal deficits [Oriented To Time, Place, And Person] : oriented to person, place, and time [Impaired Insight] : insight and judgment were intact [Affect] : the affect was normal [FreeTextEntry1] : non icteric

## 2021-02-22 NOTE — HISTORY OF PRESENT ILLNESS
[Stable] : are stable [None] : ~He/She~ has no significant interval events [Difficulty Breathing During Exertion] : stable dyspnea on exertion [Feelings Of Weakness On Exertion] : stable exercise intolerance [Cough] : denies coughing [Wheezing] : denies wheezing [Regional Soft Tissue Swelling Both Lower Extremities] : denies lower extremity edema [Chest Pain Or Discomfort] : denies chest pain [Fever] : denies fever [Obstructive Sleep Apnea] : obstructive sleep apnea [Date: ___] : Date of most recent diagnostic polysomnogram: [unfilled] [AHI: ___ per hour] : Apnea-hypopnea index:  [unfilled] per hour [Wt Gain ___ Lbs] : no recent weight gain [Wt Loss ___ Lbs] : no recent weight loss [Oxygen] : the patient uses no supplemental oxygen [Nocturnal Oxygen] : The patient does not use nocturnal oxygen [FreeTextEntry1] : no active asthma sxs\par  issue with CPAP use \par does use So Clean but was not compliant \par no viral\par  no travel\par non sp  lumbar back pain\par Notes some component  left hip  pain with numbness  radiation left

## 2021-02-23 ENCOUNTER — APPOINTMENT (OUTPATIENT)
Dept: INTERNAL MEDICINE | Facility: CLINIC | Age: 56
End: 2021-02-23
Payer: COMMERCIAL

## 2021-02-23 VITALS
OXYGEN SATURATION: 94 % | BODY MASS INDEX: 34.07 KG/M2 | HEART RATE: 74 BPM | TEMPERATURE: 99 F | SYSTOLIC BLOOD PRESSURE: 122 MMHG | DIASTOLIC BLOOD PRESSURE: 84 MMHG | WEIGHT: 230 LBS | HEIGHT: 69 IN

## 2021-02-23 PROCEDURE — 99072 ADDL SUPL MATRL&STAF TM PHE: CPT

## 2021-02-23 PROCEDURE — 99213 OFFICE O/P EST LOW 20 MIN: CPT

## 2021-02-23 NOTE — HISTORY OF PRESENT ILLNESS
[de-identified] : Patient here for BP follow up.\par She is compliant with her medications. She has been checking it consistently at home, readings are controlled. \par She has appointment for ECHO with cardio tomorrow.\par She will be seeing Dr Escalante for back pain, requires referral. The back pain is chronic but worsened as of late. Denies bowel/bladder incontinence.

## 2021-02-23 NOTE — PLAN
[FreeTextEntry1] : HTN\par -better controlled today\par -continue olmesartan HCTZ, refill sent\par -cardio follow up

## 2021-02-25 ENCOUNTER — APPOINTMENT (OUTPATIENT)
Dept: ORTHOPEDIC SURGERY | Facility: CLINIC | Age: 56
End: 2021-02-25
Payer: COMMERCIAL

## 2021-02-25 VITALS
DIASTOLIC BLOOD PRESSURE: 87 MMHG | WEIGHT: 230 LBS | HEIGHT: 69 IN | SYSTOLIC BLOOD PRESSURE: 122 MMHG | BODY MASS INDEX: 34.07 KG/M2 | HEART RATE: 80 BPM

## 2021-02-25 DIAGNOSIS — M48.07 SPINAL STENOSIS, LUMBOSACRAL REGION: ICD-10-CM

## 2021-02-25 PROCEDURE — 99214 OFFICE O/P EST MOD 30 MIN: CPT

## 2021-02-25 PROCEDURE — 99072 ADDL SUPL MATRL&STAF TM PHE: CPT

## 2021-02-27 ENCOUNTER — EMERGENCY (EMERGENCY)
Facility: HOSPITAL | Age: 56
LOS: 1 days | Discharge: HOME | End: 2021-02-27
Attending: EMERGENCY MEDICINE | Admitting: EMERGENCY MEDICINE
Payer: COMMERCIAL

## 2021-02-27 VITALS
SYSTOLIC BLOOD PRESSURE: 115 MMHG | RESPIRATION RATE: 18 BRPM | TEMPERATURE: 98 F | DIASTOLIC BLOOD PRESSURE: 68 MMHG | HEART RATE: 68 BPM | OXYGEN SATURATION: 100 %

## 2021-02-27 VITALS
OXYGEN SATURATION: 100 % | RESPIRATION RATE: 18 BRPM | HEART RATE: 78 BPM | TEMPERATURE: 98 F | DIASTOLIC BLOOD PRESSURE: 82 MMHG | SYSTOLIC BLOOD PRESSURE: 138 MMHG

## 2021-02-27 LAB
ALBUMIN SERPL ELPH-MCNC: 4.2 G/DL — SIGNIFICANT CHANGE UP (ref 3.3–5)
ALP SERPL-CCNC: 76 U/L — SIGNIFICANT CHANGE UP (ref 40–120)
ALT FLD-CCNC: 19 U/L — SIGNIFICANT CHANGE UP (ref 4–33)
ANION GAP SERPL CALC-SCNC: 7 MMOL/L — SIGNIFICANT CHANGE UP (ref 7–14)
APPEARANCE UR: CLEAR — SIGNIFICANT CHANGE UP
AST SERPL-CCNC: 20 U/L — SIGNIFICANT CHANGE UP (ref 4–32)
BASOPHILS # BLD AUTO: 0.02 K/UL — SIGNIFICANT CHANGE UP (ref 0–0.2)
BASOPHILS NFR BLD AUTO: 0.4 % — SIGNIFICANT CHANGE UP (ref 0–2)
BILIRUB SERPL-MCNC: 0.3 MG/DL — SIGNIFICANT CHANGE UP (ref 0.2–1.2)
BILIRUB UR-MCNC: NEGATIVE — SIGNIFICANT CHANGE UP
BUN SERPL-MCNC: 13 MG/DL — SIGNIFICANT CHANGE UP (ref 7–23)
CALCIUM SERPL-MCNC: 9.8 MG/DL — SIGNIFICANT CHANGE UP (ref 8.4–10.5)
CHLORIDE SERPL-SCNC: 104 MMOL/L — SIGNIFICANT CHANGE UP (ref 98–107)
CO2 SERPL-SCNC: 29 MMOL/L — SIGNIFICANT CHANGE UP (ref 22–31)
COLOR SPEC: YELLOW — SIGNIFICANT CHANGE UP
CREAT SERPL-MCNC: 0.92 MG/DL — SIGNIFICANT CHANGE UP (ref 0.5–1.3)
DIFF PNL FLD: NEGATIVE — SIGNIFICANT CHANGE UP
EOSINOPHIL # BLD AUTO: 0.18 K/UL — SIGNIFICANT CHANGE UP (ref 0–0.5)
EOSINOPHIL NFR BLD AUTO: 3.9 % — SIGNIFICANT CHANGE UP (ref 0–6)
GLUCOSE SERPL-MCNC: 96 MG/DL — SIGNIFICANT CHANGE UP (ref 70–99)
GLUCOSE UR QL: NEGATIVE — SIGNIFICANT CHANGE UP
HCT VFR BLD CALC: 36.1 % — SIGNIFICANT CHANGE UP (ref 34.5–45)
HGB BLD-MCNC: 11.2 G/DL — LOW (ref 11.5–15.5)
IANC: 2.5 K/UL — SIGNIFICANT CHANGE UP (ref 1.5–8.5)
IMM GRANULOCYTES NFR BLD AUTO: 0.2 % — SIGNIFICANT CHANGE UP (ref 0–1.5)
KETONES UR-MCNC: NEGATIVE — SIGNIFICANT CHANGE UP
LEUKOCYTE ESTERASE UR-ACNC: NEGATIVE — SIGNIFICANT CHANGE UP
LYMPHOCYTES # BLD AUTO: 1.51 K/UL — SIGNIFICANT CHANGE UP (ref 1–3.3)
LYMPHOCYTES # BLD AUTO: 32.8 % — SIGNIFICANT CHANGE UP (ref 13–44)
MCHC RBC-ENTMCNC: 28.8 PG — SIGNIFICANT CHANGE UP (ref 27–34)
MCHC RBC-ENTMCNC: 31 GM/DL — LOW (ref 32–36)
MCV RBC AUTO: 92.8 FL — SIGNIFICANT CHANGE UP (ref 80–100)
MONOCYTES # BLD AUTO: 0.38 K/UL — SIGNIFICANT CHANGE UP (ref 0–0.9)
MONOCYTES NFR BLD AUTO: 8.3 % — SIGNIFICANT CHANGE UP (ref 2–14)
NEUTROPHILS # BLD AUTO: 2.5 K/UL — SIGNIFICANT CHANGE UP (ref 1.8–7.4)
NEUTROPHILS NFR BLD AUTO: 54.4 % — SIGNIFICANT CHANGE UP (ref 43–77)
NITRITE UR-MCNC: NEGATIVE — SIGNIFICANT CHANGE UP
NRBC # BLD: 0 /100 WBCS — SIGNIFICANT CHANGE UP
NRBC # FLD: 0 K/UL — SIGNIFICANT CHANGE UP
PH UR: 6 — SIGNIFICANT CHANGE UP (ref 5–8)
PLATELET # BLD AUTO: 268 K/UL — SIGNIFICANT CHANGE UP (ref 150–400)
POTASSIUM SERPL-MCNC: 4.1 MMOL/L — SIGNIFICANT CHANGE UP (ref 3.5–5.3)
POTASSIUM SERPL-SCNC: 4.1 MMOL/L — SIGNIFICANT CHANGE UP (ref 3.5–5.3)
PROT SERPL-MCNC: 8 G/DL — SIGNIFICANT CHANGE UP (ref 6–8.3)
PROT UR-MCNC: ABNORMAL
RBC # BLD: 3.89 M/UL — SIGNIFICANT CHANGE UP (ref 3.8–5.2)
RBC # FLD: 12.7 % — SIGNIFICANT CHANGE UP (ref 10.3–14.5)
SARS-COV-2 RNA SPEC QL NAA+PROBE: SIGNIFICANT CHANGE UP
SODIUM SERPL-SCNC: 140 MMOL/L — SIGNIFICANT CHANGE UP (ref 135–145)
SP GR SPEC: 1.03 — HIGH (ref 1.01–1.02)
UROBILINOGEN FLD QL: SIGNIFICANT CHANGE UP
WBC # BLD: 4.6 K/UL — SIGNIFICANT CHANGE UP (ref 3.8–10.5)
WBC # FLD AUTO: 4.6 K/UL — SIGNIFICANT CHANGE UP (ref 3.8–10.5)

## 2021-02-27 PROCEDURE — 74177 CT ABD & PELVIS W/CONTRAST: CPT | Mod: 26

## 2021-02-27 PROCEDURE — 99285 EMERGENCY DEPT VISIT HI MDM: CPT

## 2021-02-27 RX ORDER — ACETAMINOPHEN 500 MG
650 TABLET ORAL ONCE
Refills: 0 | Status: COMPLETED | OUTPATIENT
Start: 2021-02-27 | End: 2021-02-27

## 2021-02-27 RX ORDER — LIDOCAINE 4 G/100G
2 CREAM TOPICAL ONCE
Refills: 0 | Status: COMPLETED | OUTPATIENT
Start: 2021-02-27 | End: 2021-02-27

## 2021-02-27 RX ADMIN — Medication 650 MILLIGRAM(S): at 10:19

## 2021-02-27 RX ADMIN — LIDOCAINE 2 PATCH: 4 CREAM TOPICAL at 10:20

## 2021-02-27 NOTE — ED PROVIDER NOTE - PATIENT PORTAL LINK FT
You can access the FollowMyHealth Patient Portal offered by [facility] by registering at the following website: [email or web address]. By joining [facility]’s FollowMyHealth portal, you will also be able to view your health information using other applications (apps) compatible with our system.

## 2021-02-27 NOTE — ED ADULT NURSE NOTE - OBJECTIVE STATEMENT
Pt received to intake spot 9 presents with lower back pain radiating to left flank beginning last Wednesday. pt a&ox3, ambulatory at baseline, denies trauma to area. Pt reports difficulty bending, states " went to the chiropractor to no relief". Pt medicated as per ordered, will continue to monitor.

## 2021-02-27 NOTE — ED PROVIDER NOTE - NS ED ROS FT
CONSTITUTIONAL - No fever, No diaphoresis, No weight change  SKIN - No rash  HEMATOLOGIC - No abnormal bleeding or bruising  EYES - No eye pain, No blurred vision  ENT - No change in hearing, No sore throat, No neck pain, No rhinorrhea, No ear pain  RESPIRATORY - No shortness of breath, No cough  CARDIAC -No chest pain, No palpitations  GI +abdominal pain, No nausea, No vomiting, No diarrhea, +constipation   +urine comes out in burst when on toilet. denies urinary or fecal incontinence.   MUSCULOSKELETAL - No joint pain, No swelling, +back pain  NEUROLOGIC - No numbness, No focal weakness, No headache, No dizziness

## 2021-02-27 NOTE — ED PROVIDER NOTE - PHYSICAL EXAMINATION
CONSTITUTIONAL: Well-developed; well-nourished; in no acute distress.   SKIN: warm, dry  HEAD: Normocephalic; atraumatic.  EYES: no conjunctival injection.   ENT: No nasal discharge; airway clear.  NECK: Supple; non tender.  CARD: S1, S2 normal; no murmurs, gallops, or rubs. Regular rate and rhythm.   RESP: No wheezes, rales or rhonchi. Good air movement bilaterally.   ABD: no guarding, no distention, no rigidity. +TTP in RLQ (not the LLQ where she originally c/o pain).   EXT: Ambulates independently.  No cyanosis or edema. Lower extremity muscle strength 5/5.   NEURO: Alert, oriented, grossly unremarkable. Sensation intact distally. No0 midline spinal tenderness in thoracic, cervical or lumbar regions.   PSYCH: Cooperative, appropriate.

## 2021-02-27 NOTE — ED PROVIDER NOTE - ATTENDING CONTRIBUTION TO CARE
Attending Statement: I have personally seen and examined this patient. I have fully participated in the care of this patient. I have reviewed all pertinent clinical information, including history physical exam, plan and the Resident's note and agree except as noted  54yo F hx of HTN, HLD, CKD, chronic back pain pw pain of the lower left back radiate to the front x 4 days. Feels different than usual back pain. no associated n/v/d. Feels constipated, able to pass flatus and small bm today. no fever/chills. no dysuria  no hematuria. no trauma. no numbness or weakness. hasn't taken pain med. no AC.  Vital signs noted. mmm. no distress. normal S1-S2 No resp distress. able to speak in full and clear sentences. no wheeze, rales or stridor. soft tender at the left lower side and left flank. no guarding. neg straight leg bl. pt ambulatory.  no rash no ecchymosis  plan labs, urine, ct abdomen, pain med< IVF

## 2021-02-27 NOTE — ED PROVIDER NOTE - CLINICAL SUMMARY MEDICAL DECISION MAKING FREE TEXT BOX
O'Chinyere DO PGY-1: pt presents c/o of sx of renal colic and back pain. No saddle anesthesia. Motor strength 5/5 distally w/ intact sensation. R/o nephrolithiasis / UTI. Ordered cbc, cmp, analgesia, covid swab. After results of CMP and UA will order imaging pending Cr. Will reassess.

## 2021-02-27 NOTE — ED PROVIDER NOTE - PROGRESS NOTE DETAILS
OKarly DO PGY-1: explained to patient the negative findings of the CT. Discussed return precautions. Pt will follow up w/ her PCP.

## 2021-02-27 NOTE — ED ADULT TRIAGE NOTE - CHIEF COMPLAINT QUOTE
Pt c/o lower back pain for the past 3 weeks, states she had an MRI scheduled that was moved to a later date. Pt states she now developed lower abd pain as well, c/o constipation, and states her urinary stream is intermittent since the onset of pain. Denies injury or heavy lifting.

## 2021-02-27 NOTE — ED PROVIDER NOTE - NSFOLLOWUPINSTRUCTIONS_ED_ALL_ED_FT
(1) Follow up with your primary care physician as discussed. In addition, we did not find evidence of a life threatening illness on your testing here today  (2) Immediately seek care at your nearest emergency room if your symptoms worsen, persist, or do not resolve   (3) Take Tylenol as needed for pain per the dosing instructions on the bottle.      Back Pain    WHAT YOU NEED TO KNOW:    Back pain is common. You may have back pain and muscle spasms. You may feel sore or stiff on one or both sides of your back. The pain may spread to your lower body. Conditions that affect the spine, joints, or muscles can cause back pain. These may include arthritis, spinal stenosis (narrowing of the spinal column), muscle tension, or breakdown of the spinal discs.    DISCHARGE INSTRUCTIONS:    Call your local emergency number (911 in the [place]) if:   •You have severe back pain with chest pain.  •You cannot control your urine or bowel movements.  •Your pain becomes so severe that you cannot walk.    Return to the emergency department if:   •You have pain, numbness, or weakness in one or both legs.  •You have severe back pain, nausea, and vomiting.  •You have severe back pain that spreads to your side or genital area.    Call your doctor if:   •You have back pain that does not get better with rest and pain medicine.  •You have a fever.  •You have pain that worsens when you are on your back or when you rest.  •You have pain that worsens when you cough or sneeze.  •You lose weight without trying.  •You have questions or concerns about your condition or care.

## 2021-02-27 NOTE — ED PROVIDER NOTE - OBJECTIVE STATEMENT
56 y/o F w/ pmhx of "kidney failure now resolved", HTN and anemia presents c/o of low back pain (b/l lumbar region) as well as LLQ pain for one week and inability to lay prone. Denies dysuria but admits to "burning" in her LLQ. States bumps in the car ride over made the pain worse. Also admits to constipation for the past 3 days and states her urine comes out in "bursts" instead of a steady stream. Had a hysterectomy 10 years ago. Denies any hx of kidney stones. Denies any IV drug use. Denies any trauma or heavy lifting recently. Denies fevers, chills, nausea, vomiting, chest pain, sob, diarrhea. Denies saddle anesthesia.

## 2021-02-28 LAB
CULTURE RESULTS: SIGNIFICANT CHANGE UP
SPECIMEN SOURCE: SIGNIFICANT CHANGE UP

## 2021-03-01 ENCOUNTER — NON-APPOINTMENT (OUTPATIENT)
Age: 56
End: 2021-03-01

## 2021-03-05 ENCOUNTER — OUTPATIENT (OUTPATIENT)
Dept: OUTPATIENT SERVICES | Facility: HOSPITAL | Age: 56
LOS: 1 days | End: 2021-03-05
Payer: COMMERCIAL

## 2021-03-05 ENCOUNTER — APPOINTMENT (OUTPATIENT)
Dept: MRI IMAGING | Facility: CLINIC | Age: 56
End: 2021-03-05
Payer: COMMERCIAL

## 2021-03-05 ENCOUNTER — RESULT REVIEW (OUTPATIENT)
Age: 56
End: 2021-03-05

## 2021-03-05 DIAGNOSIS — Z00.8 ENCOUNTER FOR OTHER GENERAL EXAMINATION: ICD-10-CM

## 2021-03-05 PROCEDURE — 72148 MRI LUMBAR SPINE W/O DYE: CPT | Mod: 26

## 2021-03-05 PROCEDURE — 72148 MRI LUMBAR SPINE W/O DYE: CPT

## 2021-03-06 ENCOUNTER — RX RENEWAL (OUTPATIENT)
Age: 56
End: 2021-03-06

## 2021-03-10 ENCOUNTER — APPOINTMENT (OUTPATIENT)
Dept: CARDIOLOGY | Facility: CLINIC | Age: 56
End: 2021-03-10

## 2021-03-19 ENCOUNTER — APPOINTMENT (OUTPATIENT)
Dept: ORTHOPEDIC SURGERY | Facility: CLINIC | Age: 56
End: 2021-03-19
Payer: COMMERCIAL

## 2021-03-19 VITALS
HEART RATE: 83 BPM | BODY MASS INDEX: 34.07 KG/M2 | DIASTOLIC BLOOD PRESSURE: 82 MMHG | SYSTOLIC BLOOD PRESSURE: 122 MMHG | HEIGHT: 69 IN | WEIGHT: 230 LBS

## 2021-03-19 DIAGNOSIS — M54.5 LOW BACK PAIN: ICD-10-CM

## 2021-03-19 PROCEDURE — 99214 OFFICE O/P EST MOD 30 MIN: CPT

## 2021-03-19 PROCEDURE — 99072 ADDL SUPL MATRL&STAF TM PHE: CPT

## 2021-03-31 ENCOUNTER — NON-APPOINTMENT (OUTPATIENT)
Age: 56
End: 2021-03-31

## 2021-04-01 ENCOUNTER — OUTPATIENT (OUTPATIENT)
Dept: OUTPATIENT SERVICES | Facility: HOSPITAL | Age: 56
LOS: 1 days | End: 2021-04-01
Payer: COMMERCIAL

## 2021-04-01 ENCOUNTER — APPOINTMENT (OUTPATIENT)
Dept: MAMMOGRAPHY | Facility: IMAGING CENTER | Age: 56
End: 2021-04-01
Payer: COMMERCIAL

## 2021-04-01 ENCOUNTER — APPOINTMENT (OUTPATIENT)
Dept: ULTRASOUND IMAGING | Facility: IMAGING CENTER | Age: 56
End: 2021-04-01
Payer: COMMERCIAL

## 2021-04-01 ENCOUNTER — APPOINTMENT (OUTPATIENT)
Dept: CARDIOLOGY | Facility: CLINIC | Age: 56
End: 2021-04-01
Payer: COMMERCIAL

## 2021-04-01 ENCOUNTER — NON-APPOINTMENT (OUTPATIENT)
Age: 56
End: 2021-04-01

## 2021-04-01 VITALS
WEIGHT: 230 LBS | TEMPERATURE: 98.1 F | DIASTOLIC BLOOD PRESSURE: 80 MMHG | OXYGEN SATURATION: 99 % | HEIGHT: 69 IN | HEART RATE: 75 BPM | SYSTOLIC BLOOD PRESSURE: 125 MMHG | BODY MASS INDEX: 34.07 KG/M2

## 2021-04-01 DIAGNOSIS — Z00.8 ENCOUNTER FOR OTHER GENERAL EXAMINATION: ICD-10-CM

## 2021-04-01 PROCEDURE — 76641 ULTRASOUND BREAST COMPLETE: CPT | Mod: 26,50

## 2021-04-01 PROCEDURE — 77063 BREAST TOMOSYNTHESIS BI: CPT | Mod: 26

## 2021-04-01 PROCEDURE — 77067 SCR MAMMO BI INCL CAD: CPT

## 2021-04-01 PROCEDURE — 77067 SCR MAMMO BI INCL CAD: CPT | Mod: 26

## 2021-04-01 PROCEDURE — 99214 OFFICE O/P EST MOD 30 MIN: CPT

## 2021-04-01 PROCEDURE — 77063 BREAST TOMOSYNTHESIS BI: CPT

## 2021-04-01 PROCEDURE — 76641 ULTRASOUND BREAST COMPLETE: CPT

## 2021-04-01 PROCEDURE — 99072 ADDL SUPL MATRL&STAF TM PHE: CPT

## 2021-04-05 ENCOUNTER — APPOINTMENT (OUTPATIENT)
Dept: PULMONOLOGY | Facility: CLINIC | Age: 56
End: 2021-04-05
Payer: COMMERCIAL

## 2021-04-05 VITALS
HEART RATE: 82 BPM | OXYGEN SATURATION: 100 % | SYSTOLIC BLOOD PRESSURE: 114 MMHG | DIASTOLIC BLOOD PRESSURE: 80 MMHG | RESPIRATION RATE: 16 BRPM | TEMPERATURE: 97.7 F

## 2021-04-05 PROCEDURE — 99072 ADDL SUPL MATRL&STAF TM PHE: CPT

## 2021-04-05 PROCEDURE — 99214 OFFICE O/P EST MOD 30 MIN: CPT

## 2021-04-05 RX ORDER — TIOTROPIUM BROMIDE INHALATION SPRAY 3.12 UG/1
2.5 SPRAY, METERED RESPIRATORY (INHALATION)
Qty: 1 | Refills: 3 | Status: DISCONTINUED | COMMUNITY
Start: 2020-11-23 | End: 2021-04-05

## 2021-04-05 NOTE — PROCEDURE
[FreeTextEntry1] : PFT 2/22/21\par Normal  flow rates\par  minimal OAD\par Normal  lung volumes borderline airtrapping\par  Normal DLCO  84 %  pred\par  HGB 11.7\par \par Chest x-ray PA lateral January 29, 2021\par Normal cardiac size\par Clear lungs\par No parenchymal infiltrates pleural effusions with dominant pulmonary nodules left well-circumscribed intrapulmonary lymph node\par Tiny nodularity noted right lower lung zone most consistent with granuloma dating back  2 years\par \par PFT Nov 23 2020\par  Mild OAD\par normal lung volumes\par Normal DLCO\par  HGB 11.4 \par Interval improvement at Flow Rates \par \par NIOX 69 PPD October 22, 2020\par PFT October 22, 2020\par Mild reduction flow rates with a mild obstructive ventilatory impairment\par No significant response to bronchodilator at the FEV1 measuring 7%\par Borderline response at the small airways of 15%\par Lung volumes demonstrate normal total lung capacity\par Air trapping with RV/TLC ratio 38% predicted and a decreased ERV likely secondary to patient increased abdominal girth\par Diffusion normal 100% predicted.\par Hemoglobin 11.4\par Data compared to August 11, 2020 does demonstrate some mild reduction at the flow rates\par \par DEXA 9/17/20\par normal study\par \par 8/11/2020\par NIOX 50\par PFT\par  spirometry normal\par 30% response to bronchodilator at the FEV1\par Normal lung volumes\par Diffusion normal 92% predicted.\par Positive bronchodilator response\par \par Chest x-ray PA lateral March 9, 2020\par Normal cardiac size\par Clear lung fields without parenchymal infiltrates pleural effusions dominant pulmonary nodules\par Tissue bony structures normal\par Roxie mediastinum normal\par Impression clear lungs\par \par  CPAP DATA data  4/5/21\par usage 90 %\par Hopurs >6\par Auto CPAp 6 - 16 cm h20\par AHI 2.9\par \par Flu Vaccine  11/14/2019\par PFIZER completed\par \par f/u with Resp Therapy - CPAP initiation with  mask refit

## 2021-04-05 NOTE — PHYSICAL EXAM
[General Appearance - Well Developed] : well developed [Normal Appearance] : normal appearance [Well Groomed] : well groomed [General Appearance - Well Nourished] : well nourished [No Deformities] : no deformities [General Appearance - In No Acute Distress] : no acute distress [Normal Conjunctiva] : the conjunctiva exhibited no abnormalities [Eyelids - No Xanthelasma] : the eyelids demonstrated no xanthelasmas [FreeTextEntry1] : non icteric [Normal Oropharynx] : normal oropharynx [II] : II [Neck Appearance] : the appearance of the neck was normal [Neck Cervical Mass (___cm)] : no neck mass was observed [Jugular Venous Distention Increased] : there was no jugular-venous distention [Thyroid Diffuse Enlargement] : the thyroid was not enlarged [Heart Rate And Rhythm] : heart rate and rhythm were normal [Heart Sounds] : normal S1 and S2 [Murmurs] : no murmurs present [Arterial Pulses Normal] : the arterial pulses were normal [Edema] : no peripheral edema present [Veins - Varicosity Changes] : no varicosital changes were noted in the lower extremities [Respiration, Rhythm And Depth] : normal respiratory rhythm and effort [Exaggerated Use Of Accessory Muscles For Inspiration] : no accessory muscle use [Auscultation Breath Sounds / Voice Sounds] : lungs were clear to auscultation bilaterally [Chest Palpation] : palpation of the chest revealed no abnormalities [Lungs Percussion] : the lungs were normal to percussion [Abdomen Soft] : soft [Abdomen Tenderness] : non-tender [Abdomen Mass (___ Cm)] : no abdominal mass palpated [Abnormal Walk] : normal gait [Gait - Sufficient For Exercise Testing] : the gait was sufficient for exercise testing [Nail Clubbing] : no clubbing of the fingernails [Cyanosis, Localized] : no localized cyanosis [Petechial Hemorrhages (___cm)] : no petechial hemorrhages [Skin Color & Pigmentation] : normal skin color and pigmentation [] : no rash [No Venous Stasis] : no venous stasis [Skin Lesions] : no skin lesions [No Skin Ulcers] : no skin ulcer [No Xanthoma] : no  xanthoma was observed [Deep Tendon Reflexes (DTR)] : deep tendon reflexes were 2+ and symmetric [Sensation] : the sensory exam was normal to light touch and pinprick [No Focal Deficits] : no focal deficits [Oriented To Time, Place, And Person] : oriented to person, place, and time [Impaired Insight] : insight and judgment were intact [Affect] : the affect was normal

## 2021-04-05 NOTE — REVIEW OF SYSTEMS
[Ear Disturbance] : no ear disturbance [Nasal Congestion] : no nasal congestion [Epistaxis] : no nosebleeds [Postnasal Drip] : no postnasal drip [Sinus Problems] : sinus problems [Sore Throat] : no sore throat [Dry Mouth] : no dry mouth [As Noted in HPI] : as noted in HPI [Hypertension] : ~T hypertension [PND] : no PND [Orthopnea] : no orthopnea [Palpitations] : no palpitations [Edema] : ~T edema was not present [Claudication] : no intermittent claudication [Leg Cramps] : no leg cramps [Back Pain] : ~T back pain [Anemia] : anemia [Negative] : Pulmonary Hypertension [FreeTextEntry5] : colonoscopy 2016 [FreeTextEntry6] : management Dr Abraham Escalante

## 2021-04-05 NOTE — HISTORY OF PRESENT ILLNESS
[Stable] : are stable [None] : ~He/She~ has no significant interval events [Difficulty Breathing During Exertion] : stable dyspnea on exertion [Feelings Of Weakness On Exertion] : stable exercise intolerance [Cough] : denies coughing [Wheezing] : denies wheezing [Regional Soft Tissue Swelling Both Lower Extremities] : denies lower extremity edema [Chest Pain Or Discomfort] : denies chest pain [Fever] : denies fever [Wt Gain ___ Lbs] : no recent weight gain [Wt Loss ___ Lbs] : no recent weight loss [Oxygen] : the patient uses no supplemental oxygen [Obstructive Sleep Apnea] : obstructive sleep apnea [Date: ___] : Date of most recent diagnostic polysomnogram: [unfilled] [AHI: ___ per hour] : Apnea-hypopnea index:  [unfilled] per hour [Nocturnal Oxygen] : The patient does not use nocturnal oxygen [FreeTextEntry1] : no active asthma sxs\par  issue with CPAP use \par does use So Clean but was not compliant \par no viral\par  no travel\par non sp  lumbar back pain\par Notes some component  left hip  pain with numbness  radiation left

## 2021-04-05 NOTE — DISCUSSION/SUMMARY
[FreeTextEntry1] : \par LEONARD with significant improvement of CPAP usage- addressed risks /benefits- discussed  benefits re usage\par Mild asthma need daily compliance with use Symbicort and rinse/\par Off Spiriva\par    Hypertension borderline\par Proteinuria with history mild renal insufficiency management PMD\par Heme  noted\par Lumbar  back pain with PMFD f/u\par continue singulair 10 mg QD\par Symbicort  and prn Proair as noted\par LEONARD- AUTO CPAP  6 - 18 cm H20\par  Proair before swimming 2 puffs\par \par Anemia w/u and noted stool negative heme renal\par ADDED  spiriva 2.5 mcg 2 puffs QD- HOLD\par \par Recommendation patient to consider bariatric surgery- did  not proceed\par Do believe her weight contributes to her sensation of shortness of breath\par Referral Cyrus Valiente MD- on hold\par \par f/u mammogram per PMD management\par  Colonoscopy- completed with Dr Birch\par f/u RENAl for proteinuria- Med f/u\par cardiology ECHO pending\par \par Patient compliant with CPAP therapy.  Continue present settings.  Patient with demonstrated clinical benefit with daytime and nocturnal symptomatology improvement.\par \par DEXA  Sept 2022\par Note has PCP for  overall  management and  will  defer other  medical  issues  to  her  care\par  \par

## 2021-04-09 LAB
ALBUMIN SERPL ELPH-MCNC: 4.5 G/DL
ALP BLD-CCNC: 85 U/L
ALT SERPL-CCNC: 15 U/L
ANION GAP SERPL CALC-SCNC: 9 MMOL/L
AST SERPL-CCNC: 18 U/L
BASOPHILS # BLD AUTO: 0.03 K/UL
BASOPHILS NFR BLD AUTO: 0.6 %
BILIRUB DIRECT SERPL-MCNC: 0.1 MG/DL
BILIRUB INDIRECT SERPL-MCNC: 0.1 MG/DL
BILIRUB SERPL-MCNC: 0.2 MG/DL
BUN SERPL-MCNC: 12 MG/DL
CALCIUM SERPL-MCNC: 10.1 MG/DL
CHLORIDE SERPL-SCNC: 105 MMOL/L
CHOLEST SERPL-MCNC: 173 MG/DL
CK SERPL-CCNC: 145 U/L
CO2 SERPL-SCNC: 28 MMOL/L
CREAT SERPL-MCNC: 0.94 MG/DL
EOSINOPHIL # BLD AUTO: 0.2 K/UL
EOSINOPHIL NFR BLD AUTO: 3.8 %
ESTIMATED AVERAGE GLUCOSE: 105 MG/DL
GLUCOSE SERPL-MCNC: 92 MG/DL
HBA1C MFR BLD HPLC: 5.3 %
HCT VFR BLD CALC: 35.9 %
HDLC SERPL-MCNC: 70 MG/DL
HGB BLD-MCNC: 10.9 G/DL
IMM GRANULOCYTES NFR BLD AUTO: 0.2 %
LDLC SERPL CALC-MCNC: 90 MG/DL
LDLC SERPL DIRECT ASSAY-MCNC: 92 MG/DL
LYMPHOCYTES # BLD AUTO: 2.24 K/UL
LYMPHOCYTES NFR BLD AUTO: 42.7 %
MAN DIFF?: NORMAL
MCHC RBC-ENTMCNC: 28.2 PG
MCHC RBC-ENTMCNC: 30.4 GM/DL
MCV RBC AUTO: 93 FL
MONOCYTES # BLD AUTO: 0.51 K/UL
MONOCYTES NFR BLD AUTO: 9.7 %
NEUTROPHILS # BLD AUTO: 2.26 K/UL
NEUTROPHILS NFR BLD AUTO: 43 %
NONHDLC SERPL-MCNC: 103 MG/DL
PLATELET # BLD AUTO: 291 K/UL
POTASSIUM SERPL-SCNC: 4.5 MMOL/L
PROT SERPL-MCNC: 7.6 G/DL
RBC # BLD: 3.86 M/UL
RBC # FLD: 12.4 %
SODIUM SERPL-SCNC: 142 MMOL/L
TRIGL SERPL-MCNC: 65 MG/DL
WBC # FLD AUTO: 5.25 K/UL

## 2021-04-11 ENCOUNTER — RX RENEWAL (OUTPATIENT)
Age: 56
End: 2021-04-11

## 2021-04-11 NOTE — PHYSICAL EXAM
[General Appearance - Well Developed] : well developed [Normal Appearance] : normal appearance [Well Groomed] : well groomed [General Appearance - Well Nourished] : well nourished [No Deformities] : no deformities [General Appearance - In No Acute Distress] : no acute distress [Normal Conjunctiva] : the conjunctiva exhibited no abnormalities [Eyelids - No Xanthelasma] : the eyelids demonstrated no xanthelasmas [Normal Oral Mucosa] : normal oral mucosa [No Oral Pallor] : no oral pallor [No Oral Cyanosis] : no oral cyanosis [Normal Jugular Venous A Waves Present] : normal jugular venous A waves present [Normal Jugular Venous V Waves Present] : normal jugular venous V waves present [No Jugular Venous Vazquez A Waves] : no jugular venous vazquez A waves [Respiration, Rhythm And Depth] : normal respiratory rhythm and effort [Exaggerated Use Of Accessory Muscles For Inspiration] : no accessory muscle use [Auscultation Breath Sounds / Voice Sounds] : lungs were clear to auscultation bilaterally [Heart Rate And Rhythm] : heart rate and rhythm were normal [Heart Sounds] : normal S1 and S2 [Murmurs] : no murmurs present [Abdomen Soft] : soft [Abdomen Tenderness] : non-tender [Abdomen Mass (___ Cm)] : no abdominal mass palpated [Abnormal Walk] : normal gait [Gait - Sufficient For Exercise Testing] : the gait was sufficient for exercise testing [Nail Clubbing] : no clubbing of the fingernails [Cyanosis, Localized] : no localized cyanosis [Petechial Hemorrhages (___cm)] : no petechial hemorrhages [Skin Color & Pigmentation] : normal skin color and pigmentation [] : no rash [No Venous Stasis] : no venous stasis [Skin Lesions] : no skin lesions [No Skin Ulcers] : no skin ulcer [No Xanthoma] : no  xanthoma was observed [Oriented To Time, Place, And Person] : oriented to person, place, and time [Affect] : the affect was normal [Mood] : the mood was normal [No Anxiety] : not feeling anxious [FreeTextEntry1] : Regular rate and rhythm, NL S1, S2, non-displaced PMI, chest non-tender; no rubs,heaves  or gallops a  Grade 2/6 systolic murmur noted at the LSB

## 2021-04-14 ENCOUNTER — APPOINTMENT (OUTPATIENT)
Dept: CARDIOLOGY | Facility: CLINIC | Age: 56
End: 2021-04-14
Payer: COMMERCIAL

## 2021-04-14 PROCEDURE — 99072 ADDL SUPL MATRL&STAF TM PHE: CPT

## 2021-04-14 PROCEDURE — 93306 TTE W/DOPPLER COMPLETE: CPT

## 2021-04-21 ENCOUNTER — OUTPATIENT (OUTPATIENT)
Dept: OUTPATIENT SERVICES | Facility: HOSPITAL | Age: 56
LOS: 1 days | Discharge: HOME | End: 2021-04-21

## 2021-04-21 DIAGNOSIS — D64.9 ANEMIA, UNSPECIFIED: ICD-10-CM

## 2021-04-22 ENCOUNTER — NON-APPOINTMENT (OUTPATIENT)
Age: 56
End: 2021-04-22

## 2021-04-22 ENCOUNTER — APPOINTMENT (OUTPATIENT)
Dept: HEMATOLOGY ONCOLOGY | Facility: CLINIC | Age: 56
End: 2021-04-22
Payer: COMMERCIAL

## 2021-04-22 ENCOUNTER — RESULT REVIEW (OUTPATIENT)
Age: 56
End: 2021-04-22

## 2021-04-22 VITALS
TEMPERATURE: 97.3 F | DIASTOLIC BLOOD PRESSURE: 81 MMHG | HEIGHT: 69.65 IN | OXYGEN SATURATION: 99 % | RESPIRATION RATE: 16 BRPM | SYSTOLIC BLOOD PRESSURE: 123 MMHG | BODY MASS INDEX: 33.99 KG/M2 | WEIGHT: 234.79 LBS | HEART RATE: 91 BPM

## 2021-04-22 LAB
ALBUMIN SERPL ELPH-MCNC: 4.5 G/DL
ALP BLD-CCNC: 85 U/L
ALT SERPL-CCNC: 21 U/L
ANION GAP SERPL CALC-SCNC: 12 MMOL/L
AST SERPL-CCNC: 20 U/L
BASOPHILS # BLD AUTO: 0.03 K/UL — SIGNIFICANT CHANGE UP (ref 0–0.2)
BASOPHILS NFR BLD AUTO: 0.6 % — SIGNIFICANT CHANGE UP (ref 0–2)
BILIRUB SERPL-MCNC: 0.4 MG/DL
BUN SERPL-MCNC: 13 MG/DL
CALCIUM SERPL-MCNC: 10.6 MG/DL
CHLORIDE SERPL-SCNC: 102 MMOL/L
CO2 SERPL-SCNC: 28 MMOL/L
CREAT SERPL-MCNC: 0.98 MG/DL
EOSINOPHIL # BLD AUTO: 0.11 K/UL — SIGNIFICANT CHANGE UP (ref 0–0.5)
EOSINOPHIL NFR BLD AUTO: 2.2 % — SIGNIFICANT CHANGE UP (ref 0–6)
FERRITIN SERPL-MCNC: 272 NG/ML
FOLATE SERPL-MCNC: 15.8 NG/ML
GLUCOSE SERPL-MCNC: 114 MG/DL
HCT VFR BLD CALC: 37.6 % — SIGNIFICANT CHANGE UP (ref 34.5–45)
HGB BLD-MCNC: 11.9 G/DL — SIGNIFICANT CHANGE UP (ref 11.5–15.5)
IMM GRANULOCYTES NFR BLD AUTO: 0.4 % — SIGNIFICANT CHANGE UP (ref 0–1.5)
IRON SATN MFR SERPL: 28 %
IRON SERPL-MCNC: 85 UG/DL
LDH SERPL-CCNC: 199 U/L
LYMPHOCYTES # BLD AUTO: 1.47 K/UL — SIGNIFICANT CHANGE UP (ref 1–3.3)
LYMPHOCYTES # BLD AUTO: 29.8 % — SIGNIFICANT CHANGE UP (ref 13–44)
MCHC RBC-ENTMCNC: 29.3 PG — SIGNIFICANT CHANGE UP (ref 27–34)
MCHC RBC-ENTMCNC: 31.6 G/DL — LOW (ref 32–36)
MCV RBC AUTO: 92.6 FL — SIGNIFICANT CHANGE UP (ref 80–100)
MONOCYTES # BLD AUTO: 0.36 K/UL — SIGNIFICANT CHANGE UP (ref 0–0.9)
MONOCYTES NFR BLD AUTO: 7.3 % — SIGNIFICANT CHANGE UP (ref 2–14)
NEUTROPHILS # BLD AUTO: 2.94 K/UL — SIGNIFICANT CHANGE UP (ref 1.8–7.4)
NEUTROPHILS NFR BLD AUTO: 59.7 % — SIGNIFICANT CHANGE UP (ref 43–77)
NRBC # BLD: 0 /100 WBCS — SIGNIFICANT CHANGE UP (ref 0–0)
PLATELET # BLD AUTO: 258 K/UL — SIGNIFICANT CHANGE UP (ref 150–400)
POTASSIUM SERPL-SCNC: 4.5 MMOL/L
PROT SERPL-MCNC: 7.7 G/DL
RBC # BLD: 4.06 M/UL — SIGNIFICANT CHANGE UP (ref 3.8–5.2)
RBC # FLD: 12.5 % — SIGNIFICANT CHANGE UP (ref 10.3–14.5)
SODIUM SERPL-SCNC: 142 MMOL/L
TIBC SERPL-MCNC: 310 UG/DL
UIBC SERPL-MCNC: 224 UG/DL
VIT B12 SERPL-MCNC: 471 PG/ML
WBC # BLD: 4.93 K/UL — SIGNIFICANT CHANGE UP (ref 3.8–10.5)
WBC # FLD AUTO: 4.93 K/UL — SIGNIFICANT CHANGE UP (ref 3.8–10.5)

## 2021-04-22 PROCEDURE — 99214 OFFICE O/P EST MOD 30 MIN: CPT

## 2021-04-22 PROCEDURE — 99072 ADDL SUPL MATRL&STAF TM PHE: CPT

## 2021-04-22 NOTE — REVIEW OF SYSTEMS
[Joint Pain] : joint pain [Negative] : Constitutional [Fatigue] : no fatigue [SOB on Exertion] : no shortness of breath during exertion [FreeTextEntry9] : low back pain pain radiates to the right thigh, pain in the right elbow.

## 2021-04-22 NOTE — HISTORY OF PRESENT ILLNESS
[0 - No Distress] : Distress Level: 0 [de-identified] : 54 yo F with PMH of asthma, morbid obesity,  sleep apnea on CPAP, HTN, proteinuria. Patient was referred for evaluation and management of anemia. \par \par Laboratory studies from November, 2018 c/w folate 13, B 12 355.\par Iron studies ferritin 225, iron 127, TIBC 294, % saturation iron 43%.\par Creatine 0.78. \par \par Patient has history of iron deficiency anemia secondary to menorrhagia; s/p hysterectomy due to uterine fibroids.\par \par Patient has been followed by Dr Bruno Breen for history of renal insufficiency in 2005,  and proteinuria which has been stable. Patient developed BIJAN and proteinuria  in the setting of NSAID use and streptococcal infection. Pt. with acute post streptococcal GN in 2005.\par \par Patient denies feeling dizzy, lightheadedness, palpitations, recently diagnosed with asthma within the last year complaints of sob with exertion. 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 \par \par  [de-identified] : 6/24/19 iron studies were normal\par Ferritin 230\par Vitamin B 12 429\par Folate 16\par Light chain kappa 2.89 mildly elevated but kappa/ lambda ratio were normal. \par Immunofixation was negative for monoclonal band. \par \par Back pain, been evaluated by chiropractor and pain management. \par \par No other changes in medical, surgical or social history since 1/17/2020. \par \par \par \par \par

## 2021-04-22 NOTE — ASSESSMENT
[FreeTextEntry1] : 53yo F with PMH of asthma, morbid obesity,  sleep apnea on CPAP, HTN, proteinuria. Patient was referred for evaluation and management of anemia. \par \par Laboratory studies CBC 6/24/19 : WBC 4.4, Hb 11.7, RBC 3.87, MCV 92.5, Hct 35.9%, RDW 11.9%, PLTs 257.  \par \par 6/24/19 iron studies were normal\par Ferritin 230\par Vitamin B 12 429\par Folate 16\par Light chain kappa 2.89 mildly elevated but kappa/ lambda ratio were normal. \par Immunofixation was negative for monoclonal band. \par \par Anemia work up was done today. Hb 11.9 g/dl, Hct 37.6%\par \par -Back pain, been evaluated by chiropractor and pain management. \par \par - Sleep apnea: had an echo, stress test and EKG done, following with pulmonary. \par \par - Obesity : trying to loss weight with weight training, yoga and diet modifications. \par \par -HTN: on Olmesartan. \par \par \par RTC  6 months.

## 2021-05-08 ENCOUNTER — APPOINTMENT (OUTPATIENT)
Dept: DISASTER EMERGENCY | Facility: CLINIC | Age: 56
End: 2021-05-08

## 2021-05-09 LAB — SARS-COV-2 N GENE NPH QL NAA+PROBE: NOT DETECTED

## 2021-05-12 ENCOUNTER — APPOINTMENT (OUTPATIENT)
Dept: PULMONOLOGY | Facility: CLINIC | Age: 56
End: 2021-05-12
Payer: COMMERCIAL

## 2021-05-12 VITALS
HEART RATE: 71 BPM | SYSTOLIC BLOOD PRESSURE: 127 MMHG | TEMPERATURE: 97.7 F | DIASTOLIC BLOOD PRESSURE: 77 MMHG | OXYGEN SATURATION: 98 %

## 2021-05-12 PROCEDURE — 99072 ADDL SUPL MATRL&STAF TM PHE: CPT

## 2021-05-12 PROCEDURE — 94729 DIFFUSING CAPACITY: CPT

## 2021-05-12 PROCEDURE — 94010 BREATHING CAPACITY TEST: CPT

## 2021-05-12 PROCEDURE — 88738 HGB QUANT TRANSCUTANEOUS: CPT

## 2021-05-12 PROCEDURE — 94727 GAS DIL/WSHOT DETER LNG VOL: CPT

## 2021-05-12 PROCEDURE — 99214 OFFICE O/P EST MOD 30 MIN: CPT | Mod: 25

## 2021-05-12 NOTE — PROCEDURE
[FreeTextEntry1] : PFT 5/12/21\par mILD oad\par lUNG vOLUMES NL\par DLCO 80 % wnl\par hgb 11.9\par \par PFT 2/22/21\par Normal  flow rates\par  minimal OAD\par Normal  lung volumes borderline airtrapping\par  Normal DLCO  84 %  pred\par  HGB 11.7\par \par Chest x-ray PA lateral January 29, 2021\par Normal cardiac size\par Clear lungs\par No parenchymal infiltrates pleural effusions with dominant pulmonary nodules left well-circumscribed intrapulmonary lymph node\par Tiny nodularity noted right lower lung zone most consistent with granuloma dating back  2 years\par \par PFT Nov 23 2020\par  Mild OAD\par normal lung volumes\par Normal DLCO\par  HGB 11.4 \par Interval improvement at Flow Rates \par \par NIOX 69 PPD October 22, 2020\par PFT October 22, 2020\par Mild reduction flow rates with a mild obstructive ventilatory impairment\par No significant response to bronchodilator at the FEV1 measuring 7%\par Borderline response at the small airways of 15%\par Lung volumes demonstrate normal total lung capacity\par Air trapping with RV/TLC ratio 38% predicted and a decreased ERV likely secondary to patient increased abdominal girth\par Diffusion normal 100% predicted.\par Hemoglobin 11.4\par Data compared to August 11, 2020 does demonstrate some mild reduction at the flow rates\par \par DEXA 9/17/20\par normal study\par \par 8/11/2020\par NIOX 50\par PFT\par  spirometry normal\par 30% response to bronchodilator at the FEV1\par Normal lung volumes\par Diffusion normal 92% predicted.\par Positive bronchodilator response\par \par Chest x-ray PA lateral March 9, 2020\par Normal cardiac size\par Clear lung fields without parenchymal infiltrates pleural effusions dominant pulmonary nodules\par Tissue bony structures normal\par Roxie mediastinum normal\par Impression clear lungs\par \par  CPAP DATA data  4/5/21\par usage 90 %\par Hopurs >6\par Auto CPAp 6 - 16 cm h20\par AHI 2.9\par \par Flu Vaccine  11/14/2019\par PFIZER completed\par \par f/u with Resp Therapy - CPAP initiation with  mask refit

## 2021-05-12 NOTE — PHYSICAL EXAM
[General Appearance - Well Developed] : well developed [Normal Appearance] : normal appearance [Well Groomed] : well groomed [General Appearance - Well Nourished] : well nourished [No Deformities] : no deformities [General Appearance - In No Acute Distress] : no acute distress [Normal Conjunctiva] : the conjunctiva exhibited no abnormalities [Eyelids - No Xanthelasma] : the eyelids demonstrated no xanthelasmas [Normal Oropharynx] : normal oropharynx [FreeTextEntry1] : non icteric [II] : II [Neck Appearance] : the appearance of the neck was normal [Neck Cervical Mass (___cm)] : no neck mass was observed [Jugular Venous Distention Increased] : there was no jugular-venous distention [Thyroid Diffuse Enlargement] : the thyroid was not enlarged [Heart Sounds] : normal S1 and S2 [Heart Rate And Rhythm] : heart rate and rhythm were normal [Murmurs] : no murmurs present [Arterial Pulses Normal] : the arterial pulses were normal [Edema] : no peripheral edema present [Veins - Varicosity Changes] : no varicosital changes were noted in the lower extremities [Respiration, Rhythm And Depth] : normal respiratory rhythm and effort [Exaggerated Use Of Accessory Muscles For Inspiration] : no accessory muscle use [Chest Palpation] : palpation of the chest revealed no abnormalities [Auscultation Breath Sounds / Voice Sounds] : lungs were clear to auscultation bilaterally [Lungs Percussion] : the lungs were normal to percussion [Abdomen Tenderness] : non-tender [Abdomen Soft] : soft [Abdomen Mass (___ Cm)] : no abdominal mass palpated [Abnormal Walk] : normal gait [Gait - Sufficient For Exercise Testing] : the gait was sufficient for exercise testing [Nail Clubbing] : no clubbing of the fingernails [Cyanosis, Localized] : no localized cyanosis [Petechial Hemorrhages (___cm)] : no petechial hemorrhages [Skin Color & Pigmentation] : normal skin color and pigmentation [] : no rash [No Venous Stasis] : no venous stasis [Skin Lesions] : no skin lesions [No Skin Ulcers] : no skin ulcer [No Xanthoma] : no  xanthoma was observed [Deep Tendon Reflexes (DTR)] : deep tendon reflexes were 2+ and symmetric [Sensation] : the sensory exam was normal to light touch and pinprick [No Focal Deficits] : no focal deficits [Oriented To Time, Place, And Person] : oriented to person, place, and time [Impaired Insight] : insight and judgment were intact [Affect] : the affect was normal

## 2021-05-12 NOTE — REVIEW OF SYSTEMS
[Ear Disturbance] : no ear disturbance [Nasal Congestion] : no nasal congestion [Epistaxis] : no nosebleeds [Postnasal Drip] : no postnasal drip [Sinus Problems] : sinus problems [Sore Throat] : no sore throat [Dry Mouth] : no dry mouth [Hypertension] : ~T hypertension [As Noted in HPI] : as noted in HPI [PND] : no PND [Orthopnea] : no orthopnea [Palpitations] : no palpitations [Edema] : ~T edema was not present [Claudication] : no intermittent claudication [Leg Cramps] : no leg cramps [Back Pain] : ~T back pain [Anemia] : anemia [Negative] : Pulmonary Hypertension [FreeTextEntry6] : management Dr Abraham Escalante [FreeTextEntry5] : colonoscopy 2016

## 2021-05-12 NOTE — DISCUSSION/SUMMARY
[FreeTextEntry1] : Mild  decline Pulmonary Physiolgy\par LEONARD with significant improvement of CPAP usage- addressed risks /benefits- discussed  benefits re usage\par Mild asthma need daily compliance with use Symbicort and rinse\par Off Spiriva\par    Hypertension borderline\par Proteinuria with history mild renal insufficiency management PMD\par Heme  noted\par continue singulair 10 mg QD\par Symbicort  and prn Proair as noted\par LEONARD- AUTO CPAP  6 - 18 cm H20\par  Proair before swimming 2 puffs\par \par Anemia w/u and noted stool negative heme renal\par ADDED  spiriva 2.5 mcg 2 puffs QD- HOLD\par \par Recommendation patient to consider bariatric surgery- did  not proceed\par Do believe her weight contributes to her sensation of shortness of breath\par Referral Cyrus Valiente MD- on hold\par \par f/u mammogram per PMD management\par  Colonoscopy- completed with Dr Birch\par f/u RENAl for proteinuria- Med f/u\par cardiology ECHO  4/14/21 noted\par \par Patient compliant with CPAP therapy.  Continue present settings.  Patient with demonstrated clinical benefit with daytime and nocturnal symptomatology improvement.\par \par DEXA  Sept 2022\par Note has PCP for  overall  management and  will  defer other  medical  issues  to  her  care\par  \par

## 2021-05-12 NOTE — HISTORY OF PRESENT ILLNESS
[Stable] : are stable [None] : ~He/She~ has no significant interval events [Difficulty Breathing During Exertion] : stable dyspnea on exertion [Feelings Of Weakness On Exertion] : stable exercise intolerance [Cough] : denies coughing [Wheezing] : denies wheezing [Regional Soft Tissue Swelling Both Lower Extremities] : denies lower extremity edema [Chest Pain Or Discomfort] : denies chest pain [Fever] : denies fever [Wt Gain ___ Lbs] : no recent weight gain [Wt Loss ___ Lbs] : no recent weight loss [Oxygen] : the patient uses no supplemental oxygen [Obstructive Sleep Apnea] : obstructive sleep apnea [Date: ___] : Date of most recent diagnostic polysomnogram: [unfilled] [AHI: ___ per hour] : Apnea-hypopnea index:  [unfilled] per hour [Nocturnal Oxygen] : The patient does not use nocturnal oxygen [FreeTextEntry1] : no active asthma sxs\par  issue with CPAP use \par does use So Clean but was not compliant \par no viral\par  no travel\par non sp  lumbar back pain\par Notes some component  left hip  pain with numbness  radiation left\par HEME Noted

## 2021-05-15 ENCOUNTER — RX RENEWAL (OUTPATIENT)
Age: 56
End: 2021-05-15

## 2021-06-16 ENCOUNTER — RX RENEWAL (OUTPATIENT)
Age: 56
End: 2021-06-16

## 2021-06-21 ENCOUNTER — RX RENEWAL (OUTPATIENT)
Age: 56
End: 2021-06-21

## 2021-06-24 ENCOUNTER — RX RENEWAL (OUTPATIENT)
Age: 56
End: 2021-06-24

## 2021-07-11 ENCOUNTER — RX RENEWAL (OUTPATIENT)
Age: 56
End: 2021-07-11

## 2021-07-28 ENCOUNTER — RX RENEWAL (OUTPATIENT)
Age: 56
End: 2021-07-28

## 2021-08-08 ENCOUNTER — RX RENEWAL (OUTPATIENT)
Age: 56
End: 2021-08-08

## 2021-08-17 ENCOUNTER — APPOINTMENT (OUTPATIENT)
Dept: DISASTER EMERGENCY | Facility: CLINIC | Age: 56
End: 2021-08-17

## 2021-08-18 LAB — SARS-COV-2 N GENE NPH QL NAA+PROBE: NOT DETECTED

## 2021-08-20 ENCOUNTER — APPOINTMENT (OUTPATIENT)
Dept: PULMONOLOGY | Facility: CLINIC | Age: 56
End: 2021-08-20
Payer: COMMERCIAL

## 2021-08-20 PROCEDURE — ZZZZZ: CPT

## 2021-08-20 PROCEDURE — 94726 PLETHYSMOGRAPHY LUNG VOLUMES: CPT

## 2021-08-20 PROCEDURE — 94010 BREATHING CAPACITY TEST: CPT

## 2021-08-20 PROCEDURE — 99214 OFFICE O/P EST MOD 30 MIN: CPT | Mod: 25

## 2021-08-20 PROCEDURE — 88738 HGB QUANT TRANSCUTANEOUS: CPT

## 2021-08-20 PROCEDURE — 94729 DIFFUSING CAPACITY: CPT

## 2021-08-20 NOTE — PROCEDURE
[FreeTextEntry1] : PFT 8/20/21\par flow rates nl\par  mild OAD\par TLC 94\par RES IS INC SP CONDUCTANCE DEC\par DLCO 88 %\par interval improvement of flow rates\par \par PFT 5/12/21\par mILD oad\par lUNG vOLUMES NL\par DLCO 80 % wnl\par hgb 11.9\par \par PFT 2/22/21\par Normal  flow rates\par  minimal OAD\par Normal  lung volumes borderline airtrapping\par  Normal DLCO  84 %  pred\par  HGB 11.7\par \par Chest x-ray PA lateral January 29, 2021\par Normal cardiac size\par Clear lungs\par No parenchymal infiltrates pleural effusions with dominant pulmonary nodules left well-circumscribed intrapulmonary lymph node\par Tiny nodularity noted right lower lung zone most consistent with granuloma dating back  2 years\par \par PFT Nov 23 2020\par  Mild OAD\par normal lung volumes\par Normal DLCO\par  HGB 11.4 \par Interval improvement at Flow Rates \par \par NIOX 69 PPD October 22, 2020\par PFT October 22, 2020\par Mild reduction flow rates with a mild obstructive ventilatory impairment\par No significant response to bronchodilator at the FEV1 measuring 7%\par Borderline response at the small airways of 15%\par Lung volumes demonstrate normal total lung capacity\par Air trapping with RV/TLC ratio 38% predicted and a decreased ERV likely secondary to patient increased abdominal girth\par Diffusion normal 100% predicted.\par Hemoglobin 11.4\par Data compared to August 11, 2020 does demonstrate some mild reduction at the flow rates\par \par DEXA 9/17/20\par normal study\par \par 8/11/2020\par NIOX 50\par PFT\par  spirometry normal\par 30% response to bronchodilator at the FEV1\par Normal lung volumes\par Diffusion normal 92% predicted.\par Positive bronchodilator response\par \par Chest x-ray PA lateral March 9, 2020\par Normal cardiac size\par Clear lung fields without parenchymal infiltrates pleural effusions dominant pulmonary nodules\par Tissue bony structures normal\par Roxie mediastinum normal\par Impression clear lungs\par \par  CPAP DATA data   8/20/21\par usage 70 %\par Hours >5\par Auto CPAp 6 - 16 cm h20\par AHI 2.5\par \par PFIZER completed\par \par Echocardiogram August 14, 2021\par Mild concentric LVH\par Impaired LV relaxation with normal filling pressure\par Myxomatous mitral valve\par Mild to moderate mitral regurgitation\par Mild tricuspid regurgitation\par Normal global LV systolic function\par Stress test treadmill August 14, 2021\par No reported ischemic changes\par No arrhythmia noted\par \par \par

## 2021-08-20 NOTE — HISTORY OF PRESENT ILLNESS
[Stable] : are stable [None] : ~He/She~ has no significant interval events [Difficulty Breathing During Exertion] : stable dyspnea on exertion [Feelings Of Weakness On Exertion] : stable exercise intolerance [Cough] : denies coughing [Wheezing] : denies wheezing [Regional Soft Tissue Swelling Both Lower Extremities] : denies lower extremity edema [Chest Pain Or Discomfort] : denies chest pain [Fever] : denies fever [Obstructive Sleep Apnea] : obstructive sleep apnea [Date: ___] : Date of most recent diagnostic polysomnogram: [unfilled] [AHI: ___ per hour] : Apnea-hypopnea index:  [unfilled] per hour [Wt Gain ___ Lbs] : no recent weight gain [Wt Loss ___ Lbs] : no recent weight loss [Oxygen] : the patient uses no supplemental oxygen [Nocturnal Oxygen] : The patient does not use nocturnal oxygen [FreeTextEntry1] : no active asthma sxs\par  issue with CPAP use \par does use So Clean but was not compliant \par no viral\par  no travel\par non sp  lumbar back pain\par Notes some component  left hip  pain with numbness  radiation left\par HEME Noted

## 2021-08-20 NOTE — DISCUSSION/SUMMARY
[FreeTextEntry1] : Mild  decline Pulmonary Physiology\par LEONARD with significant improvement of CPAP usage- addressed risks /benefits- discussed  benefits re usage\par Mild asthma need daily compliance with use Symbicort and rinse\par Off Spiriva\par    Hypertension borderline\par Proteinuria with history mild renal insufficiency management PMD\par Heme  noted\par continue singulair 10 mg QD\par Symbicort  and prn Proair as noted\par LEONARD- AUTO CPAP  6 - 18 cm H20 RESMED\par  Proair before swimming 2 puffs\par \par Anemia w/u and noted stool negative heme renal\par ADDED  spiriva 2.5 mcg 2 puffs QD- HOLD\par \par Recommendation patient to consider bariatric surgery- did  not proceed\par Do believe her weight contributes to her sensation of shortness of breath\par Referral Cyrus Valiente MD- on hold\par \par f/u mammogram per PMD management\par  Colonoscopy- completed with Dr Birch\par f/u RENAl for proteinuria- Med f/u\par cardiology ECHO  4/14/21 noted\par \par Patient compliant with CPAP therapy.  Continue present settings.  Patient with demonstrated clinical benefit with daytime and nocturnal symptomatology improvement.\par \par DEXA  Sept 2022\par Note has PCP for  overall  management and  will  defer other  medical  issues  to  her  care\par  \par

## 2021-08-20 NOTE — PHYSICAL EXAM
[General Appearance - Well Developed] : well developed [Normal Appearance] : normal appearance [Well Groomed] : well groomed [General Appearance - Well Nourished] : well nourished [No Deformities] : no deformities [General Appearance - In No Acute Distress] : no acute distress [Normal Conjunctiva] : the conjunctiva exhibited no abnormalities [Eyelids - No Xanthelasma] : the eyelids demonstrated no xanthelasmas [Normal Oropharynx] : normal oropharynx [II] : II [Neck Appearance] : the appearance of the neck was normal [Neck Cervical Mass (___cm)] : no neck mass was observed [Jugular Venous Distention Increased] : there was no jugular-venous distention [Thyroid Diffuse Enlargement] : the thyroid was not enlarged [Heart Sounds] : normal S1 and S2 [Heart Rate And Rhythm] : heart rate and rhythm were normal [Murmurs] : no murmurs present [Arterial Pulses Normal] : the arterial pulses were normal [Edema] : no peripheral edema present [Veins - Varicosity Changes] : no varicosital changes were noted in the lower extremities [Respiration, Rhythm And Depth] : normal respiratory rhythm and effort [Exaggerated Use Of Accessory Muscles For Inspiration] : no accessory muscle use [Auscultation Breath Sounds / Voice Sounds] : lungs were clear to auscultation bilaterally [Chest Palpation] : palpation of the chest revealed no abnormalities [Lungs Percussion] : the lungs were normal to percussion [Abdomen Soft] : soft [Abdomen Tenderness] : non-tender [Abdomen Mass (___ Cm)] : no abdominal mass palpated [Abnormal Walk] : normal gait [Gait - Sufficient For Exercise Testing] : the gait was sufficient for exercise testing [Nail Clubbing] : no clubbing of the fingernails [Petechial Hemorrhages (___cm)] : no petechial hemorrhages [Cyanosis, Localized] : no localized cyanosis [Skin Color & Pigmentation] : normal skin color and pigmentation [No Venous Stasis] : no venous stasis [] : no rash [Skin Lesions] : no skin lesions [No Skin Ulcers] : no skin ulcer [No Xanthoma] : no  xanthoma was observed [Deep Tendon Reflexes (DTR)] : deep tendon reflexes were 2+ and symmetric [Sensation] : the sensory exam was normal to light touch and pinprick [No Focal Deficits] : no focal deficits [Oriented To Time, Place, And Person] : oriented to person, place, and time [Impaired Insight] : insight and judgment were intact [Affect] : the affect was normal [FreeTextEntry1] : non icteric

## 2021-09-02 ENCOUNTER — RX RENEWAL (OUTPATIENT)
Age: 56
End: 2021-09-02

## 2021-11-05 ENCOUNTER — RX RENEWAL (OUTPATIENT)
Age: 56
End: 2021-11-05

## 2021-11-08 ENCOUNTER — APPOINTMENT (OUTPATIENT)
Dept: DISASTER EMERGENCY | Facility: CLINIC | Age: 56
End: 2021-11-08

## 2021-11-09 LAB — SARS-COV-2 N GENE NPH QL NAA+PROBE: NOT DETECTED

## 2021-11-12 ENCOUNTER — APPOINTMENT (OUTPATIENT)
Dept: PULMONOLOGY | Facility: CLINIC | Age: 56
End: 2021-11-12
Payer: COMMERCIAL

## 2021-11-12 VITALS
HEART RATE: 88 BPM | SYSTOLIC BLOOD PRESSURE: 107 MMHG | TEMPERATURE: 97.6 F | DIASTOLIC BLOOD PRESSURE: 71 MMHG | OXYGEN SATURATION: 97 %

## 2021-11-12 DIAGNOSIS — R09.82 POSTNASAL DRIP: ICD-10-CM

## 2021-11-12 LAB — POCT - HEMOGLOBIN (HGB), QUANTITATIVE, TRANSCUTANEOUS: 11.8

## 2021-11-12 PROCEDURE — 94727 GAS DIL/WSHOT DETER LNG VOL: CPT

## 2021-11-12 PROCEDURE — 99214 OFFICE O/P EST MOD 30 MIN: CPT | Mod: 25

## 2021-11-12 PROCEDURE — ZZZZZ: CPT

## 2021-11-12 PROCEDURE — G0008: CPT

## 2021-11-12 PROCEDURE — 88738 HGB QUANT TRANSCUTANEOUS: CPT

## 2021-11-12 PROCEDURE — 94010 BREATHING CAPACITY TEST: CPT

## 2021-11-12 PROCEDURE — 90686 IIV4 VACC NO PRSV 0.5 ML IM: CPT

## 2021-11-12 PROCEDURE — 94729 DIFFUSING CAPACITY: CPT

## 2021-11-12 NOTE — PHYSICAL EXAM
[General Appearance - Well Developed] : well developed [Normal Appearance] : normal appearance [Well Groomed] : well groomed [General Appearance - Well Nourished] : well nourished [No Deformities] : no deformities [General Appearance - In No Acute Distress] : no acute distress [Normal Conjunctiva] : the conjunctiva exhibited no abnormalities [Eyelids - No Xanthelasma] : the eyelids demonstrated no xanthelasmas [Normal Oropharynx] : normal oropharynx [II] : II [Neck Appearance] : the appearance of the neck was normal [Neck Cervical Mass (___cm)] : no neck mass was observed [Jugular Venous Distention Increased] : there was no jugular-venous distention [Thyroid Diffuse Enlargement] : the thyroid was not enlarged [Heart Rate And Rhythm] : heart rate and rhythm were normal [Murmurs] : no murmurs present [Heart Sounds] : normal S1 and S2 [Arterial Pulses Normal] : the arterial pulses were normal [Edema] : no peripheral edema present [Veins - Varicosity Changes] : no varicosital changes were noted in the lower extremities [Respiration, Rhythm And Depth] : normal respiratory rhythm and effort [Exaggerated Use Of Accessory Muscles For Inspiration] : no accessory muscle use [Auscultation Breath Sounds / Voice Sounds] : lungs were clear to auscultation bilaterally [Chest Palpation] : palpation of the chest revealed no abnormalities [Lungs Percussion] : the lungs were normal to percussion [Abdomen Soft] : soft [Abdomen Tenderness] : non-tender [Abdomen Mass (___ Cm)] : no abdominal mass palpated [Abnormal Walk] : normal gait [Gait - Sufficient For Exercise Testing] : the gait was sufficient for exercise testing [Nail Clubbing] : no clubbing of the fingernails [Cyanosis, Localized] : no localized cyanosis [Petechial Hemorrhages (___cm)] : no petechial hemorrhages [Skin Color & Pigmentation] : normal skin color and pigmentation [] : no rash [No Venous Stasis] : no venous stasis [Skin Lesions] : no skin lesions [No Skin Ulcers] : no skin ulcer [No Xanthoma] : no  xanthoma was observed [Deep Tendon Reflexes (DTR)] : deep tendon reflexes were 2+ and symmetric [Sensation] : the sensory exam was normal to light touch and pinprick [No Focal Deficits] : no focal deficits [Oriented To Time, Place, And Person] : oriented to person, place, and time [Impaired Insight] : insight and judgment were intact [Affect] : the affect was normal [FreeTextEntry1] : non icteric

## 2021-11-12 NOTE — PROCEDURE
[FreeTextEntry1] : PFT 11/12/21\par Normal flow rates\par  lung volumes nl\par  DLCO 86 % pred WNL\par HGB 11.1\par \par PFT 8/20/21\par flow rates nl\par  mild OAD\par TLC 94\par RES IS INC SP CONDUCTANCE DEC\par DLCO 88 %\par interval improvement of flow rates\par \par PFT 5/12/21\par mILD oad\par lUNG vOLUMES NL\par DLCO 80 % wnl\par hgb 11.9\par \par PFT 2/22/21\par Normal  flow rates\par  minimal OAD\par Normal  lung volumes borderline airtrapping\par  Normal DLCO  84 %  pred\par  HGB 11.7\par \par Chest x-ray PA lateral January 29, 2021\par Normal cardiac size\par Clear lungs\par No parenchymal infiltrates pleural effusions with dominant pulmonary nodules left well-circumscribed intrapulmonary lymph node\par Tiny nodularity noted right lower lung zone most consistent with granuloma dating back  2 years\par \par PFT Nov 23 2020\par  Mild OAD\par normal lung volumes\par Normal DLCO\par  HGB 11.4 \par Interval improvement at Flow Rates \par \par NIOX 69 PPD October 22, 2020\par PFT October 22, 2020\par Mild reduction flow rates with a mild obstructive ventilatory impairment\par No significant response to bronchodilator at the FEV1 measuring 7%\par Borderline response at the small airways of 15%\par Lung volumes demonstrate normal total lung capacity\par Air trapping with RV/TLC ratio 38% predicted and a decreased ERV likely secondary to patient increased abdominal girth\par Diffusion normal 100% predicted.\par Hemoglobin 11.4\par Data compared to August 11, 2020 does demonstrate some mild reduction at the flow rates\par \par DEXA 9/17/20\par normal study\par \par 8/11/2020\par NIOX 50\par PFT\par  spirometry normal\par 30% response to bronchodilator at the FEV1\par Normal lung volumes\par Diffusion normal 92% predicted.\par Positive bronchodilator response\par \par Chest x-ray PA lateral March 9, 2020\par Normal cardiac size\par Clear lung fields without parenchymal infiltrates pleural effusions dominant pulmonary nodules\par Tissue bony structures normal\par Roxie mediastinum normal\par Impression clear lungs\par \par  CPAP DATA data   8/20/21\par usage 70 %\par Hours >5\par Auto CPAp 6 - 16 cm h20\par AHI 2.5\par \par PFIZER completed\par \par Echocardiogram August 14, 2021\par Mild concentric LVH\par Impaired LV relaxation with normal filling pressure\par Myxomatous mitral valve\par Mild to moderate mitral regurgitation\par Mild tricuspid regurgitation\par Normal global LV systolic function\par Stress test treadmill August 14, 2021\par No reported ischemic changes\par No arrhythmia noted\par \par \par

## 2021-11-12 NOTE — DISCUSSION/SUMMARY
[FreeTextEntry1] : Mild  decline Pulmonary Physiology\par LEONARD with significant improvement of CPAP usage- addressed risks /benefits- discussed  benefits re usage\par Mild asthma need daily compliance with use Symbicort and rinse\par sinus disease\par Off Spiriva\par    Hypertension borderline\par Proteinuria with history mild renal insufficiency management PMD\par Heme  noted\par continue singulair 10 mg QD\par Symbicort  and prn Proair as noted\par LEONARD- AUTO CPAP  6 - 18 cm H20 RESMED\par  Proair before swimming 2 puffs\par \par Anemia w/u and noted stool negative heme renal\par ADDED  spiriva 2.5 mcg 2 puffs QD- HOLD\par \par Recommendation patient to consider bariatric surgery- did  not proceed\par Do believe her weight contributes to her sensation of shortness of breath\par Referral Cyrus Valiente MD- on hold\par \par f/u mammogram per PMD management\par  Colonoscopy- completed with Dr Birch\par f/u RENAl for proteinuria- Med f/u\par cardiology ECHO  4/14/21 noted\par \par Patient compliant with CPAP therapy.  Continue present settings.  Patient with demonstrated clinical benefit with daytime and nocturnal symptomatology improvement.\par \par DEXA  Sept 2022\par Note has PCP for  overall  management and  will  defer other  medical  issues  to  her  care\par  \par

## 2021-11-12 NOTE — HISTORY OF PRESENT ILLNESS
[Stable] : are stable [None] : ~He/She~ has no significant interval events [Difficulty Breathing During Exertion] : stable dyspnea on exertion [Cough] : denies coughing [Feelings Of Weakness On Exertion] : stable exercise intolerance [Wheezing] : denies wheezing [Regional Soft Tissue Swelling Both Lower Extremities] : denies lower extremity edema [Chest Pain Or Discomfort] : denies chest pain [Fever] : denies fever [Obstructive Sleep Apnea] : obstructive sleep apnea [Date: ___] : Date of most recent diagnostic polysomnogram: [unfilled] [AHI: ___ per hour] : Apnea-hypopnea index:  [unfilled] per hour [Wt Gain ___ Lbs] : no recent weight gain [Wt Loss ___ Lbs] : no recent weight loss [Oxygen] : the patient uses no supplemental oxygen [Nocturnal Oxygen] : The patient does not use nocturnal oxygen [FreeTextEntry1] : sinus sxs\par ? allergy component\par \par no active asthma sxs\par  issue with CPAP use \par does use So Clean but was not compliant - advised not to use\par no viral\par  no travel\par non sp  lumbar back pain\par Notes some component  left hip  pain with numbness  radiation left\par HEME Noted

## 2021-12-01 ENCOUNTER — OUTPATIENT (OUTPATIENT)
Dept: OUTPATIENT SERVICES | Facility: HOSPITAL | Age: 56
LOS: 1 days | Discharge: HOME | End: 2021-12-01

## 2021-12-01 DIAGNOSIS — D64.9 ANEMIA, UNSPECIFIED: ICD-10-CM

## 2021-12-02 ENCOUNTER — RESULT REVIEW (OUTPATIENT)
Age: 56
End: 2021-12-02

## 2021-12-02 ENCOUNTER — APPOINTMENT (OUTPATIENT)
Dept: HEMATOLOGY ONCOLOGY | Facility: CLINIC | Age: 56
End: 2021-12-02
Payer: COMMERCIAL

## 2021-12-02 VITALS
HEART RATE: 81 BPM | DIASTOLIC BLOOD PRESSURE: 79 MMHG | TEMPERATURE: 97.7 F | OXYGEN SATURATION: 99 % | HEIGHT: 69.65 IN | WEIGHT: 231.46 LBS | SYSTOLIC BLOOD PRESSURE: 111 MMHG | RESPIRATION RATE: 18 BRPM | BODY MASS INDEX: 33.51 KG/M2

## 2021-12-02 LAB
ALBUMIN SERPL ELPH-MCNC: 4.7 G/DL
ALP BLD-CCNC: 88 U/L
ALT SERPL-CCNC: 18 U/L
ANION GAP SERPL CALC-SCNC: 13 MMOL/L
AST SERPL-CCNC: 20 U/L
BASOPHILS # BLD AUTO: 0.03 K/UL — SIGNIFICANT CHANGE UP (ref 0–0.2)
BASOPHILS NFR BLD AUTO: 0.5 % — SIGNIFICANT CHANGE UP (ref 0–2)
BILIRUB SERPL-MCNC: 0.4 MG/DL
BUN SERPL-MCNC: 19 MG/DL
CALCIUM SERPL-MCNC: 10.3 MG/DL
CHLORIDE SERPL-SCNC: 103 MMOL/L
CO2 SERPL-SCNC: 24 MMOL/L
CREAT SERPL-MCNC: 1 MG/DL
EOSINOPHIL # BLD AUTO: 0.06 K/UL — SIGNIFICANT CHANGE UP (ref 0–0.5)
EOSINOPHIL NFR BLD AUTO: 1.1 % — SIGNIFICANT CHANGE UP (ref 0–6)
FERRITIN SERPL-MCNC: 286 NG/ML
GLUCOSE SERPL-MCNC: 87 MG/DL
HCT VFR BLD CALC: 38.2 % — SIGNIFICANT CHANGE UP (ref 34.5–45)
HGB BLD-MCNC: 11.9 G/DL — SIGNIFICANT CHANGE UP (ref 11.5–15.5)
IMM GRANULOCYTES NFR BLD AUTO: 0.4 % — SIGNIFICANT CHANGE UP (ref 0–1.5)
IRON SATN MFR SERPL: 32 %
IRON SERPL-MCNC: 109 UG/DL
LYMPHOCYTES # BLD AUTO: 2.1 K/UL — SIGNIFICANT CHANGE UP (ref 1–3.3)
LYMPHOCYTES # BLD AUTO: 37.7 % — SIGNIFICANT CHANGE UP (ref 13–44)
MCHC RBC-ENTMCNC: 29.2 PG — SIGNIFICANT CHANGE UP (ref 27–34)
MCHC RBC-ENTMCNC: 31.2 G/DL — LOW (ref 32–36)
MCV RBC AUTO: 93.9 FL — SIGNIFICANT CHANGE UP (ref 80–100)
MONOCYTES # BLD AUTO: 0.44 K/UL — SIGNIFICANT CHANGE UP (ref 0–0.9)
MONOCYTES NFR BLD AUTO: 7.9 % — SIGNIFICANT CHANGE UP (ref 2–14)
NEUTROPHILS # BLD AUTO: 2.92 K/UL — SIGNIFICANT CHANGE UP (ref 1.8–7.4)
NEUTROPHILS NFR BLD AUTO: 52.4 % — SIGNIFICANT CHANGE UP (ref 43–77)
NRBC # BLD: 0 /100 WBCS — SIGNIFICANT CHANGE UP (ref 0–0)
PLATELET # BLD AUTO: 274 K/UL — SIGNIFICANT CHANGE UP (ref 150–400)
POTASSIUM SERPL-SCNC: 4.4 MMOL/L
PROT SERPL-MCNC: 7.8 G/DL
RBC # BLD: 4.07 M/UL — SIGNIFICANT CHANGE UP (ref 3.8–5.2)
RBC # FLD: 12.5 % — SIGNIFICANT CHANGE UP (ref 10.3–14.5)
SODIUM SERPL-SCNC: 140 MMOL/L
TIBC SERPL-MCNC: 337 UG/DL
UIBC SERPL-MCNC: 229 UG/DL
WBC # BLD: 5.57 K/UL — SIGNIFICANT CHANGE UP (ref 3.8–10.5)
WBC # FLD AUTO: 5.57 K/UL — SIGNIFICANT CHANGE UP (ref 3.8–10.5)

## 2021-12-02 PROCEDURE — 99214 OFFICE O/P EST MOD 30 MIN: CPT

## 2021-12-02 NOTE — ASSESSMENT
[FreeTextEntry1] : 57yo F with PMH of asthma, morbid obesity,  sleep apnea on CPAP, HTN, proteinuria. Patient was referred for evaluation and management of anemia. \par \par Laboratory studies CBC 6/24/19 : WBC 4.4, Hb 11.7, RBC 3.87, MCV 92.5, Hct 35.9%, RDW 11.9%, PLTs 257.  \par \par \par 4/22/21 B 12 - 471\par ferritin 272\par Folate 15.8. \par \par Light chain kappa 2.89 mildly elevated but kappa/ lambda ratio were normal. \par Immunofixation was negative for monoclonal band. \par \par Anemia work up was done today. \par \par -Back pain, been evaluated by chiropractor and pain management. \par \par - Sleep apnea: had an echo, stress test and EKG done, following with pulmonary. \par \par - Obesity : trying to loss weight with weight training, yoga and diet modifications. \par \par -HTN: on Olmesartan. \par \par \par RTC  6 months.

## 2021-12-02 NOTE — REVIEW OF SYSTEMS
[Joint Pain] : joint pain [Negative] : Allergic/Immunologic [Fatigue] : no fatigue [SOB on Exertion] : no shortness of breath during exertion [FreeTextEntry9] : low back pain pain radiates to the right thigh, pain in the right elbow.

## 2021-12-02 NOTE — HISTORY OF PRESENT ILLNESS
[0 - No Distress] : Distress Level: 0 [de-identified] : 56 yo F with PMH of asthma, morbid obesity,  sleep apnea on CPAP, HTN, proteinuria. Patient was referred for evaluation and management of anemia. \par \par Laboratory studies from November, 2018 c/w folate 13, B 12 355.\par Iron studies ferritin 225, iron 127, TIBC 294, % saturation iron 43%.\par Creatine 0.78. \par \par Patient has history of iron deficiency anemia secondary to menorrhagia; s/p hysterectomy due to uterine fibroids.\par \par Patient has been followed by Dr Bruno Breen for history of renal insufficiency in 2005,  and proteinuria which has been stable. Patient developed BIJAN and proteinuria  in the setting of NSAID use and streptococcal infection. Pt. with acute post streptococcal GN in 2005.\par \par Patient denies feeling dizzy, lightheadedness, palpitations, recently diagnosed with asthma within the last year complaints of sob with exertion. 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 \par \par  [de-identified] : Patient feels tired, denies fevers, night sweats or weight loss. Patient is a teacher. \par \par 4/22/21 B 12 - 471\par ferritin 272\par Folate 15.8. \par \par No other changes in medical, surgical or social history since 4/22/2021. \par \par \par \par \par

## 2021-12-30 ENCOUNTER — LABORATORY RESULT (OUTPATIENT)
Age: 56
End: 2021-12-30

## 2021-12-30 ENCOUNTER — APPOINTMENT (OUTPATIENT)
Dept: CARDIOLOGY | Facility: CLINIC | Age: 56
End: 2021-12-30
Payer: COMMERCIAL

## 2021-12-30 ENCOUNTER — NON-APPOINTMENT (OUTPATIENT)
Age: 56
End: 2021-12-30

## 2021-12-30 VITALS
DIASTOLIC BLOOD PRESSURE: 90 MMHG | BODY MASS INDEX: 34.31 KG/M2 | HEIGHT: 69.65 IN | OXYGEN SATURATION: 99 % | WEIGHT: 237 LBS | SYSTOLIC BLOOD PRESSURE: 134 MMHG | TEMPERATURE: 98.9 F | HEART RATE: 85 BPM

## 2021-12-30 VITALS — SYSTOLIC BLOOD PRESSURE: 132 MMHG | DIASTOLIC BLOOD PRESSURE: 82 MMHG

## 2021-12-30 DIAGNOSIS — R03.0 ELEVATED BLOOD-PRESSURE READING, W/OUT DIAGNOSIS OF HYPERTENSION: ICD-10-CM

## 2021-12-30 DIAGNOSIS — Z00.00 ENCOUNTER FOR GENERAL ADULT MEDICAL EXAMINATION W/OUT ABNORMAL FINDINGS: ICD-10-CM

## 2021-12-30 PROCEDURE — 99214 OFFICE O/P EST MOD 30 MIN: CPT

## 2021-12-30 PROCEDURE — 93000 ELECTROCARDIOGRAM COMPLETE: CPT

## 2021-12-30 NOTE — HISTORY OF PRESENT ILLNESS
[FreeTextEntry1] : Annual Physical Exam  [de-identified] : This is a 56 year old lady with a PMH of HTN, Asthma, Anemia, Renal Failure, LEONARD, and Chronic Back pain who presents today for annual wellness exam. Patient states that she is overall feeling good and has no major complaints at this time. Patient states that she still has chronic back pain which she is following with Chiropractor for. Patient states that she has baseline SOB that has been occurring for as long as she can remember. Patient states that she has had workup previously for cardiac and pulmonary etiology and reports improvement. Patient denies worsening dyspnea, palpitations, chest pain, nausea, vomiting, dizziness and lightheadedness.\par

## 2021-12-30 NOTE — REVIEW OF SYSTEMS
[Shortness Of Breath] : shortness of breath [Dyspnea on Exertion] : dyspnea on exertion [Back Pain] : back pain [Negative] : Heme/Lymph [Wheezing] : no wheezing [Cough] : no cough [Joint Pain] : no joint pain [Joint Stiffness] : no joint stiffness [Joint Swelling] : no joint swelling [Muscle Weakness] : no muscle weakness [Muscle Pain] : no muscle pain

## 2022-01-02 LAB
25(OH)D3 SERPL-MCNC: 14.8 NG/ML
APPEARANCE: CLEAR
BACTERIA: NEGATIVE
BILIRUBIN URINE: NEGATIVE
BLOOD URINE: NEGATIVE
CHOLEST SERPL-MCNC: 205 MG/DL
COLOR: COLORLESS
ESTIMATED AVERAGE GLUCOSE: 103 MG/DL
GLUCOSE QUALITATIVE U: NEGATIVE
HBA1C MFR BLD HPLC: 5.2 %
HDLC SERPL-MCNC: 89 MG/DL
HYALINE CASTS: 1 /LPF
KETONES URINE: NEGATIVE
LDLC SERPL CALC-MCNC: 102 MG/DL
LEUKOCYTE ESTERASE URINE: NEGATIVE
MAGNESIUM SERPL-MCNC: 2 MG/DL
MICROSCOPIC-UA: NORMAL
NITRITE URINE: NEGATIVE
NONHDLC SERPL-MCNC: 116 MG/DL
PH URINE: 6
PHOSPHATE SERPL-MCNC: 3.3 MG/DL
PROTEIN URINE: NEGATIVE
RED BLOOD CELLS URINE: 2 /HPF
SPECIFIC GRAVITY URINE: 1.01
SQUAMOUS EPITHELIAL CELLS: 3 /HPF
T3RU NFR SERPL: 1 TBI
T4 FREE SERPL-MCNC: 1.1 NG/DL
T4 SERPL-MCNC: 5.3 UG/DL
TRIGL SERPL-MCNC: 72 MG/DL
TSH SERPL-ACNC: 1.04 UIU/ML
URATE SERPL-MCNC: 5.5 MG/DL
UROBILINOGEN URINE: NORMAL
WHITE BLOOD CELLS URINE: 3 /HPF

## 2022-01-19 ENCOUNTER — RX RENEWAL (OUTPATIENT)
Age: 57
End: 2022-01-19

## 2022-01-31 ENCOUNTER — RX RENEWAL (OUTPATIENT)
Age: 57
End: 2022-01-31

## 2022-02-02 ENCOUNTER — RESULT REVIEW (OUTPATIENT)
Age: 57
End: 2022-02-02

## 2022-02-10 DIAGNOSIS — Z01.812 ENCOUNTER FOR PREPROCEDURAL LABORATORY EXAMINATION: ICD-10-CM

## 2022-02-18 ENCOUNTER — APPOINTMENT (OUTPATIENT)
Dept: PULMONOLOGY | Facility: CLINIC | Age: 57
End: 2022-02-18
Payer: COMMERCIAL

## 2022-02-18 VITALS
HEART RATE: 90 BPM | HEIGHT: 69 IN | OXYGEN SATURATION: 98 % | BODY MASS INDEX: 33.33 KG/M2 | SYSTOLIC BLOOD PRESSURE: 115 MMHG | WEIGHT: 225 LBS | RESPIRATION RATE: 16 BRPM | DIASTOLIC BLOOD PRESSURE: 72 MMHG | TEMPERATURE: 97.7 F

## 2022-02-18 PROCEDURE — 94010 BREATHING CAPACITY TEST: CPT

## 2022-02-18 PROCEDURE — 94727 GAS DIL/WSHOT DETER LNG VOL: CPT

## 2022-02-18 PROCEDURE — 71046 X-RAY EXAM CHEST 2 VIEWS: CPT

## 2022-02-18 PROCEDURE — ZZZZZ: CPT

## 2022-02-18 PROCEDURE — 99214 OFFICE O/P EST MOD 30 MIN: CPT | Mod: 25

## 2022-02-18 PROCEDURE — 94729 DIFFUSING CAPACITY: CPT

## 2022-02-18 NOTE — PROCEDURE
[FreeTextEntry1] : PFT 2/28/22\par Very mild OAD\par TLC 86 %  normal lung volumes\par ERV 25 % sec overweight\par  DLCO  88 %\par HGB 11.9\par \par Chest x-ray PA lateral February 18, 2022\par Normal cardiac size\par Left midlung zone intrapulmonary lymph node versus Jaxon S\par Not exclude tiny faint subcentimeter density right lower lobe\par No parenchymal infiltrates\par No pneumothorax\par No pleural effusion\par Roxie mediastinum unremarkable\par Soft tissue bony structures unremarkable\par Comparison to January 29, 2021 chest x-ray no interval change\par Right lower lobe most consistent with a subcentimeter granuloma\par \par PFT 11/12/21\par Normal flow rates\par  lung volumes nl\par  DLCO 86 % pred WNL\par HGB 11.1\par \par PFT 8/20/21\par flow rates nl\par  mild OAD\par TLC 94\par RES IS INC SP CONDUCTANCE DEC\par DLCO 88 %\par interval improvement of flow rates\par \par PFT 5/12/21\par mILD oad\par lUNG vOLUMES NL\par DLCO 80 % wnl\par hgb 11.9\par \par PFT 2/22/21\par Normal  flow rates\par  minimal OAD\par Normal  lung volumes borderline airtrapping\par  Normal DLCO  84 %  pred\par  HGB 11.7\par \par Chest x-ray PA lateral January 29, 2021\par Normal cardiac size\par Clear lungs\par No parenchymal infiltrates pleural effusions with dominant pulmonary nodules left well-circumscribed intrapulmonary lymph node\par Tiny nodularity noted right lower lung zone most consistent with granuloma dating back  2 years\par \par PFT Nov 23 2020\par  Mild OAD\par normal lung volumes\par Normal DLCO\par  HGB 11.4 \par Interval improvement at Flow Rates \par \par NIOX 69 PPD October 22, 2020\par PFT October 22, 2020\par Mild reduction flow rates with a mild obstructive ventilatory impairment\par No significant response to bronchodilator at the FEV1 measuring 7%\par Borderline response at the small airways of 15%\par Lung volumes demonstrate normal total lung capacity\par Air trapping with RV/TLC ratio 38% predicted and a decreased ERV likely secondary to patient increased abdominal girth\par Diffusion normal 100% predicted.\par Hemoglobin 11.4\par Data compared to August 11, 2020 does demonstrate some mild reduction at the flow rates\par \par DEXA 9/17/20\par normal study\par \par 8/11/2020\par NIOX 50\par PFT\par  spirometry normal\par 30% response to bronchodilator at the FEV1\par Normal lung volumes\par Diffusion normal 92% predicted.\par Positive bronchodilator response\par \par Chest x-ray PA lateral March 9, 2020\par Normal cardiac size\par Clear lung fields without parenchymal infiltrates pleural effusions dominant pulmonary nodules\par Tissue bony structures normal\par Roxie mediastinum normal\par Impression clear lungs\par \par  CPAP DATA data   February 18, 2022\par Study through 2000 1622 data\par usage 70 %\par Hours > 6\par Auto CPAp 6 - 18 cm h20\par AHI 2.1\par \par PFIZER completed\par \par Echocardiogram August 14, 2021\par Mild concentric LVH\par Impaired LV relaxation with normal filling pressure\par Myxomatous mitral valve\par Mild to moderate mitral regurgitation\par Mild tricuspid regurgitation\par Normal global LV systolic function\par Stress test treadmill August 14, 2021\par No reported ischemic changes\par No arrhythmia noted\par \par \par

## 2022-02-18 NOTE — PHYSICAL EXAM
[General Appearance - Well Developed] : well developed [Normal Appearance] : normal appearance [Well Groomed] : well groomed [General Appearance - Well Nourished] : well nourished [No Deformities] : no deformities [General Appearance - In No Acute Distress] : no acute distress [Normal Conjunctiva] : the conjunctiva exhibited no abnormalities [Eyelids - No Xanthelasma] : the eyelids demonstrated no xanthelasmas [Normal Oropharynx] : normal oropharynx [II] : II [Neck Appearance] : the appearance of the neck was normal [Neck Cervical Mass (___cm)] : no neck mass was observed [Jugular Venous Distention Increased] : there was no jugular-venous distention [Thyroid Diffuse Enlargement] : the thyroid was not enlarged [Heart Rate And Rhythm] : heart rate and rhythm were normal [Heart Sounds] : normal S1 and S2 [Murmurs] : no murmurs present [Arterial Pulses Normal] : the arterial pulses were normal [Edema] : no peripheral edema present [Veins - Varicosity Changes] : no varicosital changes were noted in the lower extremities [Respiration, Rhythm And Depth] : normal respiratory rhythm and effort [Auscultation Breath Sounds / Voice Sounds] : lungs were clear to auscultation bilaterally [Exaggerated Use Of Accessory Muscles For Inspiration] : no accessory muscle use [Chest Palpation] : palpation of the chest revealed no abnormalities [Lungs Percussion] : the lungs were normal to percussion [Abdomen Soft] : soft [Abdomen Tenderness] : non-tender [Abdomen Mass (___ Cm)] : no abdominal mass palpated [Abnormal Walk] : normal gait [Gait - Sufficient For Exercise Testing] : the gait was sufficient for exercise testing [Nail Clubbing] : no clubbing of the fingernails [Cyanosis, Localized] : no localized cyanosis [Petechial Hemorrhages (___cm)] : no petechial hemorrhages [Skin Color & Pigmentation] : normal skin color and pigmentation [] : no rash [No Venous Stasis] : no venous stasis [Skin Lesions] : no skin lesions [No Skin Ulcers] : no skin ulcer [No Xanthoma] : no  xanthoma was observed [Deep Tendon Reflexes (DTR)] : deep tendon reflexes were 2+ and symmetric [Sensation] : the sensory exam was normal to light touch and pinprick [No Focal Deficits] : no focal deficits [Oriented To Time, Place, And Person] : oriented to person, place, and time [Impaired Insight] : insight and judgment were intact [Affect] : the affect was normal [FreeTextEntry1] : non icteric

## 2022-02-18 NOTE — HISTORY OF PRESENT ILLNESS
[Stable] : are stable [None] : ~He/She~ has no significant interval events [Difficulty Breathing During Exertion] : stable dyspnea on exertion [Feelings Of Weakness On Exertion] : stable exercise intolerance [Cough] : denies coughing [Wheezing] : denies wheezing [Regional Soft Tissue Swelling Both Lower Extremities] : denies lower extremity edema [Chest Pain Or Discomfort] : denies chest pain [Fever] : denies fever [Obstructive Sleep Apnea] : obstructive sleep apnea [Date: ___] : Date of most recent diagnostic polysomnogram: [unfilled] [AHI: ___ per hour] : Apnea-hypopnea index:  [unfilled] per hour [Wt Gain ___ Lbs] : no recent weight gain [Wt Loss ___ Lbs] : no recent weight loss [Oxygen] : the patient uses no supplemental oxygen [Nocturnal Oxygen] : The patient does not use nocturnal oxygen [FreeTextEntry1] : sinus sxs\par ? allergy component\par \par no active asthma sxs\par  issue with CPAP use \par does use So Clean but was not compliant - advised not to use\par no viral\par  no travel\par non sp  lumbar back pain\par Notes some component  left hip  pain with numbness  radiation left\par HEME Noted

## 2022-02-18 NOTE — REVIEW OF SYSTEMS
Labs/EKG/Imaging Studies [Sinus Problems] : sinus problems [As Noted in HPI] : as noted in HPI [Hypertension] : ~T hypertension [Back Pain] : ~T back pain [Anemia] : anemia [Negative] : Pulmonary Hypertension [Ear Disturbance] : no ear disturbance [Nasal Congestion] : no nasal congestion [Epistaxis] : no nosebleeds [Postnasal Drip] : no postnasal drip [Sore Throat] : no sore throat [Dry Mouth] : no dry mouth [PND] : no PND [Orthopnea] : no orthopnea [Palpitations] : no palpitations [Edema] : ~T edema was not present [Claudication] : no intermittent claudication [Leg Cramps] : no leg cramps Labs/EKG/Imaging Studies/Medications [FreeTextEntry5] : colonoscopy 2016 [FreeTextEntry6] : management Dr Abraham Escalante

## 2022-02-22 ENCOUNTER — RX RENEWAL (OUTPATIENT)
Age: 57
End: 2022-02-22

## 2022-02-25 ENCOUNTER — APPOINTMENT (OUTPATIENT)
Dept: PULMONOLOGY | Facility: CLINIC | Age: 57
End: 2022-02-25
Payer: COMMERCIAL

## 2022-02-25 ENCOUNTER — LABORATORY RESULT (OUTPATIENT)
Age: 57
End: 2022-02-25

## 2022-02-25 PROCEDURE — 95800 SLP STDY UNATTENDED: CPT

## 2022-02-27 ENCOUNTER — RX RENEWAL (OUTPATIENT)
Age: 57
End: 2022-02-27

## 2022-02-27 PROCEDURE — 95800 SLP STDY UNATTENDED: CPT

## 2022-03-14 ENCOUNTER — APPOINTMENT (OUTPATIENT)
Dept: PULMONOLOGY | Facility: CLINIC | Age: 57
End: 2022-03-14
Payer: COMMERCIAL

## 2022-03-14 VITALS
RESPIRATION RATE: 16 BRPM | SYSTOLIC BLOOD PRESSURE: 121 MMHG | TEMPERATURE: 97.1 F | OXYGEN SATURATION: 98 % | HEART RATE: 74 BPM | DIASTOLIC BLOOD PRESSURE: 84 MMHG

## 2022-03-14 PROCEDURE — 99214 OFFICE O/P EST MOD 30 MIN: CPT

## 2022-03-14 NOTE — PROCEDURE
[FreeTextEntry1] : Sleep study February 25 February 27, 2022\par Severe obstructive sleep apnea positional component\par AHI 33\par Follow-up post treatment with overnight oximetry \par  \par PFT 2/28/22\par Very mild OAD\par TLC 86 %  normal lung volumes\par ERV 25 % sec overweight\par  DLCO  88 %\par HGB 11.9\par \par Chest x-ray PA lateral February 18, 2022\par Normal cardiac size\par Left midlung zone intrapulmonary lymph node versus end on vascular\par Not exclude tiny faint subcentimeter density right lower lobe\par No parenchymal infiltrates\par No pneumothorax\par No pleural effusion\par Roxie mediastinum unremarkable\par Soft tissue bony structures unremarkable\par Comparison to January 29, 2021 chest x-ray no interval change\par Right lower lobe most consistent with a subcentimeter granuloma\par \par PFT 11/12/21\par Normal flow rates\par  lung volumes nl\par  DLCO 86 % pred WNL\par HGB 11.1\par \par PFT 8/20/21\par flow rates nl\par  mild OAD\par TLC 94\par RES IS INC SP CONDUCTANCE DEC\par DLCO 88 %\par interval improvement of flow rates\par \par PFT 5/12/21\par mILD oad\par lUNG vOLUMES NL\par DLCO 80 % wnl\par hgb 11.9\par \par PFT 2/22/21\par Normal  flow rates\par  minimal OAD\par Normal  lung volumes borderline airtrapping\par  Normal DLCO  84 %  pred\par  HGB 11.7\par \par Chest x-ray PA lateral January 29, 2021\par Normal cardiac size\par Clear lungs\par No parenchymal infiltrates pleural effusions with dominant pulmonary nodules left well-circumscribed intrapulmonary lymph node\par Tiny nodularity noted right lower lung zone most consistent with granuloma dating back  2 years\par \par PFT Nov 23 2020\par  Mild OAD\par normal lung volumes\par Normal DLCO\par  HGB 11.4 \par Interval improvement at Flow Rates \par \par NIOX 69 PPD October 22, 2020\par PFT October 22, 2020\par Mild reduction flow rates with a mild obstructive ventilatory impairment\par No significant response to bronchodilator at the FEV1 measuring 7%\par Borderline response at the small airways of 15%\par Lung volumes demonstrate normal total lung capacity\par Air trapping with RV/TLC ratio 38% predicted and a decreased ERV likely secondary to patient increased abdominal girth\par Diffusion normal 100% predicted.\par Hemoglobin 11.4\par Data compared to August 11, 2020 does demonstrate some mild reduction at the flow rates\par \par DEXA 9/17/20\par normal study\par \par 8/11/2020\par NIOX 50\par PFT\par  spirometry normal\par 30% response to bronchodilator at the FEV1\par Normal lung volumes\par Diffusion normal 92% predicted.\par Positive bronchodilator response\par \par Chest x-ray PA lateral March 9, 2020\par Normal cardiac size\par Clear lung fields without parenchymal infiltrates pleural effusions dominant pulmonary nodules\par Tissue bony structures normal\par Roxie mediastinum normal\par Impression clear lungs\par \par  CPAP DATA data    3/14/22\par usage 93 %\par Hours > 6\par Auto CPAp 6 - 18 cm h20\par AHI 1.9\par \par PFIZER completed vaccine\par \par Echocardiogram August 14, 2021\par Mild concentric LVH\par Impaired LV relaxation with normal filling pressure\par Myxomatous mitral valve\par Mild to moderate mitral regurgitation\par Mild tricuspid regurgitation\par Normal global LV systolic function\par Stress test treadmill August 14, 2021\par No reported ischemic changes\par No arrhythmia noted\par \par \par

## 2022-03-14 NOTE — HISTORY OF PRESENT ILLNESS
[Stable] : are stable [None] : ~He/She~ has no significant interval events [Difficulty Breathing During Exertion] : stable dyspnea on exertion [Feelings Of Weakness On Exertion] : stable exercise intolerance [Cough] : denies coughing [Wheezing] : denies wheezing [Regional Soft Tissue Swelling Both Lower Extremities] : denies lower extremity edema [Chest Pain Or Discomfort] : denies chest pain [Fever] : denies fever [Obstructive Sleep Apnea] : obstructive sleep apnea [Date: ___] : Date of most recent diagnostic polysomnogram: [unfilled] [AHI: ___ per hour] : Apnea-hypopnea index:  [unfilled] per hour [Wt Gain ___ Lbs] : no recent weight gain [Wt Loss ___ Lbs] : no recent weight loss [Oxygen] : the patient uses no supplemental oxygen [Nocturnal Oxygen] : The patient does not use nocturnal oxygen [FreeTextEntry1] : sinus sxs\par ? allergy component better controlled with Rx nasal + generic singulair\par \par no active asthma sxs\par no inc use ERNESTO\par  issue with CPAP use \par does use So Clean but was not compliant - advised not to use\par no viral\par  no travel\par non sp  lumbar back pain\par Notes some component  left hip  pain with numbness  radiation left\par HEME Noted

## 2022-03-14 NOTE — DISCUSSION/SUMMARY
[FreeTextEntry1] : Mild  decline Pulmonary Physiology\par LEONARD with significant improvement of CPAP usage- addressed risks /benefits- discussed  benefits re usage- ORDER new RESMED\par Mild asthma need daily compliance with use Symbicort and rinse\par sinus disease\par Off Spiriva\par    Hypertension better controlled\par Proteinuria with history mild renal insufficiency management PMD\par Heme  noted\par continue singulair 10 mg QD\par Symbicort  and prn Proair as noted\par LEONARD- AUTO CPAP  6 - 18 cm H20 RESMED\par  Proair before swimming 2 puffs\par \par Anemia w/u and noted stool negative heme renal\par ADDED  spiriva 2.5 mcg 2 puffs QD- HOLD\par \par Recommendation patient to consider bariatric surgery- did  not proceed\par Do believe her weight contributes to her sensation of shortness of breath\par Referral Cyrus Valiente MD- on hold\par \par f/u mammogram per PMD management\par  Colonoscopy- completed with Dr Birch\par f/u RENAl for proteinuria- Med f/u\par cardiology ECHO  4/14/21 noted\par \par Patient compliant with CPAP therapy.  Continue present settings.  Patient with demonstrated clinical benefit with daytime and nocturnal symptomatology improvement.\par \par DEXA  Sept 2022\par Note has PCP for  overall  management and  will  defer other  medical  issues  to  her  care\par  \par

## 2022-03-15 ENCOUNTER — RX RENEWAL (OUTPATIENT)
Age: 57
End: 2022-03-15

## 2022-03-17 ENCOUNTER — TRANSCRIPTION ENCOUNTER (OUTPATIENT)
Age: 57
End: 2022-03-17

## 2022-03-17 ENCOUNTER — APPOINTMENT (OUTPATIENT)
Dept: PULMONOLOGY | Facility: CLINIC | Age: 57
End: 2022-03-17
Payer: COMMERCIAL

## 2022-03-17 DIAGNOSIS — U07.1 COVID-19: ICD-10-CM

## 2022-03-17 LAB — SARS-COV-2 N GENE NPH QL NAA+PROBE: DETECTED

## 2022-03-17 PROCEDURE — 99443: CPT

## 2022-03-18 ENCOUNTER — OUTPATIENT (OUTPATIENT)
Dept: OUTPATIENT SERVICES | Facility: HOSPITAL | Age: 57
LOS: 1 days | End: 2022-03-18

## 2022-03-18 ENCOUNTER — NON-APPOINTMENT (OUTPATIENT)
Age: 57
End: 2022-03-18

## 2022-03-18 ENCOUNTER — APPOINTMENT (OUTPATIENT)
Dept: DISASTER EMERGENCY | Facility: HOSPITAL | Age: 57
End: 2022-03-18

## 2022-03-18 VITALS
SYSTOLIC BLOOD PRESSURE: 131 MMHG | TEMPERATURE: 98 F | RESPIRATION RATE: 16 BRPM | OXYGEN SATURATION: 100 % | DIASTOLIC BLOOD PRESSURE: 86 MMHG | HEART RATE: 64 BPM

## 2022-03-18 VITALS
HEIGHT: 69 IN | OXYGEN SATURATION: 99 % | DIASTOLIC BLOOD PRESSURE: 70 MMHG | HEART RATE: 72 BPM | SYSTOLIC BLOOD PRESSURE: 119 MMHG | RESPIRATION RATE: 16 BRPM | TEMPERATURE: 99 F | WEIGHT: 225.09 LBS

## 2022-03-18 DIAGNOSIS — U07.1 COVID-19: ICD-10-CM

## 2022-03-18 LAB — DEPRECATED D DIMER PPP IA-ACNC: <150 NG/ML DDU

## 2022-03-18 RX ORDER — SOTROVIMAB 62.5 MG/ML
500 INJECTION, SOLUTION, CONCENTRATE INTRAVENOUS ONCE
Refills: 0 | Status: COMPLETED | OUTPATIENT
Start: 2022-03-18 | End: 2022-03-18

## 2022-03-18 RX ORDER — SODIUM CHLORIDE 9 MG/ML
250 INJECTION INTRAMUSCULAR; INTRAVENOUS; SUBCUTANEOUS
Refills: 0 | Status: DISCONTINUED | OUTPATIENT
Start: 2022-03-18 | End: 2022-04-01

## 2022-03-18 RX ADMIN — SOTROVIMAB 116 MILLIGRAM(S): 62.5 INJECTION, SOLUTION, CONCENTRATE INTRAVENOUS at 07:55

## 2022-03-18 RX ADMIN — SODIUM CHLORIDE 100 MILLILITER(S): 9 INJECTION INTRAMUSCULAR; INTRAVENOUS; SUBCUTANEOUS at 07:56

## 2022-03-18 NOTE — MONOCLONAL ANTIBODY INFUSION - EXAM
Exam/findings:  T(C): 37.1 (03-18-22 @ 07:53), Max: 37.1 (03-18-22 @ 07:44)  HR: 69 (03-18-22 @ 07:53) (69 - 72)  BP: 117/80 (03-18-22 @ 07:53) (117/80 - 119/70)  RR: 16 (03-18-22 @ 07:53) (16 - 16)  SpO2: 98% (03-18-22 @ 07:53) (98% - 99%)    PE:   Appearance: NAD	  HEENT:  NC/AT  Cardiovascular:  No edema  Respiratory: no use of accessory muscles  Gastrointestinal:  non-distended   Skin: warm and dry  Neurologic: Non-focal  Extremities: Normal range of motion

## 2022-03-18 NOTE — MONOCLONAL ANTIBODY INFUSION - ASSESSMENT AND PLAN
CC: Monoclonal Antibody Infusion/COVID 19 Positive  56y Female with asthma, CKD, and recent dx of COVID 19+ who presents today for elective Sotrovimab infusion. Patient has been screened and was deemed to be a candidate.    Symptoms/ Criteria  Date of Symptom Onset: 3/15/22  Symptoms: headache  Date of Positive COVID PCR: 3/16/22  Risk Profile includes: asthma     PMHx:  Infection due to severe acute respiratory syndrome coronavirus 2 (SARS-CoV-2)    ASSESSMENT:  Pt is COVID positive and symptomatic who was referred to the infusion center for elective Monoclonal antibody infusion (Sotrovimab).    PLAN:  - Infusion procedure explained to patient.  1. FDA has authorized emergency use Sotrovimab, which is not an FDA-approved biological product.  2. The patient or patient's caregiver has the option to accept or refuse administration of Sotrovimab.   3. The significant known and potential risks and benefits of Sotrovimab and the extent to which such risks and benefits are unknown.  4. Information on available alternative treatments and risks and benefits of those alternatives.  5.  Patient verbalized understanding of plan and agrees to treatment.  6.  All questions answered.  - Consent for monoclonal antibody infusion obtained.   - Infuse Sotrovimab 500mg IV over 30 minutes.  - Observe patient for one hour post infusion, and then if stable discharge home with oupt follow up as planned by PMD.    POST INFUSION ASSESSMENT:   Patient completed infusion, and monitored x 1 hour post-infusion with no adverse reactions noted, remained hemodynamically stable.    - Patient tolerated infusion well; denies complaints of chest pain/SOB/dizziness/palpitations.   - VSS for discharge home.  - D/C instructions given/ fact sheet included.  - Patient advised to follow-up with PCP.   - Patient was instructed to self-isolate and use infection control measures (e.g wear mask, isolate, social distance, avoid sharing personal items, clean and disinfect "high touch" surfaces, and frequent handwashing according to the CDC guidelines.   - The patient was informed on what symptoms to be aware of for the next couple of days, and if there are any issues to call the 24/7 clinical call center. Patient was instructed to follow up with primary care provider as needed.    DISCHARGE at 9:30AM.

## 2022-03-19 LAB
ALBUMIN SERPL ELPH-MCNC: 4.6 G/DL
ALP BLD-CCNC: 78 U/L
ALT SERPL-CCNC: 29 U/L
ANION GAP SERPL CALC-SCNC: 15 MMOL/L
AST SERPL-CCNC: 26 U/L
BASOPHILS # BLD AUTO: 0.02 K/UL
BASOPHILS NFR BLD AUTO: 0.5 %
BILIRUB SERPL-MCNC: 0.4 MG/DL
BUN SERPL-MCNC: 10 MG/DL
CALCIUM SERPL-MCNC: 10 MG/DL
CHLORIDE SERPL-SCNC: 102 MMOL/L
CO2 SERPL-SCNC: 25 MMOL/L
CREAT SERPL-MCNC: 1.09 MG/DL
CRP SERPL-MCNC: 5 MG/L
EGFR: 60 ML/MIN/1.73M2
EOSINOPHIL # BLD AUTO: 0.04 K/UL
EOSINOPHIL NFR BLD AUTO: 0.9 %
FERRITIN SERPL-MCNC: 328 NG/ML
GLUCOSE SERPL-MCNC: 91 MG/DL
HCT VFR BLD CALC: 37.1 %
HGB BLD-MCNC: 11.5 G/DL
IMM GRANULOCYTES NFR BLD AUTO: 0.2 %
LYMPHOCYTES # BLD AUTO: 1.76 K/UL
LYMPHOCYTES NFR BLD AUTO: 40.6 %
MAN DIFF?: NORMAL
MCHC RBC-ENTMCNC: 29 PG
MCHC RBC-ENTMCNC: 31 GM/DL
MCV RBC AUTO: 93.7 FL
MONOCYTES # BLD AUTO: 0.34 K/UL
MONOCYTES NFR BLD AUTO: 7.9 %
NEUTROPHILS # BLD AUTO: 2.16 K/UL
NEUTROPHILS NFR BLD AUTO: 49.9 %
PLATELET # BLD AUTO: 263 K/UL
POTASSIUM SERPL-SCNC: 3.5 MMOL/L
PROCALCITONIN SERPL-MCNC: 0.08 NG/ML
PROT SERPL-MCNC: 7.7 G/DL
RBC # BLD: 3.96 M/UL
RBC # FLD: 12.2 %
SODIUM SERPL-SCNC: 142 MMOL/L
WBC # FLD AUTO: 4.33 K/UL

## 2022-04-04 ENCOUNTER — APPOINTMENT (OUTPATIENT)
Dept: MAMMOGRAPHY | Facility: IMAGING CENTER | Age: 57
End: 2022-04-04
Payer: COMMERCIAL

## 2022-04-04 ENCOUNTER — OUTPATIENT (OUTPATIENT)
Dept: OUTPATIENT SERVICES | Facility: HOSPITAL | Age: 57
LOS: 1 days | End: 2022-04-04
Payer: COMMERCIAL

## 2022-04-04 ENCOUNTER — APPOINTMENT (OUTPATIENT)
Dept: RADIOLOGY | Facility: IMAGING CENTER | Age: 57
End: 2022-04-04
Payer: COMMERCIAL

## 2022-04-04 ENCOUNTER — APPOINTMENT (OUTPATIENT)
Dept: ULTRASOUND IMAGING | Facility: IMAGING CENTER | Age: 57
End: 2022-04-04
Payer: COMMERCIAL

## 2022-04-04 DIAGNOSIS — Z00.8 ENCOUNTER FOR OTHER GENERAL EXAMINATION: ICD-10-CM

## 2022-04-04 PROCEDURE — 77063 BREAST TOMOSYNTHESIS BI: CPT | Mod: 26

## 2022-04-04 PROCEDURE — 76641 ULTRASOUND BREAST COMPLETE: CPT | Mod: 26,50

## 2022-04-04 PROCEDURE — 77067 SCR MAMMO BI INCL CAD: CPT | Mod: 26

## 2022-04-04 PROCEDURE — 77080 DXA BONE DENSITY AXIAL: CPT | Mod: 26

## 2022-04-04 PROCEDURE — 76641 ULTRASOUND BREAST COMPLETE: CPT

## 2022-04-04 PROCEDURE — 77080 DXA BONE DENSITY AXIAL: CPT

## 2022-04-04 PROCEDURE — 77067 SCR MAMMO BI INCL CAD: CPT

## 2022-04-04 PROCEDURE — 77063 BREAST TOMOSYNTHESIS BI: CPT

## 2022-04-08 ENCOUNTER — APPOINTMENT (OUTPATIENT)
Dept: PULMONOLOGY | Facility: CLINIC | Age: 57
End: 2022-04-08

## 2022-04-11 PROBLEM — Z11.59 SCREENING FOR VIRAL DISEASE: Status: ACTIVE | Noted: 2020-06-16

## 2022-04-13 ENCOUNTER — RX RENEWAL (OUTPATIENT)
Age: 57
End: 2022-04-13

## 2022-04-29 ENCOUNTER — APPOINTMENT (OUTPATIENT)
Dept: PULMONOLOGY | Facility: CLINIC | Age: 57
End: 2022-04-29

## 2022-05-02 ENCOUNTER — RX RENEWAL (OUTPATIENT)
Age: 57
End: 2022-05-02

## 2022-05-10 ENCOUNTER — NON-APPOINTMENT (OUTPATIENT)
Age: 57
End: 2022-05-10

## 2022-05-10 ENCOUNTER — APPOINTMENT (OUTPATIENT)
Dept: CARDIOLOGY | Facility: CLINIC | Age: 57
End: 2022-05-10
Payer: COMMERCIAL

## 2022-05-10 VITALS
WEIGHT: 226 LBS | DIASTOLIC BLOOD PRESSURE: 71 MMHG | HEART RATE: 73 BPM | SYSTOLIC BLOOD PRESSURE: 118 MMHG | OXYGEN SATURATION: 98 % | BODY MASS INDEX: 40.04 KG/M2 | HEIGHT: 63 IN | TEMPERATURE: 98.3 F

## 2022-05-10 PROCEDURE — 93000 ELECTROCARDIOGRAM COMPLETE: CPT

## 2022-05-10 PROCEDURE — 0: CUSTOM

## 2022-05-10 PROCEDURE — 99214 OFFICE O/P EST MOD 30 MIN: CPT

## 2022-05-10 RX ORDER — FAMOTIDINE 40 MG/1
40 TABLET, FILM COATED ORAL
Qty: 30 | Refills: 0 | Status: DISCONTINUED | COMMUNITY
Start: 2022-03-17 | End: 2022-05-10

## 2022-05-10 NOTE — HISTORY OF PRESENT ILLNESS
[FreeTextEntry1] : This is a 56 year old lady with a PMH of HTN, Asthma, Anemia,  LEONARD, and Chronic Back pain who presents today for routine follow-up. Patient states that she is feeling well today and has no issue sor concerns. Patient denies chest pain, shortness of breath, palpitations, dizziness, vision changes, n/v, abdominal pain, changes in bowel/bladder habits,  or appetite. pt with resolved chest discomfort

## 2022-05-10 NOTE — REVIEW OF SYSTEMS
[Nasal Congestion] : nasal congestion [Sinus Problems] : sinus problems [As Noted in HPI] : as noted in HPI [Hypertension] : ~T hypertension [Back Pain] : ~T back pain [Negative] : Pulmonary Hypertension [Ear Disturbance] : no ear disturbance [Epistaxis] : no nosebleeds [Postnasal Drip] : no postnasal drip [Sore Throat] : no sore throat [Dry Mouth] : no dry mouth [PND] : no PND [Orthopnea] : no orthopnea [Palpitations] : no palpitations [Edema] : ~T edema was not present [Claudication] : no intermittent claudication [Leg Cramps] : no leg cramps [FreeTextEntry5] : colonoscopy 2016 [FreeTextEntry6] : management Dr Abraham Escalante [address]

## 2022-05-10 NOTE — PHYSICAL EXAM
[Well Developed] : well developed [Well Nourished] : well nourished [No Acute Distress] : no acute distress [Normal Conjunctiva] : normal conjunctiva [Normal Venous Pressure] : normal venous pressure [No Carotid Bruit] : no carotid bruit [Normal S1, S2] : normal S1, S2 [No Rub] : no rub [No Gallop] : no gallop [Clear Lung Fields] : clear lung fields [Good Air Entry] : good air entry [No Respiratory Distress] : no respiratory distress  [Soft] : abdomen soft [Non Tender] : non-tender [No Masses/organomegaly] : no masses/organomegaly [Normal Bowel Sounds] : normal bowel sounds [Normal Gait] : normal gait [No Edema] : no edema [No Cyanosis] : no cyanosis [No Clubbing] : no clubbing [No Varicosities] : no varicosities [No Rash] : no rash [No Skin Lesions] : no skin lesions [Moves all extremities] : moves all extremities [No Focal Deficits] : no focal deficits [Normal Speech] : normal speech [Alert and Oriented] : alert and oriented [Normal memory] : normal memory [de-identified] : 1-2 /6 systolic murmrur apex

## 2022-05-13 ENCOUNTER — APPOINTMENT (OUTPATIENT)
Dept: PULMONOLOGY | Facility: CLINIC | Age: 57
End: 2022-05-13
Payer: COMMERCIAL

## 2022-05-13 ENCOUNTER — APPOINTMENT (OUTPATIENT)
Dept: CARDIOLOGY | Facility: CLINIC | Age: 57
End: 2022-05-13
Payer: COMMERCIAL

## 2022-05-13 VITALS
OXYGEN SATURATION: 98 % | HEIGHT: 69 IN | HEART RATE: 74 BPM | RESPIRATION RATE: 18 BRPM | WEIGHT: 225 LBS | TEMPERATURE: 98.2 F | SYSTOLIC BLOOD PRESSURE: 105 MMHG | BODY MASS INDEX: 33.33 KG/M2 | DIASTOLIC BLOOD PRESSURE: 68 MMHG

## 2022-05-13 PROCEDURE — 99214 OFFICE O/P EST MOD 30 MIN: CPT | Mod: 25

## 2022-05-13 PROCEDURE — 94727 GAS DIL/WSHOT DETER LNG VOL: CPT

## 2022-05-13 PROCEDURE — 94010 BREATHING CAPACITY TEST: CPT

## 2022-05-13 PROCEDURE — ZZZZZ: CPT

## 2022-05-13 PROCEDURE — 94729 DIFFUSING CAPACITY: CPT

## 2022-05-13 PROCEDURE — 93306 TTE W/DOPPLER COMPLETE: CPT

## 2022-05-13 NOTE — PHYSICAL EXAM
[General Appearance - Well Developed] : well developed [Normal Appearance] : normal appearance [Well Groomed] : well groomed [General Appearance - Well Nourished] : well nourished [No Deformities] : no deformities [General Appearance - In No Acute Distress] : no acute distress [Normal Conjunctiva] : the conjunctiva exhibited no abnormalities [Eyelids - No Xanthelasma] : the eyelids demonstrated no xanthelasmas [Normal Oropharynx] : normal oropharynx [II] : II [Neck Appearance] : the appearance of the neck was normal [Neck Cervical Mass (___cm)] : no neck mass was observed [Jugular Venous Distention Increased] : there was no jugular-venous distention [Thyroid Diffuse Enlargement] : the thyroid was not enlarged [Heart Rate And Rhythm] : heart rate and rhythm were normal [Heart Sounds] : normal S1 and S2 [Murmurs] : no murmurs present [Arterial Pulses Normal] : the arterial pulses were normal [Edema] : no peripheral edema present [Veins - Varicosity Changes] : no varicosital changes were noted in the lower extremities [Respiration, Rhythm And Depth] : normal respiratory rhythm and effort [Exaggerated Use Of Accessory Muscles For Inspiration] : no accessory muscle use [Auscultation Breath Sounds / Voice Sounds] : lungs were clear to auscultation bilaterally [Lungs Percussion] : the lungs were normal to percussion [Chest Palpation] : palpation of the chest revealed no abnormalities [Abdomen Soft] : soft [Abdomen Tenderness] : non-tender [Abdomen Mass (___ Cm)] : no abdominal mass palpated [Abnormal Walk] : normal gait [Gait - Sufficient For Exercise Testing] : the gait was sufficient for exercise testing [Nail Clubbing] : no clubbing of the fingernails [Cyanosis, Localized] : no localized cyanosis [Petechial Hemorrhages (___cm)] : no petechial hemorrhages [Skin Color & Pigmentation] : normal skin color and pigmentation [] : no rash [No Venous Stasis] : no venous stasis [Skin Lesions] : no skin lesions [No Skin Ulcers] : no skin ulcer [No Xanthoma] : no  xanthoma was observed [Deep Tendon Reflexes (DTR)] : deep tendon reflexes were 2+ and symmetric [Sensation] : the sensory exam was normal to light touch and pinprick [No Focal Deficits] : no focal deficits [Oriented To Time, Place, And Person] : oriented to person, place, and time [Impaired Insight] : insight and judgment were intact [Affect] : the affect was normal [FreeTextEntry1] : non icteric

## 2022-05-13 NOTE — DISCUSSION/SUMMARY
[FreeTextEntry1] : Improvement Pulmonary Physiology\par LEONARD with significant improvement of CPAP usage- addressed risks /benefits- discussed  benefits re  new RESMED\par Mild asthma need daily compliance with use Symbicort and rinse\par sinus disease\par Off Spiriva\par    Hypertension better controlled\par Proteinuria with history mild renal insufficiency management PMD\par Heme  noted\par continue singulair 10 mg QD\par Symbicort  and prn Proair as noted\par LEONARD- AUTO CPAP  6 - 18 cm H20 RESMED\par  Proair before swimming 2 puffs\par \par Anemia w/u and noted stool negative heme renal\par ADDED  spiriva 2.5 mcg 2 puffs QD- HOLD\par \par Recommendation patient to consider bariatric surgery- did  not proceed\par Do believe her weight contributes to her sensation of shortness of breath\par Referral Cyrus Valiente MD- on hold\par \par f/u mammogram per PMD management\par  Colonoscopy- completed with Dr Birch\par f/u RENAl for proteinuria- Med f/u\par cardiology ECHO  4/14/21 noted\par \par Patient compliant with CPAP therapy.  Continue present settings.  Patient with demonstrated clinical benefit with daytime and nocturnal symptomatology improvement.\par \par DEXA  Sept 2022\par Note has PCP for  overall  management and  will  defer other  medical  issues  to  her  care\par  \par

## 2022-05-13 NOTE — PROCEDURE
[FreeTextEntry1] : PFT 5/13/22\par Flow rates nl\par Lung Volumes nl\par DLCO 109 %\par HGB 11.2\par \par Sleep study February 25 February 27, 2022\par Severe obstructive sleep apnea positional component\par AHI 33\par Follow-up post treatment with overnight oximetry \par  \par PFT 2/28/22\par Very mild OAD\par TLC 86 %  normal lung volumes\par ERV 25 % sec overweight\par  DLCO  88 %\par HGB 11.9\par \par Chest x-ray PA lateral February 18, 2022\par Normal cardiac size\par Left midlung zone intrapulmonary lymph node versus end on vascular\par Not exclude tiny faint subcentimeter density right lower lobe\par No parenchymal infiltrates\par No pneumothorax\par No pleural effusion\par Roxie mediastinum unremarkable\par Soft tissue bony structures unremarkable\par Comparison to January 29, 2021 chest x-ray no interval change\par Right lower lobe most consistent with a subcentimeter granuloma\par \par PFT 11/12/21\par Normal flow rates\par  lung volumes nl\par  DLCO 86 % pred WNL\par HGB 11.1\par \par PFT 8/20/21\par flow rates nl\par  mild OAD\par TLC 94\par RES IS INC SP CONDUCTANCE DEC\par DLCO 88 %\par interval improvement of flow rates\par \par PFT 5/12/21\par mILD oad\par lUNG vOLUMES NL\par DLCO 80 % wnl\par hgb 11.9\par \par PFT 2/22/21\par Normal  flow rates\par  minimal OAD\par Normal  lung volumes borderline airtrapping\par  Normal DLCO  84 %  pred\par  HGB 11.7\par \par Chest x-ray PA lateral January 29, 2021\par Normal cardiac size\par Clear lungs\par No parenchymal infiltrates pleural effusions with dominant pulmonary nodules left well-circumscribed intrapulmonary lymph node\par Tiny nodularity noted right lower lung zone most consistent with granuloma dating back  2 years\par \par PFT Nov 23 2020\par  Mild OAD\par normal lung volumes\par Normal DLCO\par  HGB 11.4 \par Interval improvement at Flow Rates \par \par NIOX 69 PPD October 22, 2020\par PFT October 22, 2020\par Mild reduction flow rates with a mild obstructive ventilatory impairment\par No significant response to bronchodilator at the FEV1 measuring 7%\par Borderline response at the small airways of 15%\par Lung volumes demonstrate normal total lung capacity\par Air trapping with RV/TLC ratio 38% predicted and a decreased ERV likely secondary to patient increased abdominal girth\par Diffusion normal 100% predicted.\par Hemoglobin 11.4\par Data compared to August 11, 2020 does demonstrate some mild reduction at the flow rates\par \par DEXA 9/17/20\par normal study\par \par 8/11/2020\par NIOX 50\par PFT\par  spirometry normal\par 30% response to bronchodilator at the FEV1\par Normal lung volumes\par Diffusion normal 92% predicted.\par Positive bronchodilator response\par \par Chest x-ray PA lateral March 9, 2020\par Normal cardiac size\par Clear lung fields without parenchymal infiltrates pleural effusions dominant pulmonary nodules\par Tissue bony structures normal\par Roxie mediastinum normal\par Impression clear lungs\par \par  CPAP DATA data    3/14/22\par usage 93 %\par Hours > 6\par Auto CPAp 6 - 18 cm h20\par AHI 1.9\par \par PFIZER completed vaccine\par \par Echocardiogram August 14, 2021\par Mild concentric LVH\par Impaired LV relaxation with normal filling pressure\par Myxomatous mitral valve\par Mild to moderate mitral regurgitation\par Mild tricuspid regurgitation\par Normal global LV systolic function\par Stress test treadmill August 14, 2021\par No reported ischemic changes\par No arrhythmia noted\par \par \par

## 2022-06-01 ENCOUNTER — OUTPATIENT (OUTPATIENT)
Dept: OUTPATIENT SERVICES | Facility: HOSPITAL | Age: 57
LOS: 1 days | Discharge: HOME | End: 2022-06-01

## 2022-06-01 DIAGNOSIS — D64.9 ANEMIA, UNSPECIFIED: ICD-10-CM

## 2022-06-07 ENCOUNTER — RESULT REVIEW (OUTPATIENT)
Age: 57
End: 2022-06-07

## 2022-06-07 ENCOUNTER — APPOINTMENT (OUTPATIENT)
Dept: HEMATOLOGY ONCOLOGY | Facility: CLINIC | Age: 57
End: 2022-06-07
Payer: COMMERCIAL

## 2022-06-07 VITALS
OXYGEN SATURATION: 100 % | RESPIRATION RATE: 16 BRPM | WEIGHT: 231.48 LBS | DIASTOLIC BLOOD PRESSURE: 85 MMHG | SYSTOLIC BLOOD PRESSURE: 124 MMHG | BODY MASS INDEX: 34.18 KG/M2 | HEART RATE: 70 BPM | TEMPERATURE: 97.8 F

## 2022-06-07 DIAGNOSIS — Z87.448 PERSONAL HISTORY OF OTHER DISEASES OF URINARY SYSTEM: ICD-10-CM

## 2022-06-07 LAB
ALBUMIN SERPL ELPH-MCNC: 4.6 G/DL
ALP BLD-CCNC: 70 U/L
ALT SERPL-CCNC: 17 U/L
ANION GAP SERPL CALC-SCNC: 14 MMOL/L
AST SERPL-CCNC: 20 U/L
BASOPHILS # BLD AUTO: 0.03 K/UL — SIGNIFICANT CHANGE UP (ref 0–0.2)
BASOPHILS NFR BLD AUTO: 0.6 % — SIGNIFICANT CHANGE UP (ref 0–2)
BILIRUB SERPL-MCNC: 0.4 MG/DL
BUN SERPL-MCNC: 13 MG/DL
CALCIUM SERPL-MCNC: 9.6 MG/DL
CHLORIDE SERPL-SCNC: 100 MMOL/L
CO2 SERPL-SCNC: 25 MMOL/L
CREAT SERPL-MCNC: 0.88 MG/DL
EGFR: 77 ML/MIN/1.73M2
EOSINOPHIL # BLD AUTO: 0.08 K/UL — SIGNIFICANT CHANGE UP (ref 0–0.5)
EOSINOPHIL NFR BLD AUTO: 1.7 % — SIGNIFICANT CHANGE UP (ref 0–6)
FERRITIN SERPL-MCNC: 273 NG/ML
FOLATE SERPL-MCNC: 18.9 NG/ML
GLUCOSE SERPL-MCNC: 102 MG/DL
HCT VFR BLD CALC: 33.1 % — LOW (ref 34.5–45)
HGB BLD-MCNC: 10.5 G/DL — LOW (ref 11.5–15.5)
IMM GRANULOCYTES NFR BLD AUTO: 0.2 % — SIGNIFICANT CHANGE UP (ref 0–1.5)
IRON SATN MFR SERPL: 48 %
IRON SERPL-MCNC: 135 UG/DL
LYMPHOCYTES # BLD AUTO: 1.39 K/UL — SIGNIFICANT CHANGE UP (ref 1–3.3)
LYMPHOCYTES # BLD AUTO: 29.6 % — SIGNIFICANT CHANGE UP (ref 13–44)
MCHC RBC-ENTMCNC: 29.9 PG — SIGNIFICANT CHANGE UP (ref 27–34)
MCHC RBC-ENTMCNC: 31.7 G/DL — LOW (ref 32–36)
MCV RBC AUTO: 94.3 FL — SIGNIFICANT CHANGE UP (ref 80–100)
MONOCYTES # BLD AUTO: 0.43 K/UL — SIGNIFICANT CHANGE UP (ref 0–0.9)
MONOCYTES NFR BLD AUTO: 9.1 % — SIGNIFICANT CHANGE UP (ref 2–14)
NEUTROPHILS # BLD AUTO: 2.76 K/UL — SIGNIFICANT CHANGE UP (ref 1.8–7.4)
NEUTROPHILS NFR BLD AUTO: 58.8 % — SIGNIFICANT CHANGE UP (ref 43–77)
NRBC # BLD: 0 /100 WBCS — SIGNIFICANT CHANGE UP (ref 0–0)
PLATELET # BLD AUTO: 231 K/UL — SIGNIFICANT CHANGE UP (ref 150–400)
POTASSIUM SERPL-SCNC: 3.6 MMOL/L
PROT SERPL-MCNC: 7.2 G/DL
RBC # BLD: 3.51 M/UL — LOW (ref 3.8–5.2)
RBC # FLD: 12.4 % — SIGNIFICANT CHANGE UP (ref 10.3–14.5)
SODIUM SERPL-SCNC: 139 MMOL/L
TIBC SERPL-MCNC: 282 UG/DL
UIBC SERPL-MCNC: 147 UG/DL
VIT B12 SERPL-MCNC: 396 PG/ML
WBC # BLD: 4.7 K/UL — SIGNIFICANT CHANGE UP (ref 3.8–10.5)
WBC # FLD AUTO: 4.7 K/UL — SIGNIFICANT CHANGE UP (ref 3.8–10.5)

## 2022-06-07 PROCEDURE — 99213 OFFICE O/P EST LOW 20 MIN: CPT

## 2022-06-15 NOTE — ASSESSMENT
[FreeTextEntry1] : 56yo F with PMH of asthma, morbid obesity,  sleep apnea on CPAP, HTN, proteinuria. Patient was referred for evaluation and management of anemia. \par \par Laboratory studies CBC 6/24/19 : WBC 4.4, Hb 11.7, RBC 3.87, MCV 92.5, Hct 35.9%, RDW 11.9%, PLTs 257.  \par \par 6/24/19 iron studies were normal\par Ferritin 230\par Vitamin B 12 429\par Folate 16\par Light chain kappa 2.89 mildly elevated but kappa/ lambda ratio were normal. \par Immunofixation was negative for monoclonal band. \par \par 6/7/22\par CBC: WBC 4.70 K/uL,  HGB 10.5 g/dL,  HCT 33.1 %,  PLTS 231 K/uL. Results reviewed with patient\par CMP, Ferritin, B12/Folate, Iron studies pending\par \par -Back pain- following with chiropractor \par \par - Sleep apnea: following with pulmonary. \par \par - Obesity : trying to loss weight with weight training, yoga and diet modifications. \par \par -HTN: on Olmesartan. \par \par RTC  6 months\par \par Case and management discussed with MD Rodriguez.\par

## 2022-06-15 NOTE — HISTORY OF PRESENT ILLNESS
[de-identified] : 58 yo F with PMH of asthma, morbid obesity,  sleep apnea on CPAP, HTN, proteinuria. Patient was referred for evaluation and management of anemia. \par \par Laboratory studies from November, 2018 c/w folate 13, B 12 355.\par Iron studies ferritin 225, iron 127, TIBC 294, % saturation iron 43%.\par Creatine 0.78. \par \par Patient has history of iron deficiency anemia secondary to menorrhagia; s/p hysterectomy due to uterine fibroids.\par \par Patient has been followed by Dr Bruno Breen for history of renal insufficiency in 2005,  and proteinuria which has been stable. Patient developed BIJAN and proteinuria  in the setting of NSAID use and streptococcal infection. Pt. with acute post streptococcal GN in 2005.\par \par Patient denies feeling dizzy, lightheadedness, palpitations, recently diagnosed with asthma within the last year complaints of sob with exertion. 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 \par \par  [de-identified] : Patient presents here today for a follow up. She is doing well. Chronic lower back pain is being tx by chiropractor. \par Denies any fever/chills, night sweats, fatigue, CP, palpitations, bleeding, melena, hematochezia. \par \par No other changes in medical, surgical or social history since 12/2/2021. \par \par \par \par \par

## 2022-06-15 NOTE — REVIEW OF SYSTEMS
[Fatigue] : no fatigue [SOB on Exertion] : no shortness of breath during exertion [Confused] : no confusion [Dizziness] : no dizziness [Fainting] : no fainting [Difficulty Walking] : no difficulty walking [FreeTextEntry9] : chronic low back pain pain radiates to the right thigh  [de-identified] : intermittent numbness and tingling to fingertips for over 6 months.

## 2022-06-24 ENCOUNTER — APPOINTMENT (OUTPATIENT)
Dept: INFUSION THERAPY | Facility: HOSPITAL | Age: 57
End: 2022-06-24

## 2022-06-24 ENCOUNTER — APPOINTMENT (OUTPATIENT)
Dept: PULMONOLOGY | Facility: CLINIC | Age: 57
End: 2022-06-24
Payer: COMMERCIAL

## 2022-06-24 VITALS
OXYGEN SATURATION: 99 % | HEART RATE: 78 BPM | TEMPERATURE: 97.6 F | DIASTOLIC BLOOD PRESSURE: 86 MMHG | SYSTOLIC BLOOD PRESSURE: 130 MMHG

## 2022-06-24 PROCEDURE — 99213 OFFICE O/P EST LOW 20 MIN: CPT

## 2022-06-24 NOTE — HISTORY OF PRESENT ILLNESS
[Stable] : are stable [None] : ~He/She~ has no significant interval events [Difficulty Breathing During Exertion] : stable dyspnea on exertion [Feelings Of Weakness On Exertion] : stable exercise intolerance [Cough] : denies coughing [Wheezing] : denies wheezing [Regional Soft Tissue Swelling Both Lower Extremities] : denies lower extremity edema [Chest Pain Or Discomfort] : denies chest pain [Fever] : denies fever [Obstructive Sleep Apnea] : obstructive sleep apnea [Date: ___] : Date of most recent diagnostic polysomnogram: [unfilled] [AHI: ___ per hour] : Apnea-hypopnea index:  [unfilled] per hour [Wt Gain ___ Lbs] : no recent weight gain [Wt Loss ___ Lbs] : no recent weight loss [Oxygen] : the patient uses no supplemental oxygen [Nocturnal Oxygen] : The patient does not use nocturnal oxygen [FreeTextEntry1] : sinus sxs chronic\par COVID Rapid AG neg\par ? allergy component better controlled with Rx nasal + generic singulair\par HEME\par 56yo F with PMH of asthma, morbid obesity, sleep apnea on CPAP, HTN, proteinuria. Patient was referred for evaluation and management of anemia. \par \par Laboratory studies CBC 6/24/19 : WBC 4.4, Hb 11.7, RBC 3.87, MCV 92.5, Hct 35.9%, RDW 11.9%, PLTs 257. \par \par 6/24/19 iron studies were normal\par Ferritin 230\par Vitamin B 12 429\par Folate 16\par Light chain kappa 2.89 mildly elevated but kappa/ lambda ratio were normal. \par Immunofixation was negative for monoclonal ban\par \par no active asthma sxs\par no inc use ERNESTO\par  issue with CPAP use \par does use So Clean but was not compliant - advised not to use\par no viral\par  no travel\par non sp  lumbar back pain\par Notes some component  left hip  pain with numbness  radiation left\par HEME Noted

## 2022-06-24 NOTE — DISCUSSION/SUMMARY
[FreeTextEntry1] : Improvement Pulmonary Physiology\par LEONARD with significant improvement of CPAP usage- addressed risks /benefits- discussed  benefits re  new RESMED\par Mild asthma need daily compliance with use Symbicort and rinse\par sinus disease\par Off Spiriva\par    Hypertension better controlled\par Proteinuria with history mild renal insufficiency management PMD\par Heme  noted\par continue singulair 10 mg QD\par Symbicort  and prn Proair as noted\par LEONARD- AUTO CPAP  6 - 18 cm H20 RESMED\par  Proair before swimming 2 puffs\par \par Anemia w/u and noted stool negative heme renal\par ADDED  spiriva 2.5 mcg 2 puffs QD- HOLD\par \par Recommendation patient to consider bariatric surgery- did  not proceed\par Do believe her weight contributes to her sensation of shortness of breath\par Referral Cyrus Valiente MD- on hold\par \par f/u mammogram per PMD management\par  Colonoscopy- completed with Dr Birch\par f/u RENAl for proteinuria- Med f/u\par cardiology ECHO  4/14/21 noted\par \par Patient compliant with CPAP therapy.  Continue present settings.  Patient with demonstrated clinical benefit with daytime and nocturnal symptomatology improvement.\par \par DEXA  Sept 2022\par Note has PCP for  overall  management and  will  defer other  medical  issues  to  her  care\par CPAP management visit\par  \par

## 2022-06-24 NOTE — PROCEDURE
[FreeTextEntry1] : CPAP_ RESMED 6/23/22\par pos  airleak\par \par PFT 5/13/22\par Flow rates nl\par Lung Volumes nl\par DLCO 109 %\par HGB 11.2\par \par Sleep study February 25 February 27, 2022\par Severe obstructive sleep apnea positional component\par AHI 33\par Follow-up post treatment with overnight oximetry \par  \par PFT 2/28/22\par Very mild OAD\par TLC 86 %  normal lung volumes\par ERV 25 % sec overweight\par  DLCO  88 %\par HGB 11.9\par \par Chest x-ray PA lateral February 18, 2022\par Normal cardiac size\par Left midlung zone intrapulmonary lymph node versus end on vascular\par Not exclude tiny faint subcentimeter density right lower lobe\par No parenchymal infiltrates\par No pneumothorax\par No pleural effusion\par Roxie mediastinum unremarkable\par Soft tissue bony structures unremarkable\par Comparison to January 29, 2021 chest x-ray no interval change\par Right lower lobe most consistent with a subcentimeter granuloma\par \par PFT 11/12/21\par Normal flow rates\par  lung volumes nl\par  DLCO 86 % pred WNL\par HGB 11.1\par \par PFT 8/20/21\par flow rates nl\par  mild OAD\par TLC 94\par RES IS INC SP CONDUCTANCE DEC\par DLCO 88 %\par interval improvement of flow rates\par \par PFT 5/12/21\par mILD oad\par lUNG vOLUMES NL\par DLCO 80 % wnl\par hgb 11.9\par \par PFT 2/22/21\par Normal  flow rates\par  minimal OAD\par Normal  lung volumes borderline airtrapping\par  Normal DLCO  84 %  pred\par  HGB 11.7\par \par Chest x-ray PA lateral January 29, 2021\par Normal cardiac size\par Clear lungs\par No parenchymal infiltrates pleural effusions with dominant pulmonary nodules left well-circumscribed intrapulmonary lymph node\par Tiny nodularity noted right lower lung zone most consistent with granuloma dating back  2 years\par \par PFT Nov 23 2020\par  Mild OAD\par normal lung volumes\par Normal DLCO\par  HGB 11.4 \par Interval improvement at Flow Rates \par \par NIOX 69 PPD October 22, 2020\par PFT October 22, 2020\par Mild reduction flow rates with a mild obstructive ventilatory impairment\par No significant response to bronchodilator at the FEV1 measuring 7%\par Borderline response at the small airways of 15%\par Lung volumes demonstrate normal total lung capacity\par Air trapping with RV/TLC ratio 38% predicted and a decreased ERV likely secondary to patient increased abdominal girth\par Diffusion normal 100% predicted.\par Hemoglobin 11.4\par Data compared to August 11, 2020 does demonstrate some mild reduction at the flow rates\par \par DEXA 9/17/20\par normal study\par \par 8/11/2020\par NIOX 50\par PFT\par  spirometry normal\par 30% response to bronchodilator at the FEV1\par Normal lung volumes\par Diffusion normal 92% predicted.\par Positive bronchodilator response\par \par Chest x-ray PA lateral March 9, 2020\par Normal cardiac size\par Clear lung fields without parenchymal infiltrates pleural effusions dominant pulmonary nodules\par Tissue bony structures normal\par Roxie mediastinum normal\par Impression clear lungs\par \par  CPAP DATA data    3/14/22\par usage 93 %\par Hours > 6\par Auto CPAp 6 - 18 cm h20\par AHI 1.9\par \par PFIZER completed vaccine\par \par Echocardiogram August 14, 2021\par Mild concentric LVH\par Impaired LV relaxation with normal filling pressure\par Myxomatous mitral valve\par Mild to moderate mitral regurgitation\par Mild tricuspid regurgitation\par Normal global LV systolic function\par Stress test treadmill August 14, 2021\par No reported ischemic changes\par No arrhythmia noted\par \par \par

## 2022-06-27 DIAGNOSIS — E53.8 DEFICIENCY OF OTHER SPECIFIED B GROUP VITAMINS: ICD-10-CM

## 2022-07-01 ENCOUNTER — APPOINTMENT (OUTPATIENT)
Dept: INFUSION THERAPY | Facility: HOSPITAL | Age: 57
End: 2022-07-01

## 2022-07-05 ENCOUNTER — NON-APPOINTMENT (OUTPATIENT)
Age: 57
End: 2022-07-05

## 2022-07-06 ENCOUNTER — APPOINTMENT (OUTPATIENT)
Dept: PULMONOLOGY | Facility: CLINIC | Age: 57
End: 2022-07-06

## 2022-07-06 VITALS
TEMPERATURE: 97.9 F | DIASTOLIC BLOOD PRESSURE: 69 MMHG | SYSTOLIC BLOOD PRESSURE: 105 MMHG | OXYGEN SATURATION: 99 % | RESPIRATION RATE: 16 BRPM | HEART RATE: 81 BPM

## 2022-07-06 PROCEDURE — 94660 CPAP INITIATION&MGMT: CPT

## 2022-07-06 NOTE — PROCEDURE
[FreeTextEntry1] : PT WAS SEEN IN OFFICE, VITALS ARE STABLE. PT HAS BEEN HAVING LEAKS W HER CURRENT FFM. I SUGGESTED SHE TRY AN AIRFIT F20 FFM. WE DIDNT HAVE SAMPLE BUT I SENT JEFFERSON A TASK TO SEND A N RX TO HER [employer] [facility] ([employer] AS PER THE PT). AFTER SHOWING HER PICTURES AND VIDEO, SHE STATED THAT SHE THINKS SHE WILL HAVE AN EASIER TIME WITH THIS NEW MASK.

## 2022-07-08 ENCOUNTER — APPOINTMENT (OUTPATIENT)
Dept: INFUSION THERAPY | Facility: HOSPITAL | Age: 57
End: 2022-07-08

## 2022-07-15 ENCOUNTER — APPOINTMENT (OUTPATIENT)
Dept: INFUSION THERAPY | Facility: HOSPITAL | Age: 57
End: 2022-07-15

## 2022-07-17 ENCOUNTER — RX RENEWAL (OUTPATIENT)
Age: 57
End: 2022-07-17

## 2022-07-22 ENCOUNTER — APPOINTMENT (OUTPATIENT)
Dept: INFUSION THERAPY | Facility: HOSPITAL | Age: 57
End: 2022-07-22

## 2022-07-22 DIAGNOSIS — Z51.89 ENCOUNTER FOR OTHER SPECIFIED AFTERCARE: ICD-10-CM

## 2022-07-29 ENCOUNTER — APPOINTMENT (OUTPATIENT)
Dept: INFUSION THERAPY | Facility: HOSPITAL | Age: 57
End: 2022-07-29

## 2022-08-05 ENCOUNTER — RX RENEWAL (OUTPATIENT)
Age: 57
End: 2022-08-05

## 2022-08-22 ENCOUNTER — RX RENEWAL (OUTPATIENT)
Age: 57
End: 2022-08-22

## 2022-08-25 ENCOUNTER — APPOINTMENT (OUTPATIENT)
Dept: PULMONOLOGY | Facility: CLINIC | Age: 57
End: 2022-08-25

## 2022-08-25 ENCOUNTER — APPOINTMENT (OUTPATIENT)
Dept: INTERNAL MEDICINE | Facility: CLINIC | Age: 57
End: 2022-08-25

## 2022-08-25 ENCOUNTER — NON-APPOINTMENT (OUTPATIENT)
Age: 57
End: 2022-08-25

## 2022-08-25 VITALS
SYSTOLIC BLOOD PRESSURE: 100 MMHG | OXYGEN SATURATION: 98 % | HEIGHT: 69 IN | WEIGHT: 231 LBS | HEART RATE: 82 BPM | DIASTOLIC BLOOD PRESSURE: 80 MMHG | BODY MASS INDEX: 34.21 KG/M2 | TEMPERATURE: 98.5 F

## 2022-08-25 VITALS
HEART RATE: 78 BPM | OXYGEN SATURATION: 98 % | TEMPERATURE: 97.3 F | SYSTOLIC BLOOD PRESSURE: 118 MMHG | DIASTOLIC BLOOD PRESSURE: 84 MMHG

## 2022-08-25 DIAGNOSIS — R20.8 OTHER DISTURBANCES OF SKIN SENSATION: ICD-10-CM

## 2022-08-25 LAB
ALBUMIN SERPL ELPH-MCNC: 4.4 G/DL
ALP BLD-CCNC: 82 U/L
ALT SERPL-CCNC: 18 U/L
ANION GAP SERPL CALC-SCNC: 15 MMOL/L
AST SERPL-CCNC: 18 U/L
BASOPHILS # BLD AUTO: 0.03 K/UL
BASOPHILS NFR BLD AUTO: 0.6 %
BILIRUB SERPL-MCNC: 0.2 MG/DL
BUN SERPL-MCNC: 13 MG/DL
CALCIUM SERPL-MCNC: 10.1 MG/DL
CHLORIDE SERPL-SCNC: 99 MMOL/L
CO2 SERPL-SCNC: 24 MMOL/L
CREAT SERPL-MCNC: 0.88 MG/DL
CRP SERPL-MCNC: 6 MG/L
EGFR: 77 ML/MIN/1.73M2
EOSINOPHIL # BLD AUTO: 0.12 K/UL
EOSINOPHIL NFR BLD AUTO: 2.2 %
ESTIMATED AVERAGE GLUCOSE: 111 MG/DL
FERRITIN SERPL-MCNC: 254 NG/ML
FOLATE SERPL-MCNC: 17.1 NG/ML
GLUCOSE SERPL-MCNC: 155 MG/DL
HBA1C MFR BLD HPLC: 5.5 %
HCT VFR BLD CALC: 36.4 %
HGB BLD-MCNC: 11.6 G/DL
IMM GRANULOCYTES NFR BLD AUTO: 0 %
LYMPHOCYTES # BLD AUTO: 2.29 K/UL
LYMPHOCYTES NFR BLD AUTO: 42.9 %
MAN DIFF?: NORMAL
MCHC RBC-ENTMCNC: 29.8 PG
MCHC RBC-ENTMCNC: 31.9 GM/DL
MCV RBC AUTO: 93.6 FL
MONOCYTES # BLD AUTO: 0.34 K/UL
MONOCYTES NFR BLD AUTO: 6.4 %
NEUTROPHILS # BLD AUTO: 2.56 K/UL
NEUTROPHILS NFR BLD AUTO: 47.9 %
NT-PROBNP SERPL-MCNC: 12 PG/ML
PLATELET # BLD AUTO: 284 K/UL
POTASSIUM SERPL-SCNC: 3.2 MMOL/L
PROT SERPL-MCNC: 8 G/DL
RBC # BLD: 3.89 M/UL
RBC # FLD: 12.5 %
SODIUM SERPL-SCNC: 139 MMOL/L
T4 FREE SERPL-MCNC: 1 NG/DL
TSH SERPL-ACNC: 0.86 UIU/ML
VIT B12 SERPL-MCNC: 986 PG/ML
WBC # FLD AUTO: 5.34 K/UL

## 2022-08-25 PROCEDURE — 93000 ELECTROCARDIOGRAM COMPLETE: CPT

## 2022-08-25 PROCEDURE — 99214 OFFICE O/P EST MOD 30 MIN: CPT | Mod: 25

## 2022-08-25 PROCEDURE — 94010 BREATHING CAPACITY TEST: CPT

## 2022-08-25 NOTE — DISCUSSION/SUMMARY
[FreeTextEntry1] : Improvement Pulmonary Physiology\par LEONARD with significant improvement of CPAP usage- addressed risks /benefits- discussed  benefits re  new RESMED\par Mild asthma need daily compliance with use Symbicort and rinse\par sinus disease\par Off Spiriva\par    Hypertension better controlled\par Proteinuria with history mild renal insufficiency management PMD\par Heme  noted\par continue singulair 10 mg QD\par Symbicort  and prn Proair as noted\par LEONARD- AUTO CPAP  6 - 18 cm H20 RESMED\par  Proair before swimming 2 puffs\par Notify of any wheezing.  Notify if rescue inhaler is needed greater than 2-3 times in any week.  Avoid known triggers.\par Anemia w/u and noted stool negative heme renal\par ADDED  spiriva 2.5 mcg 2 puffs QD- HOLD\par \par Recommendation patient to consider bariatric surgery- did  not proceed\par Do believe her weight contributes to her sensation of shortness of breath\par Referral Cyrus Valiente MD- on hold\par \par f/u mammogram per PMD management\par  Colonoscopy- completed with Dr Birch\par f/u RENAl for proteinuria- Med f/u\par cardiology ECHO  4/14/21 noted\par \par Patient compliant with CPAP therapy.  Continue present settings.  Patient with demonstrated clinical benefit with daytime and nocturnal symptomatology improvement.\par \par DEXA  Sept 2022\par Note has PCP for  overall  management and  will  defer other  medical  issues  to  her  care\par CPAP management visit\par  \par

## 2022-08-25 NOTE — PHYSICAL EXAM
[No Acute Distress] : no acute distress [Well Nourished] : well nourished [Well Developed] : well developed [Well-Appearing] : well-appearing [Normal Sclera/Conjunctiva] : normal sclera/conjunctiva [Normal Outer Ear/Nose] : the outer ears and nose were normal in appearance [Normal Oropharynx] : the oropharynx was normal [No JVD] : no jugular venous distention [Supple] : supple [No Respiratory Distress] : no respiratory distress  [No Accessory Muscle Use] : no accessory muscle use [Clear to Auscultation] : lungs were clear to auscultation bilaterally [Normal Rate] : normal rate  [Regular Rhythm] : with a regular rhythm [Normal S1, S2] : normal S1 and S2 [No Murmur] : no murmur heard [Pedal Pulses Present] : the pedal pulses are present [No Edema] : there was no peripheral edema [No Extremity Clubbing/Cyanosis] : no extremity clubbing/cyanosis [Soft] : abdomen soft [Non Tender] : non-tender [Non-distended] : non-distended [Normal Bowel Sounds] : normal bowel sounds [No CVA Tenderness] : no CVA  tenderness [No Spinal Tenderness] : no spinal tenderness [No Joint Swelling] : no joint swelling [Grossly Normal Strength/Tone] : grossly normal strength/tone [No Rash] : no rash [Coordination Grossly Intact] : coordination grossly intact [No Focal Deficits] : no focal deficits [Normal Gait] : normal gait [Normal Affect] : the affect was normal [Alert and Oriented x3] : oriented to person, place, and time [FreeTextEntry1] : visual inspection with no gross abnormalities, did not perform internal exam  [de-identified] : 5/5 strength in all extremites

## 2022-08-25 NOTE — PROCEDURE
[FreeTextEntry1] : Javy 8/25/22\par flow rates nl\par stable FEV1\par \par CPAP data compliance 8/24/22\par 97 %\par hours >7\par  AUTO CPAP 6-18 cm H20\par AHI 2.5\par pod occ airleak\par \par CPAP_ RESMED 6/23/22\par pos  airleak\par \par PFT 5/13/22\par Flow rates nl\par Lung Volumes nl\par DLCO 109 %\par HGB 11.2\par \par Sleep study February 25 February 27, 2022\par Severe obstructive sleep apnea positional component\par AHI 33\par Follow-up post treatment with overnight oximetry \par  \par PFT 2/28/22\par Very mild OAD\par TLC 86 %  normal lung volumes\par ERV 25 % sec overweight\par  DLCO  88 %\par HGB 11.9\par \par Chest x-ray PA lateral February 18, 2022\par Normal cardiac size\par Left midlung zone intrapulmonary lymph node versus end on vascular\par Not exclude tiny faint subcentimeter density right lower lobe\par No parenchymal infiltrates\par No pneumothorax\par No pleural effusion\par Roxie mediastinum unremarkable\par Soft tissue bony structures unremarkable\par Comparison to January 29, 2021 chest x-ray no interval change\par Right lower lobe most consistent with a subcentimeter granuloma\par \par PFT 11/12/21\par Normal flow rates\par  lung volumes nl\par  DLCO 86 % pred WNL\par HGB 11.1\par \par PFT 8/20/21\par flow rates nl\par  mild OAD\par TLC 94\par RES IS INC SP CONDUCTANCE DEC\par DLCO 88 %\par interval improvement of flow rates\par \par PFT 5/12/21\par mILD oad\par lUNG vOLUMES NL\par DLCO 80 % wnl\par hgb 11.9\par \par PFT 2/22/21\par Normal  flow rates\par  minimal OAD\par Normal  lung volumes borderline airtrapping\par  Normal DLCO  84 %  pred\par  HGB 11.7\par \par Chest x-ray PA lateral January 29, 2021\par Normal cardiac size\par Clear lungs\par No parenchymal infiltrates pleural effusions with dominant pulmonary nodules left well-circumscribed intrapulmonary lymph node\par Tiny nodularity noted right lower lung zone most consistent with granuloma dating back  2 years\par \par PFT Nov 23 2020\par  Mild OAD\par normal lung volumes\par Normal DLCO\par  HGB 11.4 \par Interval improvement at Flow Rates \par \par NIOX 69 PPD October 22, 2020\par PFT October 22, 2020\par Mild reduction flow rates with a mild obstructive ventilatory impairment\par No significant response to bronchodilator at the FEV1 measuring 7%\par Borderline response at the small airways of 15%\par Lung volumes demonstrate normal total lung capacity\par Air trapping with RV/TLC ratio 38% predicted and a decreased ERV likely secondary to patient increased abdominal girth\par Diffusion normal 100% predicted.\par Hemoglobin 11.4\par Data compared to August 11, 2020 does demonstrate some mild reduction at the flow rates\par \par DEXA 9/17/20\par normal study\par \par 8/11/2020\par NIOX 50\par PFT\par  spirometry normal\par 30% response to bronchodilator at the FEV1\par Normal lung volumes\par Diffusion normal 92% predicted.\par Positive bronchodilator response\par \par Chest x-ray PA lateral March 9, 2020\par Normal cardiac size\par Clear lung fields without parenchymal infiltrates pleural effusions dominant pulmonary nodules\par Tissue bony structures normal\par Roxie mediastinum normal\par Impression clear lungs\par \par  CPAP DATA data    3/14/22\par usage 93 %\par Hours > 6\par Auto CPAp 6 - 18 cm h20\par AHI 1.9\par \par PFIZER completed vaccine\par \par Echocardiogram August 14, 2021\par Mild concentric LVH\par Impaired LV relaxation with normal filling pressure\par Myxomatous mitral valve\par Mild to moderate mitral regurgitation\par Mild tricuspid regurgitation\par Normal global LV systolic function\par Stress test treadmill August 14, 2021\par No reported ischemic changes\par No arrhythmia noted\par \par \par

## 2022-08-25 NOTE — HISTORY OF PRESENT ILLNESS
[FreeTextEntry8] : CC: back pain and b/l UE and LE numbness tinlging \par JARETT INGRAM is a 57 year old female with pmh of HTN, Asthma, Anemia, LEONARD, and Chronic Back pain who presented to the office today for acute on chronic over back pain radiating to her b/l buttocks area. Also reports new numbness/tingling in b/l hands and  and both feets x 2-3 weeks.  Tried taking tylenol but helping minimally. Denies saddle anesthesia, LE/UE weakness, urinary or bowel incontinence. No recent or falls or trauma. Says she did physical therapy for alittle after seeing orthpedic spine surgeon Dr Escalante but stopped once her pain got better.  Denies f/c, n/v, dysuria, urgency.\par Patient feels well. No complaints. Denies chest pain, sob, alexander, dizziness, palpitations, LE swelling, syncope,  headache.\par \par Pt also with rectal burning which is acute on chronic symptom for her, said she used anusol suppositories in past which helped. Denies any bleeding, melena, hematochezia, anal intercourse etc \par

## 2022-08-25 NOTE — REVIEW OF SYSTEMS
[Back Pain] : back pain [Negative] : Heme/Lymph [Joint Pain] : no joint pain [Joint Stiffness] : no joint stiffness [Joint Swelling] : no joint swelling [Muscle Weakness] : no muscle weakness [Muscle Pain] : no muscle pain [Headache] : no headache [Dizziness] : no dizziness [Fainting] : no fainting [Confusion] : no confusion [Memory Loss] : no memory loss [Unsteady Walking] : no ataxia [de-identified] : +numbness/tingling of both hand and feet

## 2022-08-25 NOTE — HISTORY OF PRESENT ILLNESS
[Stable] : are stable [None] : ~He/She~ has no significant interval events [Difficulty Breathing During Exertion] : stable dyspnea on exertion [Feelings Of Weakness On Exertion] : stable exercise intolerance [Cough] : denies coughing [Wheezing] : denies wheezing [Regional Soft Tissue Swelling Both Lower Extremities] : denies lower extremity edema [Chest Pain Or Discomfort] : denies chest pain [Fever] : denies fever [Obstructive Sleep Apnea] : obstructive sleep apnea [Date: ___] : Date of most recent diagnostic polysomnogram: [unfilled] [AHI: ___ per hour] : Apnea-hypopnea index:  [unfilled] per hour [Wt Gain ___ Lbs] : no recent weight gain [Wt Loss ___ Lbs] : no recent weight loss [Oxygen] : the patient uses no supplemental oxygen [Nocturnal Oxygen] : The patient does not use nocturnal oxygen [FreeTextEntry1] : sinus sxs chronic\par COVID Rapid AG neg\par ? allergy component better controlled with Rx nasal + generic singulair\par HEME\par 58yo F with PMH of asthma, morbid obesity, sleep apnea on CPAP, HTN, proteinuria. Patient was referred for evaluation and management of anemia. \par \par Laboratory studies CBC 6/24/19 : WBC 4.4, Hb 11.7, RBC 3.87, MCV 92.5, Hct 35.9%, RDW 11.9%, PLTs 257. \par \par 6/24/19 iron studies were normal\par Ferritin 230\par Vitamin B 12 429\par Folate 16\par Light chain kappa 2.89 mildly elevated but kappa/ lambda ratio were normal. \par Immunofixation was negative for monoclonal ban\par \par no active asthma sxs\par no inc use ERNESTO\par  issue with CPAP use \par does use So Clean but was not compliant - advised not to use\par no viral\par  no travel\par non sp  lumbar back pain\par Notes some component  left hip  pain with numbness  radiation left\par HEME Noted

## 2022-08-26 ENCOUNTER — NON-APPOINTMENT (OUTPATIENT)
Age: 57
End: 2022-08-26

## 2022-08-26 DIAGNOSIS — E87.6 HYPOKALEMIA: ICD-10-CM

## 2022-08-26 LAB — BACTERIA UR CULT: NORMAL

## 2022-08-28 ENCOUNTER — RX RENEWAL (OUTPATIENT)
Age: 57
End: 2022-08-28

## 2022-08-28 LAB
APPEARANCE: CLEAR
BACTERIA: NEGATIVE
BILIRUBIN URINE: NEGATIVE
BLOOD URINE: NEGATIVE
COLOR: NORMAL
GLUCOSE QUALITATIVE U: NEGATIVE
HYALINE CASTS: 2 /LPF
KETONES URINE: NEGATIVE
LEUKOCYTE ESTERASE URINE: ABNORMAL
MICROSCOPIC-UA: NORMAL
NITRITE URINE: NEGATIVE
PH URINE: 5.5
PROTEIN URINE: NEGATIVE
RED BLOOD CELLS URINE: 4 /HPF
SPECIFIC GRAVITY URINE: 1.02
SQUAMOUS EPITHELIAL CELLS: 1 /HPF
UROBILINOGEN URINE: NORMAL
WHITE BLOOD CELLS URINE: 3 /HPF

## 2022-08-29 ENCOUNTER — RX RENEWAL (OUTPATIENT)
Age: 57
End: 2022-08-29

## 2022-08-31 LAB
ANION GAP SERPL CALC-SCNC: 16 MMOL/L
BUN SERPL-MCNC: 18 MG/DL
CALCIUM SERPL-MCNC: 10.2 MG/DL
CHLORIDE SERPL-SCNC: 103 MMOL/L
CO2 SERPL-SCNC: 26 MMOL/L
CREAT SERPL-MCNC: 0.94 MG/DL
EGFR: 71 ML/MIN/1.73M2
GLUCOSE SERPL-MCNC: 101 MG/DL
POTASSIUM SERPL-SCNC: 4.6 MMOL/L
SODIUM SERPL-SCNC: 145 MMOL/L

## 2022-09-07 ENCOUNTER — OUTPATIENT (OUTPATIENT)
Dept: OUTPATIENT SERVICES | Facility: HOSPITAL | Age: 57
LOS: 1 days | End: 2022-09-07
Payer: COMMERCIAL

## 2022-09-07 ENCOUNTER — APPOINTMENT (OUTPATIENT)
Dept: MRI IMAGING | Facility: CLINIC | Age: 57
End: 2022-09-07

## 2022-09-07 DIAGNOSIS — M54.50 LOW BACK PAIN, UNSPECIFIED: ICD-10-CM

## 2022-09-07 DIAGNOSIS — Z00.8 ENCOUNTER FOR OTHER GENERAL EXAMINATION: ICD-10-CM

## 2022-09-07 PROCEDURE — 72148 MRI LUMBAR SPINE W/O DYE: CPT

## 2022-09-07 PROCEDURE — 72148 MRI LUMBAR SPINE W/O DYE: CPT | Mod: 26

## 2022-09-12 ENCOUNTER — RX RENEWAL (OUTPATIENT)
Age: 57
End: 2022-09-12

## 2022-09-16 ENCOUNTER — APPOINTMENT (OUTPATIENT)
Dept: ORTHOPEDIC SURGERY | Facility: CLINIC | Age: 57
End: 2022-09-16

## 2022-09-16 VITALS
BODY MASS INDEX: 33.33 KG/M2 | SYSTOLIC BLOOD PRESSURE: 113 MMHG | HEIGHT: 69 IN | HEART RATE: 94 BPM | DIASTOLIC BLOOD PRESSURE: 75 MMHG | WEIGHT: 225 LBS

## 2022-09-16 DIAGNOSIS — M51.36 OTHER INTERVERTEBRAL DISC DEGENERATION, LUMBAR REGION: ICD-10-CM

## 2022-09-16 PROCEDURE — 72110 X-RAY EXAM L-2 SPINE 4/>VWS: CPT

## 2022-09-16 PROCEDURE — 99205 OFFICE O/P NEW HI 60 MIN: CPT

## 2022-09-16 PROCEDURE — 99215 OFFICE O/P EST HI 40 MIN: CPT

## 2022-09-16 NOTE — PHYSICAL EXAM
[de-identified] : Lumbar Physical Exam\par \par Gait - Normal\par \par Station - Normal\par \par Sagittal balance - Normal\par \par Compensatory mechanism? - None\par \par Heel walk - Normal\par \par Toe walk - Normal\par \par Reflexes\par Patellar - normal\par Gastroc - normal\par Clonus - No\par \par Hip Exam - Normal\par \par Straight leg raise - none\par \par Pulses - 2+ dp/pt\par \par Range of motion - normal\par \par Sensation \par Sensation intact to light touch in L1, L2, L3, L4, L5 and S1 dermatomes bilaterally\par \par Motor\par 	IP	Quad	HS	TA	Gastroc	EHL\par Right	5/5	5/5	5/5	5/5	5/5	5/5\par Left	5/5	5/5	5/5	5/5	5/5	5/5 [de-identified] : Lumbar MRI reviewed\par No areas of critical central stenosis\par No areas of critical foraminal stenosis\par There is some 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

## 2022-09-16 NOTE — HISTORY OF PRESENT ILLNESS
[de-identified] : This is a 57-year-old female here today for evaluation of her low back pain symptoms.  She has been dealing with the symptoms for 2+ years.  She has tried extensive range of conservative management.  This has included physical therapy, analgesics but unfortunately her symptoms have persisted.  She denies any radicular pain.  She denies any saddle anesthesia or bowel bladder issues.  This pain does interfere with her quality life and her ability to perform her activities of daily living.

## 2022-09-16 NOTE — ASSESSMENT
[FreeTextEntry1] : I had a long discussion with the patient regards her treatment plan diagnosis.  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  Unfortunate she has tried and failed significant physical therapy.  She may actually be a candidate for the mainstay implant.  We did discuss this at length.  I discussed the pathophysiology of multifidus atrophy and how it may relate to her current symptoms.  We discussed risks of the procedure as well.  We will restart a new course of physical therapy to ensure she has maximized all her conservative options.  I will see her back in 6 weeks.  Patient did seem fairly interested in the reactiv8 implant.

## 2022-10-02 ENCOUNTER — NON-APPOINTMENT (OUTPATIENT)
Age: 57
End: 2022-10-02

## 2022-10-03 ENCOUNTER — LABORATORY RESULT (OUTPATIENT)
Age: 57
End: 2022-10-03

## 2022-10-03 ENCOUNTER — APPOINTMENT (OUTPATIENT)
Dept: GASTROENTEROLOGY | Facility: CLINIC | Age: 57
End: 2022-10-03

## 2022-10-03 VITALS
OXYGEN SATURATION: 99 % | BODY MASS INDEX: 35.25 KG/M2 | WEIGHT: 238 LBS | SYSTOLIC BLOOD PRESSURE: 110 MMHG | TEMPERATURE: 97 F | RESPIRATION RATE: 12 BRPM | HEIGHT: 69 IN | DIASTOLIC BLOOD PRESSURE: 80 MMHG | HEART RATE: 92 BPM

## 2022-10-03 DIAGNOSIS — K59.4 ANAL SPASM: ICD-10-CM

## 2022-10-03 DIAGNOSIS — K64.4 RESIDUAL HEMORRHOIDAL SKIN TAGS: ICD-10-CM

## 2022-10-03 DIAGNOSIS — M54.2 CERVICALGIA: ICD-10-CM

## 2022-10-03 DIAGNOSIS — R93.1 ABNORMAL FINDINGS ON DIAGNOSTIC IMAGING OF HEART AND CORONARY CIRCULATION: ICD-10-CM

## 2022-10-03 PROCEDURE — 99213 OFFICE O/P EST LOW 20 MIN: CPT

## 2022-10-03 PROCEDURE — 99203 OFFICE O/P NEW LOW 30 MIN: CPT

## 2022-10-03 NOTE — PHYSICAL EXAM

## 2022-10-03 NOTE — ASSESSMENT
[FreeTextEntry1] : 57-year-old female from [place] with longstanding bloating without clear relation to meals.  There is no significant GERD nausea or vomiting.  There is no change in bowel habits.  She has some mild anorectal discomfort which is improved with the recent suppositories that she is using.  Her last colonoscopy in 2020 was normal including the rectal area.  Prior to that she had a colonoscopy in 2016 which was normal as well with no hemorrhoids noted.  No saddle anesthesia and she has no dysuria.  Denies any NSAIDs.  She admits to a poor diet with fried foods and some dietary indiscretions.\par \par Imp:\par 1. Likely ext hemorrhoidal discomfort, vs proctalgia fugax given previous colonoscopy and improvement with suppositories\par 2. Bloating- r/o celiac, hpylori, gallstones\par 3. Obesity\par \par Plan:\par 1. Reviewed prudent diet\par 2. Sonogram of abdomen\par 3. Proctosol bid\par 4. Celiac screen and UBT\par 5. followup in 4 weeks \par

## 2022-10-03 NOTE — REVIEW OF SYSTEMS
[As Noted in HPI] : as noted in HPI [Negative] : Heme/Lymph [FreeTextEntry7] : bloating, rectal discomfort

## 2022-10-05 ENCOUNTER — TRANSCRIPTION ENCOUNTER (OUTPATIENT)
Age: 57
End: 2022-10-05

## 2022-10-05 LAB — UREA BREATH TEST QL: NEGATIVE

## 2022-10-06 ENCOUNTER — TRANSCRIPTION ENCOUNTER (OUTPATIENT)
Age: 57
End: 2022-10-06

## 2022-10-06 LAB — CELIACPAN: NORMAL

## 2022-10-10 ENCOUNTER — NON-APPOINTMENT (OUTPATIENT)
Age: 57
End: 2022-10-10

## 2022-10-10 ENCOUNTER — APPOINTMENT (OUTPATIENT)
Dept: PULMONOLOGY | Facility: CLINIC | Age: 57
End: 2022-10-10

## 2022-10-10 VITALS — HEART RATE: 70 BPM | OXYGEN SATURATION: 98 % | SYSTOLIC BLOOD PRESSURE: 111 MMHG | DIASTOLIC BLOOD PRESSURE: 73 MMHG

## 2022-10-10 DIAGNOSIS — Z13.820 ENCOUNTER FOR SCREENING FOR OSTEOPOROSIS: ICD-10-CM

## 2022-10-10 PROCEDURE — 99214 OFFICE O/P EST MOD 30 MIN: CPT | Mod: 25

## 2022-10-10 PROCEDURE — 90686 IIV4 VACC NO PRSV 0.5 ML IM: CPT

## 2022-10-10 PROCEDURE — 94060 EVALUATION OF WHEEZING: CPT

## 2022-10-10 PROCEDURE — G0008: CPT

## 2022-10-10 PROCEDURE — 77085 DXA BONE DENSITY AXL VRT FX: CPT

## 2022-10-10 RX ORDER — AMOXICILLIN 875 MG/1
875 TABLET, FILM COATED ORAL
Qty: 14 | Refills: 0 | Status: DISCONTINUED | COMMUNITY
Start: 2022-09-15

## 2022-10-10 NOTE — PROCEDURE
[FreeTextEntry1] : Javy 10/10/22\par flow rates nl  range\par  stable\par \par DEXA 10/10/22\par  Left femoral neck nl\par Total Left hip nl\par AP L1- L4 nl\par  normal study f/u 2 - 5 years\par \par CPAP data through October 9, 2022\par 87%\par AutoSet CPAP 6 to 15 cm H2O\par Greater than 7\par AHI 3.8\par \par Javy 8/25/22\par flow rates nl\par stable FEV1\par \par CPAP data compliance 8/24/22\par 97 %\par hours >7\par  AUTO CPAP 6-18 cm H20\par AHI 2.5\par pod occ airleak\par \par CPAP_ RESMED 6/23/22\par pos  airleak\par \par PFT 5/13/22\par Flow rates nl\par Lung Volumes nl\par DLCO 109 %\par HGB 11.2\par \par Sleep study February 25 February 27, 2022\par Severe obstructive sleep apnea positional component\par AHI 33\par Follow-up post treatment with overnight oximetry \par  \par PFT 2/28/22\par Very mild OAD\par TLC 86 %  normal lung volumes\par ERV 25 % sec overweight\par  DLCO  88 %\par HGB 11.9\par \par Chest x-ray PA lateral February 18, 2022\par Normal cardiac size\par Left midlung zone intrapulmonary lymph node versus end on vascular\par Not exclude tiny faint subcentimeter density right lower lobe\par No parenchymal infiltrates\par No pneumothorax\par No pleural effusion\par Roxie mediastinum unremarkable\par Soft tissue bony structures unremarkable\par Comparison to January 29, 2021 chest x-ray no interval change\par Right lower lobe most consistent with a subcentimeter granuloma\par \par PFT 11/12/21\par Normal flow rates\par  lung volumes nl\par  DLCO 86 % pred WNL\par HGB 11.1\par \par PFT 8/20/21\par flow rates nl\par  mild OAD\par TLC 94\par RES IS INC SP CONDUCTANCE DEC\par DLCO 88 %\par interval improvement of flow rates\par \par PFT 5/12/21\par mILD oad\par lUNG vOLUMES NL\par DLCO 80 % wnl\par hgb 11.9\par \par PFT 2/22/21\par Normal  flow rates\par  minimal OAD\par Normal  lung volumes borderline airtrapping\par  Normal DLCO  84 %  pred\par  HGB 11.7\par \par Chest x-ray PA lateral January 29, 2021\par Normal cardiac size\par Clear lungs\par No parenchymal infiltrates pleural effusions with dominant pulmonary nodules left well-circumscribed intrapulmonary lymph node\par Tiny nodularity noted right lower lung zone most consistent with granuloma dating back  2 years\par \par PFT Nov 23 2020\par  Mild OAD\par normal lung volumes\par Normal DLCO\par  HGB 11.4 \par Interval improvement at Flow Rates \par \par NIOX 69 PPD October 22, 2020\par PFT October 22, 2020\par Mild reduction flow rates with a mild obstructive ventilatory impairment\par No significant response to bronchodilator at the FEV1 measuring 7%\par Borderline response at the small airways of 15%\par Lung volumes demonstrate normal total lung capacity\par Air trapping with RV/TLC ratio 38% predicted and a decreased ERV likely secondary to patient increased abdominal girth\par Diffusion normal 100% predicted.\par Hemoglobin 11.4\par Data compared to August 11, 2020 does demonstrate some mild reduction at the flow rates\par \par DEXA 9/17/20\par normal study\par \par 8/11/2020\par NIOX 50\par PFT\par  spirometry normal\par 30% response to bronchodilator at the FEV1\par Normal lung volumes\par Diffusion normal 92% predicted.\par Positive bronchodilator response\par \par Chest x-ray PA lateral March 9, 2020\par Normal cardiac size\par Clear lung fields without parenchymal infiltrates pleural effusions dominant pulmonary nodules\par Tissue bony structures normal\par Roxie mediastinum normal\par Impression clear lungs\par \par  CPAP DATA data    3/14/22\par usage 93 %\par Hours > 6\par Auto CPAp 6 - 18 cm h20\par AHI 1.9\par \par PFIZER completed vaccine\par \par Echocardiogram August 14, 2021\par Mild concentric LVH\par Impaired LV relaxation with normal filling pressure\par Myxomatous mitral valve\par Mild to moderate mitral regurgitation\par Mild tricuspid regurgitation\par Normal global LV systolic function\par Stress test treadmill August 14, 2021\par No reported ischemic changes\par No arrhythmia noted\par \par Flu vaccination intramuscular October 10, 2022\par

## 2022-10-31 ENCOUNTER — APPOINTMENT (OUTPATIENT)
Dept: ORTHOPEDIC SURGERY | Facility: CLINIC | Age: 57
End: 2022-10-31

## 2022-10-31 VITALS
SYSTOLIC BLOOD PRESSURE: 124 MMHG | DIASTOLIC BLOOD PRESSURE: 79 MMHG | TEMPERATURE: 97.4 F | BODY MASS INDEX: 35.4 KG/M2 | HEIGHT: 69 IN | WEIGHT: 239 LBS | OXYGEN SATURATION: 98 % | HEART RATE: 82 BPM

## 2022-10-31 PROCEDURE — 99214 OFFICE O/P EST MOD 30 MIN: CPT

## 2022-10-31 PROCEDURE — 73502 X-RAY EXAM HIP UNI 2-3 VIEWS: CPT

## 2022-10-31 NOTE — ASSESSMENT
[FreeTextEntry1] : I had a lengthy discussion with the patient in regards to treatment plan and diagnosis. There are no red flag findings on imaging nor are there any red flag findings on clinical exam.  Therefore we will proceed with a course of conservative treatment.  This would include physical therapy/home exercise program, Tylenol, NSAIDs as medically indicated.  The patient will follow up with me 4 weeks.  I encouraged the patient to follow-up sooner if there are any new or worsening symptoms.\par \par I still think she may be a candidate for the Mainstay device once her hip pain resolves/improves.

## 2022-10-31 NOTE — PHYSICAL EXAM
[de-identified] : Lumbar Physical Exam\par \par Gait - Normal\par \par Station - Normal\par \par Sagittal balance - Normal\par \par Compensatory mechanism? - None\par \par Heel walk - Normal\par \par Toe walk - Normal\par \par Reflexes\par Patellar - normal\par Gastroc - normal\par Clonus - No\par \par Hip Exam -pain with range of motion of the right hip\par \par Straight leg raise - none\par \par Pulses - 2+ dp/pt\par \par Range of motion - normal\par \par Sensation \par Sensation intact to light touch in L1, L2, L3, L4, L5 and S1 dermatomes bilaterally\par \par Motor\par 	IP	Quad	HS	TA	Gastroc	EHL\par Right	5/5	5/5	5/5	5/5	5/5	5/5\par Left	5/5	5/5	5/5	5/5	5/5	5/5 [de-identified] : Lumbar MRI reviewed\par No areas of critical central stenosis\par No areas of critical foraminal stenosis\par 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

## 2022-10-31 NOTE — HISTORY OF PRESENT ILLNESS
[de-identified] : Today the patient states that she is still dealing with some fairly significant back pain.  She also describes significant right groin pain.  She has been diligently participating in physical therapy.  She states that at one of her physical therapy visit she did have a stretching exercise of her right hip which should exacerbate her pain.  She denies any bowel bladder issues.  She denies any saddle anesthesia.\par \par 09/16/22\par This is a 57-year-old female here today for evaluation of her low back pain symptoms.  She has been dealing with the symptoms for 2+ years.  She has tried extensive range of conservative management.  This has included physical therapy, analgesics but unfortunately her symptoms have persisted.  She denies any radicular pain.  She denies any saddle anesthesia or bowel bladder issues.  This pain does interfere with her quality life and her ability to perform her activities of daily living.

## 2022-11-01 ENCOUNTER — RX RENEWAL (OUTPATIENT)
Age: 57
End: 2022-11-01

## 2022-11-08 ENCOUNTER — APPOINTMENT (OUTPATIENT)
Dept: GASTROENTEROLOGY | Facility: CLINIC | Age: 57
End: 2022-11-08

## 2022-11-08 VITALS
TEMPERATURE: 97.3 F | RESPIRATION RATE: 12 BRPM | BODY MASS INDEX: 34.96 KG/M2 | SYSTOLIC BLOOD PRESSURE: 126 MMHG | OXYGEN SATURATION: 98 % | WEIGHT: 236 LBS | DIASTOLIC BLOOD PRESSURE: 78 MMHG | HEIGHT: 69 IN | HEART RATE: 88 BPM

## 2022-11-08 PROCEDURE — 99214 OFFICE O/P EST MOD 30 MIN: CPT

## 2022-11-08 NOTE — REASON FOR VISIT
[Follow-up] : a follow-up of an existing diagnosis [FreeTextEntry1] : resolved n/v; occasional anal irritation

## 2022-11-08 NOTE — ASSESSMENT
[FreeTextEntry1] : 57-year-old female that is post normal colonoscopy with Jovan Birch in 2020 with occasional bloating.  Celiac panel and H. pylori normal.  She did not go for abdominal ultrasound yet.  There is no weight loss anorexia.  She is exercising regularly and had seen an orthopedic surgeon for some low back pain.\par \par IMP:\par bloating, IBS vs gallstones\par \par PLAN:\par await sonogram\par if neg, will consider endoscopy\par RTO in 8 weeks

## 2022-11-08 NOTE — PHYSICAL EXAM
[Alert] : alert [Normal Voice/Communication] : normal voice/communication [Healthy Appearing] : healthy appearing [No Acute Distress] : no acute distress [Sclera] : the sclera and conjunctiva were normal [Hearing Threshold Finger Rub Not [place]] : hearing was normal [Normal Lips/Gums] : the lips and gums were normal [Oropharynx] : the oropharynx was normal [Normal Appearance] : the appearance of the neck was normal [No Neck Mass] : no neck mass was observed [No Respiratory Distress] : no respiratory distress [No Acc Muscle Use] : no accessory muscle use [Respiration, Rhythm And Depth] : normal respiratory rhythm and effort [Auscultation Breath Sounds / Voice Sounds] : lungs were clear to auscultation bilaterally [Heart Rate And Rhythm] : heart rate was normal and rhythm regular [Normal S1, S2] : normal S1 and S2 [Murmurs] : no murmurs [Bowel Sounds] : normal bowel sounds [Abdomen Tenderness] : non-tender [No Masses] : no abdominal mass palpated [Abdomen Soft] : soft [] : no hepatosplenomegaly [Oriented To Time, Place, And Person] : oriented to person, place, and time

## 2022-11-10 ENCOUNTER — APPOINTMENT (OUTPATIENT)
Dept: CARDIOLOGY | Facility: CLINIC | Age: 57
End: 2022-11-10

## 2022-11-10 VITALS
BODY MASS INDEX: 34.07 KG/M2 | OXYGEN SATURATION: 98 % | HEART RATE: 94 BPM | HEIGHT: 69 IN | WEIGHT: 230 LBS | DIASTOLIC BLOOD PRESSURE: 71 MMHG | SYSTOLIC BLOOD PRESSURE: 110 MMHG | TEMPERATURE: 98.6 F

## 2022-11-10 PROCEDURE — 99214 OFFICE O/P EST MOD 30 MIN: CPT

## 2022-11-10 NOTE — HISTORY OF PRESENT ILLNESS
[FreeTextEntry1] : This is a 57 year old female with a PMH of HTN, Asthma, Anemia, LEONARD, and Chronic Back pain who presents today for routine follow-up\par pt continues to have chronic back pain. pt start PT 2x weekly. \par pt reports feeling well Denies any CP or SOB

## 2022-11-14 ENCOUNTER — NON-APPOINTMENT (OUTPATIENT)
Age: 57
End: 2022-11-14

## 2022-11-14 ENCOUNTER — APPOINTMENT (OUTPATIENT)
Dept: ORTHOPEDIC SURGERY | Facility: CLINIC | Age: 57
End: 2022-11-14

## 2022-11-14 VITALS
HEART RATE: 85 BPM | OXYGEN SATURATION: 98 % | DIASTOLIC BLOOD PRESSURE: 77 MMHG | SYSTOLIC BLOOD PRESSURE: 111 MMHG | BODY MASS INDEX: 34.07 KG/M2 | WEIGHT: 230 LBS | HEIGHT: 69 IN

## 2022-11-14 DIAGNOSIS — R20.0 ANESTHESIA OF SKIN: ICD-10-CM

## 2022-11-14 DIAGNOSIS — M18.11 UNILATERAL PRIMARY OSTEOARTHRITIS OF FIRST CARPOMETACARPAL JOINT, RIGHT HAND: ICD-10-CM

## 2022-11-14 DIAGNOSIS — R20.2 ANESTHESIA OF SKIN: ICD-10-CM

## 2022-11-14 DIAGNOSIS — M18.12 UNILATERAL PRIMARY OSTEOARTHRITIS OF FIRST CARPOMETACARPAL JOINT, LEFT HAND: ICD-10-CM

## 2022-11-14 PROCEDURE — 99214 OFFICE O/P EST MOD 30 MIN: CPT

## 2022-11-14 PROCEDURE — 73110 X-RAY EXAM OF WRIST: CPT | Mod: 50

## 2022-11-15 ENCOUNTER — RX RENEWAL (OUTPATIENT)
Age: 57
End: 2022-11-15

## 2022-11-15 ENCOUNTER — APPOINTMENT (OUTPATIENT)
Dept: ULTRASOUND IMAGING | Facility: CLINIC | Age: 57
End: 2022-11-15

## 2022-11-15 ENCOUNTER — TRANSCRIPTION ENCOUNTER (OUTPATIENT)
Age: 57
End: 2022-11-15

## 2022-11-15 ENCOUNTER — OUTPATIENT (OUTPATIENT)
Dept: OUTPATIENT SERVICES | Facility: HOSPITAL | Age: 57
LOS: 1 days | End: 2022-11-15
Payer: COMMERCIAL

## 2022-11-15 DIAGNOSIS — R14.0 ABDOMINAL DISTENSION (GASEOUS): ICD-10-CM

## 2022-11-15 DIAGNOSIS — Z00.8 ENCOUNTER FOR OTHER GENERAL EXAMINATION: ICD-10-CM

## 2022-11-15 PROCEDURE — 76700 US EXAM ABDOM COMPLETE: CPT | Mod: 26

## 2022-11-15 PROCEDURE — 76700 US EXAM ABDOM COMPLETE: CPT

## 2022-11-16 ENCOUNTER — TRANSCRIPTION ENCOUNTER (OUTPATIENT)
Age: 57
End: 2022-11-16

## 2022-11-21 ENCOUNTER — APPOINTMENT (OUTPATIENT)
Dept: PULMONOLOGY | Facility: CLINIC | Age: 57
End: 2022-11-21

## 2022-11-21 VITALS — OXYGEN SATURATION: 99 % | HEART RATE: 70 BPM | DIASTOLIC BLOOD PRESSURE: 74 MMHG | SYSTOLIC BLOOD PRESSURE: 109 MMHG

## 2022-11-21 PROCEDURE — 94010 BREATHING CAPACITY TEST: CPT

## 2022-11-21 PROCEDURE — 99214 OFFICE O/P EST MOD 30 MIN: CPT | Mod: 25

## 2022-11-21 PROCEDURE — ZZZZZ: CPT

## 2022-11-21 PROCEDURE — 94729 DIFFUSING CAPACITY: CPT

## 2022-11-21 PROCEDURE — 94727 GAS DIL/WSHOT DETER LNG VOL: CPT

## 2022-11-21 NOTE — DISCUSSION/SUMMARY
[FreeTextEntry1] : Improvement Pulmonary Physiology\par LEONARD with significant improvement of CPAP usage- addressed risks /benefits- discussed  benefits re  new RESMED\par Mild asthma need daily compliance with use Symbicort and rinse\par sinus disease\par Off Spiriva\par    Hypertension better controlled\par Proteinuria with history mild renal insufficiency management PMD\par Heme  noted\par continue singulair 10 mg QD\par Symbicort  and prn Proair as noted\par LEONARD- AUTO CPAP  6 - 18 cm H20 RESMED\par  Proair before swimming 2 puffs\par Notify of any wheezing.  Notify if rescue inhaler is needed greater than 2-3 times in any week.  Avoid known triggers.\par Anemia w/u and noted stool negative heme renal\par ADDED  spiriva 2.5 mcg 2 puffs QD- HOLD\par \par Recommendation patient to consider bariatric surgery- did  not proceed\par Do believe her weight contributes to her sensation of shortness of breath\par Referral Cyrus Valiente MD- on hold\par \par f/u mammogram per PMD management\par  Colonoscopy- completed with Dr Birch\par NOTED GI Dr Garza EGD\par f/u RENAl for proteinuria- Med f/u\par cardiology ECHO  4/14/21 noted\par \par Patient compliant with CPAP therapy.  Continue present settings.  Patient with demonstrated clinical benefit with daytime and nocturnal symptomatology improvement.\par \par DEXA  Sept 2022\par Note has PCP for  overall  management and  will  defer other  medical  issues  to  her  care\par CPAP management visit\par  \par

## 2022-11-21 NOTE — PROCEDURE
[FreeTextEntry1] : PFT 11/2/122\par Flow rates nl\par  Mild OAD\par Lung volumes nl\par  DLCO 93 %\par HGB 10.8\par \par Millen 10/10/22\par flow rates nl  range\par  stable\par \par DEXA 10/10/22\par  Left femoral neck nl\par Total Left hip nl\par AP L1- L4 nl\par  normal study f/u 2 - 5 years\par \par CPAP data through October 9, 2022\par 87%\par AutoSet CPAP 6 to 15 cm H2O\par Greater than 7\par AHI 3.8\par \par Millen 8/25/22\par flow rates nl\par stable FEV1\par \par CPAP data compliance 8/24/22\par 97 %\par hours >7\par  AUTO CPAP 6-18 cm H20\par AHI 2.5\par pod occ airleak\par \par CPAP_ RESMED 6/23/22\par pos  airleak\par \par PFT 5/13/22\par Flow rates nl\par Lung Volumes nl\par DLCO 109 %\par HGB 11.2\par \par Sleep study February 25 February 27, 2022\par Severe obstructive sleep apnea positional component\par AHI 33\par Follow-up post treatment with overnight oximetry \par  \par PFT 2/28/22\par Very mild OAD\par TLC 86 %  normal lung volumes\par ERV 25 % sec overweight\par  DLCO  88 %\par HGB 11.9\par \par Chest x-ray PA lateral February 18, 2022\par Normal cardiac size\par Left midlung zone intrapulmonary lymph node versus end on vascular\par Not exclude tiny faint subcentimeter density right lower lobe\par No parenchymal infiltrates\par No pneumothorax\par No pleural effusion\par Roxie mediastinum unremarkable\par Soft tissue bony structures unremarkable\par Comparison to January 29, 2021 chest x-ray no interval change\par Right lower lobe most consistent with a subcentimeter granuloma\par \par PFT 11/12/21\par Normal flow rates\par  lung volumes nl\par  DLCO 86 % pred WNL\par HGB 11.1\par \par PFT 8/20/21\par flow rates nl\par  mild OAD\par TLC 94\par RES IS INC SP CONDUCTANCE DEC\par DLCO 88 %\par interval improvement of flow rates\par \par PFT 5/12/21\par mILD oad\par lUNG vOLUMES NL\par DLCO 80 % wnl\par hgb 11.9\par \par PFT 2/22/21\par Normal  flow rates\par  minimal OAD\par Normal  lung volumes borderline airtrapping\par  Normal DLCO  84 %  pred\par  HGB 11.7\par \par Chest x-ray PA lateral January 29, 2021\par Normal cardiac size\par Clear lungs\par No parenchymal infiltrates pleural effusions with dominant pulmonary nodules left well-circumscribed intrapulmonary lymph node\par Tiny nodularity noted right lower lung zone most consistent with granuloma dating back  2 years\par \par PFT Nov 23 2020\par  Mild OAD\par normal lung volumes\par Normal DLCO\par  HGB 11.4 \par Interval improvement at Flow Rates \par \par NIOX 69 PPD October 22, 2020\par PFT October 22, 2020\par Mild reduction flow rates with a mild obstructive ventilatory impairment\par No significant response to bronchodilator at the FEV1 measuring 7%\par Borderline response at the small airways of 15%\par Lung volumes demonstrate normal total lung capacity\par Air trapping with RV/TLC ratio 38% predicted and a decreased ERV likely secondary to patient increased abdominal girth\par Diffusion normal 100% predicted.\par Hemoglobin 11.4\par Data compared to August 11, 2020 does demonstrate some mild reduction at the flow rates\par \par DEXA 9/17/20\par normal study\par \par 8/11/2020\par NIOX 50\par PFT\par  spirometry normal\par 30% response to bronchodilator at the FEV1\par Normal lung volumes\par Diffusion normal 92% predicted.\par Positive bronchodilator response\par \par Chest x-ray PA lateral March 9, 2020\par Normal cardiac size\par Clear lung fields without parenchymal infiltrates pleural effusions dominant pulmonary nodules\par Tissue bony structures normal\par Roxie mediastinum normal\par Impression clear lungs\par \par  CPAP DATA data    3/14/22\par usage 93 %\par Hours > 6\par Auto CPAp 6 - 18 cm h20\par AHI 1.9\par \par PFIZER completed vaccine\par \par Echocardiogram August 14, 2021\par Mild concentric LVH\par Impaired LV relaxation with normal filling pressure\par Myxomatous mitral valve\par Mild to moderate mitral regurgitation\par Mild tricuspid regurgitation\par Normal global LV systolic function\par Stress test treadmill August 14, 2021\par No reported ischemic changes\par No arrhythmia noted\par \par Flu vaccination intramuscular October 10, 2022\par

## 2022-11-23 NOTE — ADDENDUM
Pt calling needing refill today.  Does not have enough to get through the holiday without it.     RX approved per refill guidelines.    [FreeTextEntry1] : 6/15/22\par Called patient to review lab results. B12 396. Advised B12 1000 mcg IM injections weekly x 6 doses. \par Patient verbalized understanding and agreed. Will have schedulers call patient.\par Patient reports receiving Pfizer booster in March 2022.

## 2022-11-27 ENCOUNTER — RX RENEWAL (OUTPATIENT)
Age: 57
End: 2022-11-27

## 2022-12-05 ENCOUNTER — APPOINTMENT (OUTPATIENT)
Dept: ORTHOPEDIC SURGERY | Facility: CLINIC | Age: 57
End: 2022-12-05

## 2022-12-05 VITALS
HEIGHT: 69 IN | WEIGHT: 230 LBS | HEART RATE: 85 BPM | DIASTOLIC BLOOD PRESSURE: 80 MMHG | TEMPERATURE: 97.4 F | SYSTOLIC BLOOD PRESSURE: 129 MMHG | OXYGEN SATURATION: 98 % | BODY MASS INDEX: 34.07 KG/M2

## 2022-12-05 PROCEDURE — 99214 OFFICE O/P EST MOD 30 MIN: CPT

## 2022-12-05 NOTE — HISTORY OF PRESENT ILLNESS
[de-identified] : Today the patient states that she is still dealing with some significant back and right groin pain.  She does feel like physical therapy helps her but she has some fairly constant low back pain.  She does have right hip pain with range of motion at times.  This right hip pain/groin pain is not constant.  She does feel like swimming has helped her to some degree as well.\par \par 10/31/22\par Today the patient states that she is still dealing with some fairly significant back pain.  She also describes significant right groin pain.  She has been diligently participating in physical therapy.  She states that at one of her physical therapy visit she did have a stretching exercise of her right hip which should exacerbate her pain.  She denies any bowel bladder issues.  She denies any saddle anesthesia.\par \par 09/16/22\par This is a 57-year-old female here today for evaluation of her low back pain symptoms.  She has been dealing with the symptoms for 2+ years.  She has tried extensive range of conservative management.  This has included physical therapy, analgesics but unfortunately her symptoms have persisted.  She denies any radicular pain.  She denies any saddle anesthesia or bowel bladder issues.  This pain does interfere with her quality life and her ability to perform her activities of daily living.

## 2022-12-05 NOTE — PHYSICAL EXAM
[de-identified] : Lumbar Physical Exam\par \par Gait - Normal\par \par Station - Normal\par \par Sagittal balance - Normal\par \par Compensatory mechanism? - None\par \par Heel walk - Normal\par \par Toe walk - Normal\par \par Reflexes\par Patellar - normal\par Gastroc - normal\par Clonus - No\par \par Hip Exam -pain with range of motion of the right hip\par \par Straight leg raise - none\par \par Pulses - 2+ dp/pt\par \par Range of motion - normal\par \par Sensation \par Sensation intact to light touch in L1, L2, L3, L4, L5 and S1 dermatomes bilaterally\par \par Motor\par 	IP	Quad	HS	TA	Gastroc	EHL\par Right	5/5	5/5	5/5	5/5	5/5	5/5\par Left	5/5	5/5	5/5	5/5	5/5	5/5 [de-identified] : Lumbar MRI reviewed\par No areas of critical central stenosis\par No areas of critical foraminal stenosis\par 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

## 2022-12-05 NOTE — ASSESSMENT
[FreeTextEntry1] : I had a long discussion with the patient in regards to her treatment plan and diagnosis.  She has done 6+ weeks of physical therapy but unfortunately she is still having significant symptoms.  She has tried extensive conservative management.  All of these measures have been ongoing for the past 2+ months.  Given her persistent right groin pain I want her ensure she has no hip pathology.  We will proceed with a right hip MRI.  She will continue with tizanidine usage.  She cannot take NSAIDs due to her kidney issues.  She should continue to take Tylenol.  We will have her follow-up in 3 to 4 weeks.\par \par Please note I did discuss with the patient the role of multifidus atrophy.  We both agreed that we would not proceed with any sort of implantation of a mainstay device just yet.  We will keep this as a possible treatment option.  She may be a candidate for a right hip injection if this right hip MRI does come back with any significant results.

## 2022-12-09 ENCOUNTER — OUTPATIENT (OUTPATIENT)
Dept: OUTPATIENT SERVICES | Facility: HOSPITAL | Age: 57
LOS: 1 days | Discharge: HOME | End: 2022-12-09

## 2022-12-09 DIAGNOSIS — E53.8 DEFICIENCY OF OTHER SPECIFIED B GROUP VITAMINS: ICD-10-CM

## 2022-12-13 ENCOUNTER — APPOINTMENT (OUTPATIENT)
Dept: HEMATOLOGY ONCOLOGY | Facility: CLINIC | Age: 57
End: 2022-12-13

## 2022-12-13 ENCOUNTER — RESULT REVIEW (OUTPATIENT)
Age: 57
End: 2022-12-13

## 2022-12-13 VITALS
HEART RATE: 67 BPM | WEIGHT: 238.1 LBS | TEMPERATURE: 97.4 F | DIASTOLIC BLOOD PRESSURE: 83 MMHG | RESPIRATION RATE: 16 BRPM | SYSTOLIC BLOOD PRESSURE: 124 MMHG | OXYGEN SATURATION: 100 %

## 2022-12-13 LAB
BASOPHILS # BLD AUTO: 0.03 K/UL — SIGNIFICANT CHANGE UP (ref 0–0.2)
BASOPHILS NFR BLD AUTO: 0.6 % — SIGNIFICANT CHANGE UP (ref 0–2)
EOSINOPHIL # BLD AUTO: 0.09 K/UL — SIGNIFICANT CHANGE UP (ref 0–0.5)
EOSINOPHIL NFR BLD AUTO: 1.7 % — SIGNIFICANT CHANGE UP (ref 0–6)
HCT VFR BLD CALC: 36.5 % — SIGNIFICANT CHANGE UP (ref 34.5–45)
HGB BLD-MCNC: 11.4 G/DL — LOW (ref 11.5–15.5)
IMM GRANULOCYTES NFR BLD AUTO: 0.2 % — SIGNIFICANT CHANGE UP (ref 0–0.9)
LYMPHOCYTES # BLD AUTO: 1.93 K/UL — SIGNIFICANT CHANGE UP (ref 1–3.3)
LYMPHOCYTES # BLD AUTO: 36.4 % — SIGNIFICANT CHANGE UP (ref 13–44)
MCHC RBC-ENTMCNC: 29.2 PG — SIGNIFICANT CHANGE UP (ref 27–34)
MCHC RBC-ENTMCNC: 31.2 G/DL — LOW (ref 32–36)
MCV RBC AUTO: 93.6 FL — SIGNIFICANT CHANGE UP (ref 80–100)
MONOCYTES # BLD AUTO: 0.47 K/UL — SIGNIFICANT CHANGE UP (ref 0–0.9)
MONOCYTES NFR BLD AUTO: 8.9 % — SIGNIFICANT CHANGE UP (ref 2–14)
NEUTROPHILS # BLD AUTO: 2.77 K/UL — SIGNIFICANT CHANGE UP (ref 1.8–7.4)
NEUTROPHILS NFR BLD AUTO: 52.2 % — SIGNIFICANT CHANGE UP (ref 43–77)
NRBC # BLD: 0 /100 WBCS — SIGNIFICANT CHANGE UP (ref 0–0)
PLATELET # BLD AUTO: 260 K/UL — SIGNIFICANT CHANGE UP (ref 150–400)
RBC # BLD: 3.9 M/UL — SIGNIFICANT CHANGE UP (ref 3.8–5.2)
RBC # FLD: 12.1 % — SIGNIFICANT CHANGE UP (ref 10.3–14.5)
WBC # BLD: 5.3 K/UL — SIGNIFICANT CHANGE UP (ref 3.8–10.5)
WBC # FLD AUTO: 5.3 K/UL — SIGNIFICANT CHANGE UP (ref 3.8–10.5)

## 2022-12-13 PROCEDURE — 99215 OFFICE O/P EST HI 40 MIN: CPT

## 2022-12-13 NOTE — ASSESSMENT
[FreeTextEntry1] : 56yo F with PMH of asthma, morbid obesity,  sleep apnea on CPAP, HTN, proteinuria. Patient was referred for evaluation and management of anemia. \par \par Laboratory studies CBC 6/24/19 : WBC 4.4, Hb 11.7, RBC 3.87, MCV 92.5, Hct 35.9%, RDW 11.9%, PLTs 257.  \par \par 6/24/19 iron studies were normal\par Ferritin 230\par Vitamin B 12 429\par Folate 16\par Light chain kappa 2.89 mildly elevated but kappa/ lambda ratio were normal. \par Immunofixation was negative for monoclonal band. \par \par 6/7/22\par CBC: WBC 4.70 K/uL,  HGB 10.5 g/dL,  HCT 33.1 %,  PLTS 231 K/uL. Results reviewed with patient\par CMP, Ferritin, B12/Folate, Iron studies pending\par \par 12/13/22- WBc 5.3, Hb 11.4g/dl, hct 36.5%, MCV 93.6, RDW 12.2, . \par \par -Back pain- following with chiropractor , hydrotherapy. \par \par - Sleep apnea: following with pulmonary. \par \par - Obesity : trying to loss weight with weight training, yoga and diet modifications. \par \par -HTN: on Olmesartan. \par \par RTC  prn.\par

## 2022-12-13 NOTE — REVIEW OF SYSTEMS
[Joint Pain] : joint pain [Negative] : Allergic/Immunologic [Fatigue] : no fatigue [SOB on Exertion] : no shortness of breath during exertion [Confused] : no confusion [Dizziness] : no dizziness [Fainting] : no fainting [Difficulty Walking] : no difficulty walking [FreeTextEntry9] : chronic low back pain pain radiates to the right thigh  [de-identified] : intermittent numbness and tingling to fingertips for over 6 months.

## 2022-12-13 NOTE — HISTORY OF PRESENT ILLNESS
[0 - No Distress] : Distress Level: 0 [90: Able to carry normal activity; minor signs or symptoms of disease.] : 90: Able to carry normal activity; minor signs or symptoms of disease.  [de-identified] : 58 yo F with PMH of asthma, morbid obesity,  sleep apnea on CPAP, HTN, proteinuria. Patient was referred for evaluation and management of anemia. \par \par Laboratory studies from November, 2018 c/w folate 13, B 12 355.\par Iron studies ferritin 225, iron 127, TIBC 294, % saturation iron 43%.\par Creatine 0.78. \par \par Patient has history of iron deficiency anemia secondary to menorrhagia; s/p hysterectomy due to uterine fibroids.\par \par Patient has been followed by Dr Bruno Breen for history of renal insufficiency in 2005,  and proteinuria which has been stable. Patient developed BIJAN and proteinuria  in the setting of NSAID use and streptococcal infection. Pt. with acute post streptococcal GN in 2005.\par \par Patient denies feeling dizzy, lightheadedness, palpitations, recently diagnosed with asthma within the last year complaints of sob with exertion. 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 \par \par  [de-identified] : Patient presents here today for a follow up. S/P B 12 1000 mcg IM x 6 doses. \par Denies any fever/chills, night sweats, fatigue, CP, palpitations, bleeding, melena, hematochezia. \par \par No other changes in medical, surgical or social history since 6/7/2022. \par \par \par \par \par

## 2022-12-14 LAB
ALBUMIN SERPL ELPH-MCNC: 4.5 G/DL
ALP BLD-CCNC: 76 U/L
ALT SERPL-CCNC: 12 U/L
ANION GAP SERPL CALC-SCNC: 11 MMOL/L
AST SERPL-CCNC: 17 U/L
BILIRUB SERPL-MCNC: 0.3 MG/DL
BUN SERPL-MCNC: 15 MG/DL
CALCIUM SERPL-MCNC: 10.3 MG/DL
CHLORIDE SERPL-SCNC: 106 MMOL/L
CO2 SERPL-SCNC: 28 MMOL/L
CREAT SERPL-MCNC: 0.97 MG/DL
EGFR: 68 ML/MIN/1.73M2
FERRITIN SERPL-MCNC: 247 NG/ML
FOLATE SERPL-MCNC: 12.4 NG/ML
GLUCOSE SERPL-MCNC: 98 MG/DL
IRON SATN MFR SERPL: 29 %
IRON SERPL-MCNC: 90 UG/DL
POTASSIUM SERPL-SCNC: 4.4 MMOL/L
PROT SERPL-MCNC: 7.4 G/DL
SODIUM SERPL-SCNC: 145 MMOL/L
TIBC SERPL-MCNC: 313 UG/DL
UIBC SERPL-MCNC: 223 UG/DL
VIT B12 SERPL-MCNC: 470 PG/ML

## 2022-12-26 ENCOUNTER — APPOINTMENT (OUTPATIENT)
Dept: MRI IMAGING | Facility: CLINIC | Age: 57
End: 2022-12-26
Payer: COMMERCIAL

## 2022-12-26 ENCOUNTER — OUTPATIENT (OUTPATIENT)
Dept: OUTPATIENT SERVICES | Facility: HOSPITAL | Age: 57
LOS: 1 days | End: 2022-12-26
Payer: COMMERCIAL

## 2022-12-26 DIAGNOSIS — M54.9 DORSALGIA, UNSPECIFIED: ICD-10-CM

## 2022-12-26 DIAGNOSIS — Z00.8 ENCOUNTER FOR OTHER GENERAL EXAMINATION: ICD-10-CM

## 2022-12-26 PROCEDURE — 73721 MRI JNT OF LWR EXTRE W/O DYE: CPT | Mod: 26,RT

## 2022-12-26 PROCEDURE — 73721 MRI JNT OF LWR EXTRE W/O DYE: CPT

## 2023-01-05 ENCOUNTER — APPOINTMENT (OUTPATIENT)
Dept: ORTHOPEDIC SURGERY | Facility: CLINIC | Age: 58
End: 2023-01-05
Payer: COMMERCIAL

## 2023-01-05 VITALS
DIASTOLIC BLOOD PRESSURE: 86 MMHG | WEIGHT: 238 LBS | SYSTOLIC BLOOD PRESSURE: 128 MMHG | BODY MASS INDEX: 35.25 KG/M2 | HEART RATE: 96 BPM | HEIGHT: 69 IN

## 2023-01-05 PROCEDURE — 99214 OFFICE O/P EST MOD 30 MIN: CPT

## 2023-01-05 NOTE — HISTORY OF PRESENT ILLNESS
[de-identified] : Today the patient states that she is still dealing with some fairly substantial low back and right buttock pain.  Denies any bowel bladder issues.  She denies any saddle anesthesia.  She denies any radicular type pain.  She is here today to go over her right hip MRI results.\par \par 12/5/22\par Today the patient states that she is still dealing with some significant back and right groin pain.  She does feel like physical therapy helps her but she has some fairly constant low back pain.  She does have right hip pain with range of motion at times.  This right hip pain/groin pain is not constant.  She does feel like swimming has helped her to some degree as well.\par \par 10/31/22\par Today the patient states that she is still dealing with some fairly significant back pain.  She also describes significant right groin pain.  She has been diligently participating in physical therapy.  She states that at one of her physical therapy visit she did have a stretching exercise of her right hip which should exacerbate her pain.  She denies any bowel bladder issues.  She denies any saddle anesthesia.\par \par 09/16/22\par This is a 57-year-old female here today for evaluation of her low back pain symptoms.  She has been dealing with the symptoms for 2+ years.  She has tried extensive range of conservative management.  This has included physical therapy, analgesics but unfortunately her symptoms have persisted.  She denies any radicular pain.  She denies any saddle anesthesia or bowel bladder issues.  This pain does interfere with her quality life and her ability to perform her activities of daily living.

## 2023-01-05 NOTE — PHYSICAL EXAM
[de-identified] : Lumbar Physical Exam\par \par Gait - Normal\par \par Station - Normal\par \par Sagittal balance - Normal\par \par Compensatory mechanism? - None\par \par Heel walk - Normal\par \par Toe walk - Normal\par \par Reflexes\par Patellar - normal\par Gastroc - normal\par Clonus - No\par \par Hip Exam -pain with range of motion of the right hip\par \par Straight leg raise - none\par \par Pulses - 2+ dp/pt\par \par Range of motion - normal\par \par Sensation \par Sensation intact to light touch in L1, L2, L3, L4, L5 and S1 dermatomes bilaterally\par \par Motor\par 	IP	Quad	HS	TA	Gastroc	EHL\par Right	5/5	5/5	5/5	5/5	5/5	5/5\par Left	5/5	5/5	5/5	5/5	5/5	5/5 [de-identified] : Lumbar MRI reviewed\par No areas of critical central stenosis\par No areas of critical foraminal stenosis\par 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\par \par Right hip MRI reviewed\par Mild to moderate osteoarthritis noted

## 2023-01-05 NOTE — ASSESSMENT
[FreeTextEntry1] : I had a long discussion with the patient in regards to her treatment plan and diagnosis.  She does have some fairly severe back pain as well as right buttock pain.  She does have moderate degenerative changes on the right MRI of the hip.  We will get her set up with a right hip steroid injection.  This is been ordered today.  I would also like her to continue with physical therapy and home exercise program.  I will have her follow-up in approximately 4 to 5 weeks for repeat clinical evaluation.  Encouraged to reach out to me at any point if her symptoms worsen or change in

## 2023-01-10 ENCOUNTER — RESULT REVIEW (OUTPATIENT)
Age: 58
End: 2023-01-10

## 2023-01-10 ENCOUNTER — APPOINTMENT (OUTPATIENT)
Dept: GASTROENTEROLOGY | Facility: AMBULATORY SURGERY CENTER | Age: 58
End: 2023-01-10
Payer: COMMERCIAL

## 2023-01-10 PROCEDURE — 43239 EGD BIOPSY SINGLE/MULTIPLE: CPT

## 2023-01-11 ENCOUNTER — TRANSCRIPTION ENCOUNTER (OUTPATIENT)
Age: 58
End: 2023-01-11

## 2023-01-12 ENCOUNTER — TRANSCRIPTION ENCOUNTER (OUTPATIENT)
Age: 58
End: 2023-01-12

## 2023-01-13 ENCOUNTER — RESULT REVIEW (OUTPATIENT)
Age: 58
End: 2023-01-13

## 2023-01-13 ENCOUNTER — APPOINTMENT (OUTPATIENT)
Dept: RADIOLOGY | Facility: CLINIC | Age: 58
End: 2023-01-13
Payer: COMMERCIAL

## 2023-01-13 ENCOUNTER — NON-APPOINTMENT (OUTPATIENT)
Age: 58
End: 2023-01-13

## 2023-01-13 ENCOUNTER — OUTPATIENT (OUTPATIENT)
Dept: OUTPATIENT SERVICES | Facility: HOSPITAL | Age: 58
LOS: 1 days | End: 2023-01-13
Payer: COMMERCIAL

## 2023-01-13 DIAGNOSIS — Z00.8 ENCOUNTER FOR OTHER GENERAL EXAMINATION: ICD-10-CM

## 2023-01-13 DIAGNOSIS — M25.551 PAIN IN RIGHT HIP: ICD-10-CM

## 2023-01-13 PROCEDURE — 73525 CONTRAST X-RAY OF HIP: CPT | Mod: 26,RT

## 2023-01-13 PROCEDURE — 27093 INJECTION FOR HIP X-RAY: CPT | Mod: RT

## 2023-01-13 PROCEDURE — 27093 INJECTION FOR HIP X-RAY: CPT

## 2023-01-13 PROCEDURE — 73525 CONTRAST X-RAY OF HIP: CPT

## 2023-01-19 ENCOUNTER — RX RENEWAL (OUTPATIENT)
Age: 58
End: 2023-01-19

## 2023-02-27 ENCOUNTER — APPOINTMENT (OUTPATIENT)
Dept: PULMONOLOGY | Facility: CLINIC | Age: 58
End: 2023-02-27
Payer: COMMERCIAL

## 2023-02-27 VITALS — DIASTOLIC BLOOD PRESSURE: 88 MMHG | SYSTOLIC BLOOD PRESSURE: 139 MMHG | OXYGEN SATURATION: 100 % | HEART RATE: 83 BPM

## 2023-02-27 VITALS — SYSTOLIC BLOOD PRESSURE: 126 MMHG | DIASTOLIC BLOOD PRESSURE: 82 MMHG

## 2023-02-27 PROCEDURE — 71046 X-RAY EXAM CHEST 2 VIEWS: CPT

## 2023-02-27 PROCEDURE — 94010 BREATHING CAPACITY TEST: CPT

## 2023-02-27 PROCEDURE — 99214 OFFICE O/P EST MOD 30 MIN: CPT | Mod: 25

## 2023-02-27 NOTE — DISCUSSION/SUMMARY
[FreeTextEntry1] : Improvement Pulmonary Physiology\par LEONARD with significant improvement of CPAP usage- addressed risks /benefits- discussed  benefits re  new RESMED\par Mild asthma need daily compliance with use Symbicort and rinse\par sinus disease\par Off Spiriva\par    Hypertension better controlled\par Proteinuria with history mild renal insufficiency management PMD\par Heme  noted\par continue singulair 10 mg QD\par Symbicort  and prn Proair as noted\par LEONARD- AUTO CPAP  6 - 18 cm H20 RESMED\par  Proair before swimming 2 puffs\par Notify of any wheezing.  Notify if rescue inhaler is needed greater than 2-3 times in any week.  Avoid known triggers.\par Anemia w/u and noted stool negative heme renal\par ADDED  spiriva 2.5 mcg 2 puffs QD- HOLD\par \par Recommendation patient to consider bariatric surgery- did  not proceed\par Do believe her weight contributes to her sensation of shortness of breath\par Referral Cyrus Valiente MD- on hold\par \par f/u mammogram per PMD management\par  Colonoscopy- completed with Dr Birch\par NOTED GI Dr Garza EGD\par f/u RENAl for proteinuria- Med f/u\par cardiology ECHO  4/14/21 noted\par \par Patient compliant with CPAP therapy.  Continue present settings.  Patient with demonstrated clinical benefit with daytime and nocturnal symptomatology improvement.\par \par DEXA  Oct 2024\par Note has PCP for  overall  management and  will  defer other  medical  issues  to  her  care\par CPAP management visit\par  COVID BiValent booster up to  date\par

## 2023-02-27 NOTE — HISTORY OF PRESENT ILLNESS
[Stable] : are stable [None] : ~He/She~ has no significant interval events [Difficulty Breathing During Exertion] : stable dyspnea on exertion [Feelings Of Weakness On Exertion] : stable exercise intolerance [Cough] : denies coughing [Wheezing] : denies wheezing [Regional Soft Tissue Swelling Both Lower Extremities] : denies lower extremity edema [Chest Pain Or Discomfort] : denies chest pain [Fever] : denies fever [Obstructive Sleep Apnea] : obstructive sleep apnea [Date: ___] : Date of most recent diagnostic polysomnogram: [unfilled] [AHI: ___ per hour] : Apnea-hypopnea index:  [unfilled] per hour [Wt Gain ___ Lbs] : no recent weight gain [Wt Loss ___ Lbs] : no recent weight loss [Oxygen] : the patient uses no supplemental oxygen [Nocturnal Oxygen] : The patient does not use nocturnal oxygen [FreeTextEntry1] : sinus sxs chronic\par COVID Rapid AG neg\par ? allergy component better controlled with Rx nasal + generic singulair\par HEME\par 56yo F with PMH of asthma, morbid obesity, sleep apnea on CPAP, HTN, proteinuria. Patient was referred for evaluation and management of anemia. \par Laboratory studies CBC 6/24/19 : WBC 4.4, Hb 11.7, RBC 3.87, MCV 92.5, Hct 35.9%, RDW 11.9%, PLTs 257. \par 6/24/19 iron studies were normal\par Ferritin 230\par Vitamin B 12 429\par Folate 16\par Light chain kappa 2.89 mildly elevated but kappa/ lambda ratio were normal. \par Immunofixation was negative for monoclonal ban\par \par no active asthma sxs\par no inc use ERNESTO\par  issue with CPAP use \par does use So Clean but was not compliant - advised not to use\par no viral\par  no travel\par non sp  lumbar back pain\par Notes some component  left hip  pain with numbness  radiation left\par HEME Noted\par GI noted

## 2023-02-27 NOTE — PROCEDURE
[FreeTextEntry1] : Spirometry no bronchodilator February 27, 2023\par Flow rates are normal\par Ratio 76\par Interval improvement of flow rates compared to data of November 21, 2022\par \par Chest x-ray PA lateral\par Normal cardiac size\par Lung fields are clear\par Impression infiltrates pleural effusions or dominant pulmonary nodules\par There is a tiny right lower lobe sub-6 cm well-circumscribed nodule that has been stable dating back to at least March 2019 was consistent with a postinflammatory nodule/granuloma\par \par PFT 11/2/122\par Flow rates nl\par  Mild OAD\par Lung volumes nl\par  DLCO 93 %\par HGB 10.8\par \par Javy 10/10/22\par flow rates nl  range\par  stable\par \par DEXA 10/10/22\par  Left femoral neck nl\par Total Left hip nl\par AP L1- L4 nl\par  normal study f/u 2 - 5 years\par \par CPAP data through October 9, 2022\par 87%\par AutoSet CPAP 6 to 15 cm H2O\par Greater than 7\par AHI 3.8\par \par Leander 8/25/22\par flow rates nl\par stable FEV1\par \par CPAP data compliance 8/24/22\par 97 %\par hours >7\par  AUTO CPAP 6-18 cm H20\par AHI 2.5\par pod occ airleak\par \par CPAP_ RESMED 6/23/22\par pos  airleak\par \par PFT 5/13/22\par Flow rates nl\par Lung Volumes nl\par DLCO 109 %\par HGB 11.2\par \par Sleep study February 25 February 27, 2022\par Severe obstructive sleep apnea positional component\par AHI 33\par Follow-up post treatment with overnight oximetry \par  \par PFT 2/28/22\par Very mild OAD\par TLC 86 %  normal lung volumes\par ERV 25 % sec overweight\par  DLCO  88 %\par HGB 11.9\par \par Chest x-ray PA lateral February 18, 2022\par Normal cardiac size\par Left midlung zone intrapulmonary lymph node versus end on vascular\par Not exclude tiny faint subcentimeter density right lower lobe\par No parenchymal infiltrates\par No pneumothorax\par No pleural effusion\par Roxie mediastinum unremarkable\par Soft tissue bony structures unremarkable\par Comparison to January 29, 2021 chest x-ray no interval change\par Right lower lobe most consistent with a subcentimeter granuloma\par \par PFT 11/12/21\par Normal flow rates\par  lung volumes nl\par  DLCO 86 % pred WNL\par HGB 11.1\par \par PFT 8/20/21\par flow rates nl\par  mild OAD\par TLC 94\par RES IS INC SP CONDUCTANCE DEC\par DLCO 88 %\par interval improvement of flow rates\par \par PFT 5/12/21\par mILD oad\par lUNG vOLUMES NL\par DLCO 80 % wnl\par hgb 11.9\par \par PFT 2/22/21\par Normal  flow rates\par  minimal OAD\par Normal  lung volumes borderline airtrapping\par  Normal DLCO  84 %  pred\par  HGB 11.7\par \par Chest x-ray PA lateral January 29, 2021\par Normal cardiac size\par Clear lungs\par No parenchymal infiltrates pleural effusions with dominant pulmonary nodules left well-circumscribed intrapulmonary lymph node\par Tiny nodularity noted right lower lung zone most consistent with granuloma dating back  2 years\par \par PFT Nov 23 2020\par  Mild OAD\par normal lung volumes\par Normal DLCO\par  HGB 11.4 \par Interval improvement at Flow Rates \par \par NIOX 69 PPD October 22, 2020\par PFT October 22, 2020\par Mild reduction flow rates with a mild obstructive ventilatory impairment\par No significant response to bronchodilator at the FEV1 measuring 7%\par Borderline response at the small airways of 15%\par Lung volumes demonstrate normal total lung capacity\par Air trapping with RV/TLC ratio 38% predicted and a decreased ERV likely secondary to patient increased abdominal girth\par Diffusion normal 100% predicted.\par Hemoglobin 11.4\par Data compared to August 11, 2020 does demonstrate some mild reduction at the flow rates\par \par DEXA 9/17/20\par normal study\par \par 8/11/2020\par NIOX 50\par PFT\par  spirometry normal\par 30% response to bronchodilator at the FEV1\par Normal lung volumes\par Diffusion normal 92% predicted.\par Positive bronchodilator response\par \par Chest x-ray PA lateral March 9, 2020\par Normal cardiac size\par Clear lung fields without parenchymal infiltrates pleural effusions dominant pulmonary nodules\par Tissue bony structures normal\par Roxie mediastinum normal\par Impression clear lungs\par \par  CPAP DATA data    3/14/22\par usage 93 %\par Hours > 6\par Auto CPAp 6 - 18 cm h20\par AHI 1.9\par \par PFIZER completed vaccine\par \par Echocardiogram August 14, 2021\par Mild concentric LVH\par Impaired LV relaxation with normal filling pressure\par Myxomatous mitral valve\par Mild to moderate mitral regurgitation\par Mild tricuspid regurgitation\par Normal global LV systolic function\par Stress test treadmill August 14, 2021\par No reported ischemic changes\par No arrhythmia noted\par \par Flu vaccination intramuscular October 10, 2022\par

## 2023-03-01 ENCOUNTER — APPOINTMENT (OUTPATIENT)
Dept: NEUROLOGY | Facility: CLINIC | Age: 58
End: 2023-03-01
Payer: COMMERCIAL

## 2023-03-01 PROCEDURE — 95910 NRV CNDJ TEST 7-8 STUDIES: CPT

## 2023-03-01 PROCEDURE — 95885 MUSC TST DONE W/NERV TST LIM: CPT

## 2023-03-12 ENCOUNTER — RX RENEWAL (OUTPATIENT)
Age: 58
End: 2023-03-12

## 2023-03-20 ENCOUNTER — APPOINTMENT (OUTPATIENT)
Dept: ORTHOPEDIC SURGERY | Facility: CLINIC | Age: 58
End: 2023-03-20
Payer: COMMERCIAL

## 2023-03-20 VITALS
BODY MASS INDEX: 35.25 KG/M2 | DIASTOLIC BLOOD PRESSURE: 94 MMHG | WEIGHT: 238 LBS | SYSTOLIC BLOOD PRESSURE: 152 MMHG | HEIGHT: 69 IN

## 2023-03-20 PROCEDURE — 99213 OFFICE O/P EST LOW 20 MIN: CPT | Mod: 25

## 2023-03-21 ENCOUNTER — APPOINTMENT (OUTPATIENT)
Dept: ORTHOPEDIC SURGERY | Facility: CLINIC | Age: 58
End: 2023-03-21
Payer: COMMERCIAL

## 2023-03-21 ENCOUNTER — APPOINTMENT (OUTPATIENT)
Dept: ULTRASOUND IMAGING | Facility: IMAGING CENTER | Age: 58
End: 2023-03-21
Payer: COMMERCIAL

## 2023-03-21 ENCOUNTER — OUTPATIENT (OUTPATIENT)
Dept: OUTPATIENT SERVICES | Facility: HOSPITAL | Age: 58
LOS: 1 days | End: 2023-03-21
Payer: COMMERCIAL

## 2023-03-21 ENCOUNTER — NON-APPOINTMENT (OUTPATIENT)
Age: 58
End: 2023-03-21

## 2023-03-21 VITALS
DIASTOLIC BLOOD PRESSURE: 80 MMHG | SYSTOLIC BLOOD PRESSURE: 128 MMHG | WEIGHT: 239 LBS | OXYGEN SATURATION: 95 % | BODY MASS INDEX: 35.4 KG/M2 | HEART RATE: 73 BPM | HEIGHT: 69 IN

## 2023-03-21 DIAGNOSIS — M25.551 PAIN IN RIGHT HIP: ICD-10-CM

## 2023-03-21 DIAGNOSIS — Z00.8 ENCOUNTER FOR OTHER GENERAL EXAMINATION: ICD-10-CM

## 2023-03-21 DIAGNOSIS — M16.11 UNILATERAL PRIMARY OSTEOARTHRITIS, RIGHT HIP: ICD-10-CM

## 2023-03-21 PROCEDURE — 93970 EXTREMITY STUDY: CPT

## 2023-03-21 PROCEDURE — 93970 EXTREMITY STUDY: CPT | Mod: 26

## 2023-03-21 PROCEDURE — 99213 OFFICE O/P EST LOW 20 MIN: CPT

## 2023-03-21 PROCEDURE — 73502 X-RAY EXAM HIP UNI 2-3 VIEWS: CPT

## 2023-03-21 PROCEDURE — 72100 X-RAY EXAM L-S SPINE 2/3 VWS: CPT

## 2023-03-21 NOTE — PHYSICAL EXAM
[de-identified] : Lumbar Physical Exam\par \par Gait - Normal\par \par Station - Normal\par \par Sagittal balance - Normal\par \par Compensatory mechanism - None\par \par Heel walk - Normal\par \par Toe walk - Normal\par \par Reflexes\par Patellar - normal\par Gastroc - normal\par Clonus - No\par \par Hip Exam -pain with range of motion of the right hip\par \par Straight leg raise - none\par \par Pulses - 2+ dp/pt\par \par Range of motion - normal\par \par Sensation \par Sensation intact to light touch in L1, L2, L3, L4, L5 and S1 dermatomes bilaterally\par \par Motor\par 	IP	Quad	HS	TA	Gastroc	EHL\par Right	5/5	5/5	5/5	5/5	5/5	5/5\par Left	5/5	5/5	5/5	5/5	5/5	5/5\par \par Left lower extremity: No calf tenderness. Mild swelling to distal third of lower leg.  [de-identified] : Previous imaging:\par Lumbar MRI reviewed\par No areas of critical central stenosis\par No areas of critical foraminal stenosis\par 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\par \par Right hip MRI reviewed\par Mild to moderate osteoarthritis noted

## 2023-03-21 NOTE — ASSESSMENT
[FreeTextEntry1] : I had a lengthy discussion with the patient in regards to treatment plan and diagnosis. There are no red flag findings on imaging nor are there any red flag findings on clinical exam. She will start another round of physical therapy for both her back and right hip. Based on the right hip injection providing her significant short term relief the patient was referred to Dr. Dowd for further evaluation/treatment management of her hip pain.  The patient will follow up with me in approximately 6-8 weeks. I encouraged the patient to follow-up sooner if there are any new or worsening symptoms. She will also have an ultrasound of her lower extremities, rule out DVT based on complaint of significant swelling after flying. \par

## 2023-03-21 NOTE — HISTORY OF PRESENT ILLNESS
[de-identified] : 56 yo female following up for her low back and right hip pain. After last visit she had a right hip steroid injection and states that it had significantly decreased her pain for at least a month. She traveled to [place] after the injection and did a lot of walking. She states when she arrived on vacation her left lower extremity was very swollen. She feels it has decreased though still has some swelling to her calf and ankle. Denies injury or fall. Denies any bowel or bladder dysfunction or saddle anesthesia. \par \par 1/5/23\par Today the patient states that she is still dealing with some fairly substantial low back and right buttock pain.  Denies any bowel bladder issues.  She denies any saddle anesthesia.  She denies any radicular type pain.  She is here today to go over her right hip MRI results.\par \par 12/5/22\par Today the patient states that she is still dealing with some significant back and right groin pain.  She does feel like physical therapy helps her but she has some fairly constant low back pain.  She does have right hip pain with range of motion at times.  This right hip pain/groin pain is not constant.  She does feel like swimming has helped her to some degree as well.\par \par 10/31/22\par Today the patient states that she is still dealing with some fairly significant back pain.  She also describes significant right groin pain.  She has been diligently participating in physical therapy.  She states that at one of her physical therapy visit she did have a stretching exercise of her right hip which should exacerbate her pain.  She denies any bowel bladder issues.  She denies any saddle anesthesia.\par \par 09/16/22\par This is a 57-year-old female here today for evaluation of her low back pain symptoms.  She has been dealing with the symptoms for 2+ years.  She has tried extensive range of conservative management.  This has included physical therapy, analgesics but unfortunately her symptoms have persisted.  She denies any radicular pain.  She denies any saddle anesthesia or bowel bladder issues.  This pain does interfere with her quality life and her ability to perform her activities of daily living.

## 2023-03-23 ENCOUNTER — NON-APPOINTMENT (OUTPATIENT)
Age: 58
End: 2023-03-23

## 2023-03-23 ENCOUNTER — APPOINTMENT (OUTPATIENT)
Dept: INTERNAL MEDICINE | Facility: CLINIC | Age: 58
End: 2023-03-23
Payer: COMMERCIAL

## 2023-03-23 VITALS
DIASTOLIC BLOOD PRESSURE: 84 MMHG | HEIGHT: 69 IN | WEIGHT: 229 LBS | OXYGEN SATURATION: 98 % | HEART RATE: 78 BPM | SYSTOLIC BLOOD PRESSURE: 120 MMHG | BODY MASS INDEX: 33.92 KG/M2

## 2023-03-23 DIAGNOSIS — Z12.39 ENCOUNTER FOR OTHER SCREENING FOR MALIGNANT NEOPLASM OF BREAST: ICD-10-CM

## 2023-03-23 PROBLEM — M16.11 PRIMARY OSTEOARTHRITIS OF RIGHT HIP: Status: ACTIVE | Noted: 2023-03-23

## 2023-03-23 PROBLEM — M25.551 HIP PAIN, RIGHT: Status: ACTIVE | Noted: 2022-10-31

## 2023-03-23 LAB
ALBUMIN SERPL ELPH-MCNC: 4.7 G/DL
ALP BLD-CCNC: 77 U/L
ALT SERPL-CCNC: 16 U/L
ANION GAP SERPL CALC-SCNC: 14 MMOL/L
APPEARANCE: CLEAR
AST SERPL-CCNC: 19 U/L
BACTERIA: NEGATIVE
BASOPHILS # BLD AUTO: 0.03 K/UL
BASOPHILS NFR BLD AUTO: 0.6 %
BILIRUB SERPL-MCNC: 0.5 MG/DL
BILIRUBIN URINE: NEGATIVE
BLOOD URINE: NEGATIVE
BUN SERPL-MCNC: 13 MG/DL
CALCIUM SERPL-MCNC: 10.4 MG/DL
CHLORIDE SERPL-SCNC: 101 MMOL/L
CHOLEST SERPL-MCNC: 196 MG/DL
CK SERPL-CCNC: 202 U/L
CO2 SERPL-SCNC: 25 MMOL/L
COLOR: NORMAL
CREAT SERPL-MCNC: 0.93 MG/DL
EGFR: 72 ML/MIN/1.73M2
EOSINOPHIL # BLD AUTO: 0.09 K/UL
EOSINOPHIL NFR BLD AUTO: 1.7 %
ESTIMATED AVERAGE GLUCOSE: 103 MG/DL
FOLATE SERPL-MCNC: >20 NG/ML
GLUCOSE QUALITATIVE U: NEGATIVE
GLUCOSE SERPL-MCNC: 82 MG/DL
HBA1C MFR BLD HPLC: 5.2 %
HCT VFR BLD CALC: 40.9 %
HDLC SERPL-MCNC: 78 MG/DL
HGB BLD-MCNC: 12.5 G/DL
HYALINE CASTS: 5 /LPF
IMM GRANULOCYTES NFR BLD AUTO: 0.2 %
KETONES URINE: NEGATIVE
LDLC SERPL CALC-MCNC: 102 MG/DL
LEUKOCYTE ESTERASE URINE: ABNORMAL
LYMPHOCYTES # BLD AUTO: 2.16 K/UL
LYMPHOCYTES NFR BLD AUTO: 40.2 %
MAN DIFF?: NORMAL
MCHC RBC-ENTMCNC: 28.7 PG
MCHC RBC-ENTMCNC: 30.6 GM/DL
MCV RBC AUTO: 93.8 FL
MICROSCOPIC-UA: NORMAL
MONOCYTES # BLD AUTO: 0.43 K/UL
MONOCYTES NFR BLD AUTO: 8 %
NEUTROPHILS # BLD AUTO: 2.65 K/UL
NEUTROPHILS NFR BLD AUTO: 49.3 %
NITRITE URINE: NEGATIVE
NONHDLC SERPL-MCNC: 119 MG/DL
PH URINE: 6
PLATELET # BLD AUTO: 290 K/UL
POTASSIUM SERPL-SCNC: 3.9 MMOL/L
PROT SERPL-MCNC: 8.2 G/DL
PROTEIN URINE: NEGATIVE
RBC # BLD: 4.36 M/UL
RBC # FLD: 12.9 %
RED BLOOD CELLS URINE: 2 /HPF
SODIUM SERPL-SCNC: 140 MMOL/L
SPECIFIC GRAVITY URINE: 1.02
SQUAMOUS EPITHELIAL CELLS: 6 /HPF
T PALLIDUM AB SER QL IA: NEGATIVE
T4 FREE SERPL-MCNC: 1.1 NG/DL
TRIGL SERPL-MCNC: 82 MG/DL
TSH SERPL-ACNC: 0.75 UIU/ML
UROBILINOGEN URINE: NORMAL
VIT B12 SERPL-MCNC: 595 PG/ML
WBC # FLD AUTO: 5.37 K/UL
WHITE BLOOD CELLS URINE: 7 /HPF

## 2023-03-23 PROCEDURE — 93000 ELECTROCARDIOGRAM COMPLETE: CPT | Mod: 59

## 2023-03-23 PROCEDURE — 99214 OFFICE O/P EST MOD 30 MIN: CPT | Mod: 25

## 2023-03-23 RX ORDER — ASPIRIN ENTERIC COATED TABLETS 81 MG 81 MG/1
81 TABLET, DELAYED RELEASE ORAL
Qty: 1 | Refills: 0 | Status: DISCONTINUED | COMMUNITY
Start: 2022-03-17 | End: 2023-03-23

## 2023-03-23 RX ORDER — METHYLPREDNISOLONE 4 MG/1
4 TABLET ORAL
Qty: 1 | Refills: 0 | Status: DISCONTINUED | COMMUNITY
Start: 2022-08-25 | End: 2023-03-23

## 2023-03-23 RX ORDER — POTASSIUM CHLORIDE 1500 MG/1
20 TABLET, FILM COATED, EXTENDED RELEASE ORAL DAILY
Qty: 3 | Refills: 0 | Status: DISCONTINUED | COMMUNITY
Start: 2022-08-26 | End: 2023-03-23

## 2023-03-23 NOTE — HISTORY OF PRESENT ILLNESS
[FreeTextEntry8] : CC: left leg numbness/tingling\par \par JARETT INGRAM 57 year old female with a PMH of HTN, Asthma, Anemia, LEONARD, and Chronic Back pain who presents today for c/o L leg tingling, numbness and swelling for 2-3 months. Pt states the numbness and tingling is more prominent in the base of her foot.  Pt states the numbness and tingling is worse and more consistent on her L leg > R leg. Pt states she has been using compression stockings. She had negative duplex for DVT on 3/21 bilaterally. Say Dr Rivero for chronic back pain. Says she recently flew to [facility] and had leg swelling post flight that resolved, no redness, warmth, f.c\par \par Patient feels well. Denies chest pain, sob, alexander, dizziness, diaphoresis, palpitations, orthopnea, syncope, n/v, headache.\par Pt denies focal weakness, vision changes, numbness/tingling, dysphagia, dysarthria.\par \par

## 2023-03-23 NOTE — HISTORY OF PRESENT ILLNESS
[de-identified] : Ms. Pat is a 57-year-old female presents to the office for evaluation of her right hip and back pain.  Pain is located in the lower back and goes to the right posterior/proximal thigh and groin.  Groin pain can impact her walking and cause pain with walking.  She states that her groin pain has improved with physical therapy.  Pain can radiate to the knee.  Pain is worse with increased activities, standing, walking, or swimming.  Pain is improved with stretching and massage.  She reports some hand and left leg numbness/tingling as well as right posterior thigh numbness.  Patient has tried physical therapy and aqua therapy.  She continues to be in physical therapy.  She has tried muscle relaxants.  Patient underwent a right hip arthrogram/injection on 1/13/2023.  Injection gave relief for about 1 month, but patient has started to feel pain again.  Patient is typically active and swims, walks, and rides a bicycle.  She is unable to take NSAIDs due to history of kidney issues.  Patient has previously seen Dr. Rivero for her lower back pain.

## 2023-03-23 NOTE — PHYSICAL EXAM
[No Acute Distress] : no acute distress [Well Nourished] : well nourished [Well Developed] : well developed [Well-Appearing] : well-appearing [Normal Sclera/Conjunctiva] : normal sclera/conjunctiva [Normal Outer Ear/Nose] : the outer ears and nose were normal in appearance [Normal Oropharynx] : the oropharynx was normal [No JVD] : no jugular venous distention [No Lymphadenopathy] : no lymphadenopathy [Supple] : supple [No Respiratory Distress] : no respiratory distress  [No Accessory Muscle Use] : no accessory muscle use [Clear to Auscultation] : lungs were clear to auscultation bilaterally [Normal Rate] : normal rate  [Regular Rhythm] : with a regular rhythm [Normal S1, S2] : normal S1 and S2 [No Murmur] : no murmur heard [No Carotid Bruits] : no carotid bruits [No Varicosities] : no varicosities [Pedal Pulses Present] : the pedal pulses are present [No Edema] : there was no peripheral edema [No Extremity Clubbing/Cyanosis] : no extremity clubbing/cyanosis [Soft] : abdomen soft [Non Tender] : non-tender [Non-distended] : non-distended [Normal Bowel Sounds] : normal bowel sounds [Normal Anterior Cervical Nodes] : no anterior cervical lymphadenopathy [No CVA Tenderness] : no CVA  tenderness [No Spinal Tenderness] : no spinal tenderness [No Joint Swelling] : no joint swelling [Grossly Normal Strength/Tone] : grossly normal strength/tone [No Rash] : no rash [Coordination Grossly Intact] : coordination grossly intact [No Focal Deficits] : no focal deficits [Normal Gait] : normal gait [Normal Affect] : the affect was normal [Alert and Oriented x3] : oriented to person, place, and time [de-identified] : nontender calves, symmetric,

## 2023-03-23 NOTE — HEALTH RISK ASSESSMENT
[0] : 2) Feeling down, depressed, or hopeless: Not at all (0) [PHQ-2 Negative - No further assessment needed] : PHQ-2 Negative - No further assessment needed [[id number]] : 0

## 2023-03-23 NOTE — REVIEW OF SYSTEMS
[Joint Pain] : joint pain [Negative] : Endocrine [Joint Stiffness] : no joint stiffness [Joint Swelling] : no joint swelling [Fever] : no fever [Chills] : no chills [FreeTextEntry6] : Asthma, LEONARD [FreeTextEntry8] : Kidney failure in [id number], unknown cause, now improved

## 2023-03-23 NOTE — DISCUSSION/SUMMARY
[de-identified] : Ms. Pat is a 57-year-old female who presented to the office for evaluation of her right hip and back pain.  Patient has a history of right hip and back pain that can radiate down the leg.  She also reports history of groin pain that has improved with physical therapy.  Prior right hip injection improved pain for about 1 month.  X-rays showed moderate right hip osteoarthritis.  MRI showed mild right hip osteoarthritis.  Examination showed hip pain with SEAMUS/FADIR testing and with hip range of motion. Discussed with the patient the examination and imaging findings.  Discussed with the patient the operative and nonoperative management of hip osteoarthritis, including total hip arthroplasty.  Discussed that hip injection did not significantly improve pain for about 1 month.  Therefore, hip is likely a significant pain generator.  Patient would like to continue nonoperative management at this time.  Discussed the nonoperative management of hip osteoarthritis, including physical therapy, anti-inflammatories, and injections.  Patient was given referral for physical therapy.  Patient is unable to take NSAIDs due to history of prior kidney issues.  She will take Tylenol as needed for pain control.  Patient will follow-up in 2 to 3 months for reevaluation and management.  Patient understanding and in agreement the plan.  All questions answered.\par \par Plan:\par -Physical Therapy\par -Continue Tylenol as needed for pain control\par -Follow up in 2 to 3 months for reevaluation and management

## 2023-04-21 ENCOUNTER — OUTPATIENT (OUTPATIENT)
Dept: OUTPATIENT SERVICES | Facility: HOSPITAL | Age: 58
LOS: 1 days | End: 2023-04-21
Payer: COMMERCIAL

## 2023-04-21 ENCOUNTER — APPOINTMENT (OUTPATIENT)
Dept: MAMMOGRAPHY | Facility: IMAGING CENTER | Age: 58
End: 2023-04-21
Payer: COMMERCIAL

## 2023-04-21 ENCOUNTER — APPOINTMENT (OUTPATIENT)
Dept: ULTRASOUND IMAGING | Facility: IMAGING CENTER | Age: 58
End: 2023-04-21
Payer: COMMERCIAL

## 2023-04-21 ENCOUNTER — APPOINTMENT (OUTPATIENT)
Dept: RADIOLOGY | Facility: IMAGING CENTER | Age: 58
End: 2023-04-21
Payer: COMMERCIAL

## 2023-04-21 DIAGNOSIS — Z00.8 ENCOUNTER FOR OTHER GENERAL EXAMINATION: ICD-10-CM

## 2023-04-21 PROCEDURE — 77063 BREAST TOMOSYNTHESIS BI: CPT | Mod: 26

## 2023-04-21 PROCEDURE — 77067 SCR MAMMO BI INCL CAD: CPT | Mod: 26

## 2023-04-21 PROCEDURE — 76641 ULTRASOUND BREAST COMPLETE: CPT

## 2023-04-21 PROCEDURE — 76641 ULTRASOUND BREAST COMPLETE: CPT | Mod: 26,50

## 2023-04-21 PROCEDURE — 77080 DXA BONE DENSITY AXIAL: CPT | Mod: 26

## 2023-04-21 PROCEDURE — 77080 DXA BONE DENSITY AXIAL: CPT

## 2023-04-21 PROCEDURE — 77067 SCR MAMMO BI INCL CAD: CPT

## 2023-04-21 PROCEDURE — 77063 BREAST TOMOSYNTHESIS BI: CPT

## 2023-05-05 ENCOUNTER — NON-APPOINTMENT (OUTPATIENT)
Age: 58
End: 2023-05-05

## 2023-05-10 ENCOUNTER — NON-APPOINTMENT (OUTPATIENT)
Age: 58
End: 2023-05-10

## 2023-05-10 ENCOUNTER — APPOINTMENT (OUTPATIENT)
Dept: CARDIOLOGY | Facility: CLINIC | Age: 58
End: 2023-05-10
Payer: COMMERCIAL

## 2023-05-10 VITALS
SYSTOLIC BLOOD PRESSURE: 110 MMHG | TEMPERATURE: 98.1 F | DIASTOLIC BLOOD PRESSURE: 80 MMHG | BODY MASS INDEX: 34.96 KG/M2 | OXYGEN SATURATION: 96 % | HEIGHT: 69 IN | WEIGHT: 236 LBS | HEART RATE: 69 BPM

## 2023-05-10 DIAGNOSIS — R53.83 OTHER FATIGUE: ICD-10-CM

## 2023-05-10 DIAGNOSIS — R61 GENERALIZED HYPERHIDROSIS: ICD-10-CM

## 2023-05-10 DIAGNOSIS — M79.672 PAIN IN LEFT FOOT: ICD-10-CM

## 2023-05-10 DIAGNOSIS — E55.9 VITAMIN D DEFICIENCY, UNSPECIFIED: ICD-10-CM

## 2023-05-10 PROCEDURE — 93000 ELECTROCARDIOGRAM COMPLETE: CPT

## 2023-05-10 PROCEDURE — 99214 OFFICE O/P EST MOD 30 MIN: CPT | Mod: 25

## 2023-05-10 NOTE — PHYSICAL EXAM

## 2023-05-10 NOTE — HISTORY OF PRESENT ILLNESS
[FreeTextEntry1] : This is a 58 y/o female with a pmhx of HTN, Asthma, Anemia, LEONARD, and Chronic Back pain who presents today for routine follow-up. Patient reports numbness/tingling of LLE. Patient is following with neuro, s/p EEG, awaiting EMG. She continues to report chronic back pain, undergoing PT. pt also with leg foot pain undersurface of left leg \par Patient denies chest pain, dyspnea, palpitations, dizziness, syncope, changes in bowel/bladder habits or appetite. pt with mild fatigue \par \par

## 2023-05-22 ENCOUNTER — APPOINTMENT (OUTPATIENT)
Dept: PULMONOLOGY | Facility: CLINIC | Age: 58
End: 2023-05-22
Payer: COMMERCIAL

## 2023-05-22 VITALS
BODY MASS INDEX: 34.07 KG/M2 | DIASTOLIC BLOOD PRESSURE: 83 MMHG | HEIGHT: 69 IN | WEIGHT: 230 LBS | HEART RATE: 79 BPM | OXYGEN SATURATION: 96 % | SYSTOLIC BLOOD PRESSURE: 125 MMHG

## 2023-05-22 PROCEDURE — 99214 OFFICE O/P EST MOD 30 MIN: CPT | Mod: 25

## 2023-05-22 PROCEDURE — 94060 EVALUATION OF WHEEZING: CPT

## 2023-05-22 NOTE — DISCUSSION/SUMMARY
[FreeTextEntry1] : Stable Pulmonary Physiology\par LEONARD with significant improvement of CPAP usage- addressed risks /benefits- discussed  benefits re  new RESMED\par Mild asthma need daily compliance with use Symbicort and rinse\par sinus disease\par Off Spiriva\par    Hypertension better controlled\par Proteinuria with history mild renal insufficiency management PMD\par Heme  noted\par continue singulair 10 mg QD\par Symbicort  and prn Proair as noted\par LEONARD- AUTO CPAP  6 - 18 cm H20 RESMED\par  Proair before swimming 2 puffs\par Notify of any wheezing.  Notify if rescue inhaler is needed greater than 2-3 times in any week.  Avoid known triggers.\par Anemia w/u and noted stool negative heme renal\par ADDED  spiriva 2.5 mcg 2 puffs QD- HOLD\par \par Recommendation patient to consider bariatric surgery- did  not proceed\par Do believe her weight contributes to her sensation of shortness of breath\par Referral Cyrus Valiente MD- on hold\par \par f/u mammogram per PMD management\par  Colonoscopy- completed with Dr Birch\par NOTED GI Dr Garza EGD\par f/u RENAl for proteinuria- Med f/u\par cardiology ECHO  4/14/21 noted\par \par Patient compliant with CPAP therapy.  Continue present settings.  Patient with demonstrated clinical benefit with daytime and nocturnal symptomatology improvement.\par \par DEXA  up to date with GYN\par Note has PCP for  overall  management and  will  defer other  medical  issues  to  her  care\par CPAP management visit\par  COVID BiValent booster up to  date\par

## 2023-05-22 NOTE — PROCEDURE
[FreeTextEntry1] : CPAP data compliance adherence through May 21, 2023\par Usage 90%\par Hours average greater than 6\par AutoSet CPAP 6 to 18 cm H2O\par AHI 1.7\par Continue current management\par \par JAVY 5/22/23\par  Flow rates nl\par  ratio 77 nl\par stable pulmonary physiology\par \par Spirometry no bronchodilator February 27, 2023\par Flow rates are normal\par Ratio 76\par Interval improvement of flow rates compared to data of November 21, 2022\par \par Chest x-ray PA lateral 2/27/23\par Normal cardiac size\par Lung fields are clear\par Impression infiltrates pleural effusions or dominant pulmonary nodules\par There is a tiny right lower lobe sub-6 cm well-circumscribed nodule that has been stable dating back to at least March 2019 was consistent with a postinflammatory nodule/granuloma\par \par PFT 11/2/122\par Flow rates nl\par  Mild OAD\par Lung volumes nl\par  DLCO 93 %\par HGB 10.8\par \par Javy 10/10/22\par flow rates nl  range\par  stable\par \par DEXA 10/10/22\par  Left femoral neck nl\par Total Left hip nl\par AP L1- L4 nl\par  normal study f/u 2 - 5 years\par \par CPAP data through October 9, 2022\par 87%\par AutoSet CPAP 6 to 15 cm H2O\par Greater than 7\par AHI 3.8\par \par [place] 8/25/22\par flow rates nl\par stable FEV1\par \par CPAP data compliance 8/24/22\par 97 %\par hours >7\par  AUTO CPAP 6-18 cm H20\par AHI 2.5\par pod occ airleak\par \par CPAP_ RESMED 6/23/22\par pos  airleak\par \par PFT 5/13/22\par Flow rates nl\par Lung Volumes nl\par DLCO 109 %\par HGB 11.2\par \par Sleep study February 25 February 27, 2022\par Severe obstructive sleep apnea positional component\par AHI 33\par Follow-up post treatment with overnight oximetry \par  \par PFT 2/28/22\par Very mild OAD\par TLC 86 %  normal lung volumes\par ERV 25 % sec overweight\par  DLCO  88 %\par HGB 11.9\par \par Chest x-ray PA lateral February 18, 2022\par Normal cardiac size\par Left midlung zone intrapulmonary lymph node versus end on vascular\par Not exclude tiny faint subcentimeter density right lower lobe\par No parenchymal infiltrates\par No pneumothorax\par No pleural effusion\par Roxie mediastinum unremarkable\par Soft tissue bony structures unremarkable\par Comparison to January 29, 2021 chest x-ray no interval change\par Right lower lobe most consistent with a subcentimeter granuloma\par \par PFT 11/12/21\par Normal flow rates\par  lung volumes nl\par  DLCO 86 % pred WNL\par HGB 11.1\par \par PFT 8/20/21\par flow rates nl\par  mild OAD\par TLC 94\par RES IS INC SP CONDUCTANCE DEC\par DLCO 88 %\par interval improvement of flow rates\par \par PFT 5/12/21\par mILD oad\par lUNG vOLUMES NL\par DLCO 80 % wnl\par hgb 11.9\par \par PFT 2/22/21\par Normal  flow rates\par  minimal OAD\par Normal  lung volumes borderline airtrapping\par  Normal DLCO  84 %  pred\par  HGB 11.7\par \par Chest x-ray PA lateral January 29, 2021\par Normal cardiac size\par Clear lungs\par No parenchymal infiltrates pleural effusions with dominant pulmonary nodules left well-circumscribed intrapulmonary lymph node\par Tiny nodularity noted right lower lung zone most consistent with granuloma dating back  2 years\par \par PFT Nov 23 2020\par  Mild OAD\par normal lung volumes\par Normal DLCO\par  HGB 11.4 \par Interval improvement at Flow Rates \par \par NIOX 69 PPD October 22, 2020\par PFT October 22, 2020\par Mild reduction flow rates with a mild obstructive ventilatory impairment\par No significant response to bronchodilator at the FEV1 measuring 7%\par Borderline response at the small airways of 15%\par Lung volumes demonstrate normal total lung capacity\par Air trapping with RV/TLC ratio 38% predicted and a decreased ERV likely secondary to patient increased abdominal girth\par Diffusion normal 100% predicted.\par Hemoglobin 11.4\par Data compared to August 11, 2020 does demonstrate some mild reduction at the flow rates\par \par DEXA 9/17/20\par normal study\par \par 8/11/2020\par NIOX 50\par PFT\par  spirometry normal\par 30% response to bronchodilator at the FEV1\par Normal lung volumes\par Diffusion normal 92% predicted.\par Positive bronchodilator response\par \par Chest x-ray PA lateral March 9, 2020\par Normal cardiac size\par Clear lung fields without parenchymal infiltrates pleural effusions dominant pulmonary nodules\par Tissue bony structures normal\par Roxie mediastinum normal\par Impression clear lungs\par \par  CPAP DATA data    3/14/22\par usage 93 %\par Hours > 6\par Auto CPAp 6 - 18 cm h20\par AHI 1.9\par \par PFIZER completed vaccine\par \par Echocardiogram August 14, 2021\par Mild concentric LVH\par Impaired LV relaxation with normal filling pressure\par Myxomatous mitral valve\par Mild to moderate mitral regurgitation\par Mild tricuspid regurgitation\par Normal global LV systolic function\par Stress test treadmill August 14, 2021\par No reported ischemic changes\par No arrhythmia noted\par \par Flu vaccination intramuscular October 10, 2022\par

## 2023-05-22 NOTE — HISTORY OF PRESENT ILLNESS
[Stable] : are stable [None] : ~He/She~ has no significant interval events [Difficulty Breathing During Exertion] : stable dyspnea on exertion [Feelings Of Weakness On Exertion] : stable exercise intolerance [Cough] : denies coughing [Wheezing] : denies wheezing [Regional Soft Tissue Swelling Both Lower Extremities] : denies lower extremity edema [Chest Pain Or Discomfort] : denies chest pain [Fever] : denies fever [Obstructive Sleep Apnea] : obstructive sleep apnea [Date: ___] : Date of most recent diagnostic polysomnogram: [unfilled] [AHI: ___ per hour] : Apnea-hypopnea index:  [unfilled] per hour [Wt Gain ___ Lbs] : no recent weight gain [Wt Loss ___ Lbs] : no recent weight loss [Oxygen] : the patient uses no supplemental oxygen [Nocturnal Oxygen] : The patient does not use nocturnal oxygen [FreeTextEntry1] : sinus sxs chronic\par occ use ERNESTO\par COVID Rapid AG neg\par ? allergy component better controlled with Rx nasal + generic singulair\par HEME\par 56yo F with PMH of asthma, morbid obesity, sleep apnea on CPAP, HTN, proteinuria. Patient was referred for evaluation and management of anemia. \par Laboratory studies CBC 6/24/19 : WBC 4.4, Hb 11.7, RBC 3.87, MCV 92.5, Hct 35.9%, RDW 11.9%, PLTs 257. \par 6/24/19 iron studies were normal\par Ferritin 230\par Vitamin B 12 429\par Folate 16\par Light chain kappa 2.89 mildly elevated but kappa/ lambda ratio were normal. \par Immunofixation was negative for monoclonal ban\par \par no active asthma sxs\par no inc use ERNESTO\par  issue with CPAP use \par does use So Clean but was not compliant - advised not to use\par no viral\par  no travel\par non sp  lumbar back pain\par Notes some component  left hip  pain with numbness  radiation left\par HEME Noted\par GI noted

## 2023-06-04 ENCOUNTER — RX RENEWAL (OUTPATIENT)
Age: 58
End: 2023-06-04

## 2023-06-14 ENCOUNTER — APPOINTMENT (OUTPATIENT)
Dept: INTERNAL MEDICINE | Facility: CLINIC | Age: 58
End: 2023-06-14

## 2023-07-12 ENCOUNTER — RX RENEWAL (OUTPATIENT)
Age: 58
End: 2023-07-12

## 2023-07-27 ENCOUNTER — APPOINTMENT (OUTPATIENT)
Dept: PULMONOLOGY | Facility: CLINIC | Age: 58
End: 2023-07-27
Payer: COMMERCIAL

## 2023-07-27 VITALS — DIASTOLIC BLOOD PRESSURE: 77 MMHG | OXYGEN SATURATION: 100 % | SYSTOLIC BLOOD PRESSURE: 111 MMHG | HEART RATE: 98 BPM

## 2023-07-27 DIAGNOSIS — J32.9 CHRONIC SINUSITIS, UNSPECIFIED: ICD-10-CM

## 2023-07-27 PROCEDURE — 94060 EVALUATION OF WHEEZING: CPT

## 2023-07-27 PROCEDURE — 99214 OFFICE O/P EST MOD 30 MIN: CPT | Mod: 25

## 2023-07-27 RX ORDER — ALBUTEROL SULFATE 90 UG/1
108 (90 BASE) INHALANT RESPIRATORY (INHALATION)
Qty: 8.5 | Refills: 1 | Status: ACTIVE | COMMUNITY
Start: 2017-12-20 | End: 1900-01-01

## 2023-07-27 NOTE — HISTORY OF PRESENT ILLNESS
[Stable] : are stable [None] : ~He/She~ has no significant interval events [Difficulty Breathing During Exertion] : stable dyspnea on exertion [Feelings Of Weakness On Exertion] : stable exercise intolerance [Cough] : denies coughing [Wheezing] : denies wheezing [Regional Soft Tissue Swelling Both Lower Extremities] : denies lower extremity edema [Chest Pain Or Discomfort] : denies chest pain [Fever] : denies fever [Obstructive Sleep Apnea] : obstructive sleep apnea [Date: ___] : Date of most recent diagnostic polysomnogram: [unfilled] [AHI: ___ per hour] : Apnea-hypopnea index:  [unfilled] per hour [Wt Gain ___ Lbs] : no recent weight gain [Wt Loss ___ Lbs] : no recent weight loss [Oxygen] : the patient uses no supplemental oxygen [Nocturnal Oxygen] : The patient does not use nocturnal oxygen [FreeTextEntry1] : sinus sxs chronic more active nasal sinus  congestion with use Astelin\par some chest congestion\par occ use ERNESTO\par not using symbicort thought it was equivalent to the singulair\par \par ? allergy component better controlled with Rx nasal + generic singulair\par HEME\par 56yo F with PMH of asthma, morbid obesity, sleep apnea on CPAP, HTN, proteinuria. Patient was referred for evaluation and management of anemia. \par Laboratory studies CBC 6/24/19 : WBC 4.4, Hb 11.7, RBC 3.87, MCV 92.5, Hct 35.9%, RDW 11.9%, PLTs 257. \par 6/24/19 iron studies were normal\par Ferritin 230\par Vitamin B 12 429\par Folate 16\par Light chain kappa 2.89 mildly elevated but kappa/ lambda ratio were normal. \par Immunofixation was negative for monoclonal ban\par \par no active asthma sxs\par no inc use ERNESTO\par  issue with CPAP use \par does use So Clean but was not compliant - advised not to use\par no viral\par  no travel\par non sp  lumbar back pain\par Notes some component  left hip  pain with numbness  radiation left\par HEME Noted\par GI noted

## 2023-07-27 NOTE — DISCUSSION/SUMMARY
[FreeTextEntry1] : Stable Pulmonary Physiology\par LEONARD with significant improvement of CPAP usage- addressed risks /benefits- discussed  benefits re  new RESMED\par Mild asthma need daily compliance with use Symbicort and rinse\par sinus disease with active sxs\par Off Spiriva\par    Hypertension better controlled\par Proteinuria with history mild renal insufficiency management PMD\par Heme  noted\par continue singulair 10 mg QD\par Symbicort  and prn Proair as noted\par LEONARD- AUTO CPAP  6 - 18 cm H20 RESMED\par  Proair before swimming 2 puffs\par Notify of any wheezing.  Notify if rescue inhaler is needed greater than 2-3 times in any week.  Avoid known triggers.\par Anemia w/u and noted stool negative heme renal\par ADDED  spiriva 2.5 mcg 2 puffs QD- HOLD\par \par Recommendation patient to consider bariatric surgery- did  not proceed\par Do believe her weight contributes to her sensation of shortness of breath\par Referral Cyrus Valiente MD- on hold\par \par f/u mammogram per PMD management\par  Colonoscopy- completed with Dr Birch\par NOTED GI Dr Garza EGD\par f/u RENAl for proteinuria- Med f/u\par cardiology ECHO  4/14/21 noted\par \par Patient compliant with CPAP therapy.  Continue present settings.  Patient with demonstrated clinical benefit with daytime and nocturnal symptomatology improvement.\par \par DEXA  up to date with GYN\par Note has PCP for  overall  management and  will  defer other  medical  issues  to  her  care\par CPAP management visit\par  COVID BiValent booster up to  date\par \par \par Sinus disease  medrol + Ceftin

## 2023-07-27 NOTE — PROCEDURE
[FreeTextEntry1] : Javy 7/27/23 Nl spirometry\par \par CPAP data compliance adherence through May 21, 2023\par Usage 90%\par Hours average greater than 6\par AutoSet CPAP 6 to 18 cm H2O\par AHI 1.7\par Continue current management\par \par JAVY 5/22/23\par  Flow rates nl\par  ratio 77 nl\par stable pulmonary physiology\par \par Spirometry no bronchodilator February 27, 2023\par Flow rates are normal\par Ratio 76\par Interval improvement of flow rates compared to data of November 21, 2022\par \par Chest x-ray PA lateral 2/27/23\par Normal cardiac size\par Lung fields are clear\par Impression infiltrates pleural effusions or dominant pulmonary nodules\par There is a tiny right lower lobe sub-6 cm well-circumscribed nodule that has been stable dating back to at least March 2019 was consistent with a postinflammatory nodule/granuloma\par \par PFT 11/2/122\par Flow rates nl\par  Mild OAD\par Lung volumes nl\par  DLCO 93 %\par HGB 10.8\par \par [place] 10/10/22\par flow rates nl  range\par  stable\par \par DEXA 10/10/22\par  Left femoral neck nl\par Total Left hip nl\par AP L1- L4 nl\par  normal study f/u 2 - 5 years\par \par CPAP data through October 9, 2022\par 87%\par AutoSet CPAP 6 to 15 cm H2O\par Greater than 7\par AHI 3.8\par \par [place] 8/25/22\par flow rates nl\par stable FEV1\par \par CPAP data compliance 8/24/22\par 97 %\par hours >7\par  AUTO CPAP 6-18 cm H20\par AHI 2.5\par pod occ airleak\par \par CPAP_ RESMED 6/23/22\par pos  airleak\par \par PFT 5/13/22\par Flow rates nl\par Lung Volumes nl\par DLCO 109 %\par HGB 11.2\par \par Sleep study February 25 February 27, 2022\par Severe obstructive sleep apnea positional component\par AHI 33\par Follow-up post treatment with overnight oximetry \par  \par PFT 2/28/22\par Very mild OAD\par TLC 86 %  normal lung volumes\par ERV 25 % sec overweight\par  DLCO  88 %\par HGB 11.9\par \par Chest x-ray PA lateral February 18, 2022\par Normal cardiac size\par Left midlung zone intrapulmonary lymph node versus end on vascular\par Not exclude tiny faint subcentimeter density right lower lobe\par No parenchymal infiltrates\par No pneumothorax\par No pleural effusion\par Roxie mediastinum unremarkable\par Soft tissue bony structures unremarkable\par Comparison to January 29, 2021 chest x-ray no interval change\par Right lower lobe most consistent with a subcentimeter granuloma\par \par PFT 11/12/21\par Normal flow rates\par  lung volumes nl\par  DLCO 86 % pred WNL\par HGB 11.1\par \par PFT 8/20/21\par flow rates nl\par  mild OAD\par TLC 94\par RES IS INC SP CONDUCTANCE DEC\par DLCO 88 %\par interval improvement of flow rates\par \par PFT 5/12/21\par mILD oad\par lUNG vOLUMES NL\par DLCO 80 % wnl\par hgb 11.9\par \par PFT 2/22/21\par Normal  flow rates\par  minimal OAD\par Normal  lung volumes borderline airtrapping\par  Normal DLCO  84 %  pred\par  HGB 11.7\par \par Chest x-ray PA lateral January 29, 2021\par Normal cardiac size\par Clear lungs\par No parenchymal infiltrates pleural effusions with dominant pulmonary nodules left well-circumscribed intrapulmonary lymph node\par Tiny nodularity noted right lower lung zone most consistent with granuloma dating back  2 years\par \par PFT Nov 23 2020\par  Mild OAD\par normal lung volumes\par Normal DLCO\par  HGB 11.4 \par Interval improvement at Flow Rates \par \par NIOX 69 PPD October 22, 2020\par PFT October 22, 2020\par Mild reduction flow rates with a mild obstructive ventilatory impairment\par No significant response to bronchodilator at the FEV1 measuring 7%\par Borderline response at the small airways of 15%\par Lung volumes demonstrate normal total lung capacity\par Air trapping with RV/TLC ratio 38% predicted and a decreased ERV likely secondary to patient increased abdominal girth\par Diffusion normal 100% predicted.\par Hemoglobin 11.4\par Data compared to August 11, 2020 does demonstrate some mild reduction at the flow rates\par \par DEXA 9/17/20\par normal study\par \par 8/11/2020\par NIOX 50\par PFT\par  spirometry normal\par 30% response to bronchodilator at the FEV1\par Normal lung volumes\par Diffusion normal 92% predicted.\par Positive bronchodilator response\par \par Chest x-ray PA lateral March 9, 2020\par Normal cardiac size\par Clear lung fields without parenchymal infiltrates pleural effusions dominant pulmonary nodules\par Tissue bony structures normal\par Roxie mediastinum normal\par Impression clear lungs\par \par  CPAP DATA data    3/14/22\par usage 93 %\par Hours > 6\par Auto CPAp 6 - 18 cm h20\par AHI 1.9\par \par PFIZER completed vaccine\par \par Echocardiogram August 14, 2021\par Mild concentric LVH\par Impaired LV relaxation with normal filling pressure\par Myxomatous mitral valve\par Mild to moderate mitral regurgitation\par Mild tricuspid regurgitation\par Normal global LV systolic function\par Stress test treadmill August 14, 2021\par No reported ischemic changes\par No arrhythmia noted\par \par Flu vaccination intramuscular October 10, 2022\par

## 2023-08-12 ENCOUNTER — RX RENEWAL (OUTPATIENT)
Age: 58
End: 2023-08-12

## 2023-08-28 ENCOUNTER — RX RENEWAL (OUTPATIENT)
Age: 58
End: 2023-08-28

## 2023-09-01 ENCOUNTER — APPOINTMENT (OUTPATIENT)
Dept: PULMONOLOGY | Facility: CLINIC | Age: 58
End: 2023-09-01
Payer: COMMERCIAL

## 2023-09-01 VITALS — OXYGEN SATURATION: 99 % | DIASTOLIC BLOOD PRESSURE: 78 MMHG | HEART RATE: 106 BPM | SYSTOLIC BLOOD PRESSURE: 127 MMHG

## 2023-09-01 PROCEDURE — 94010 BREATHING CAPACITY TEST: CPT

## 2023-09-01 PROCEDURE — 99214 OFFICE O/P EST MOD 30 MIN: CPT | Mod: 25

## 2023-09-01 NOTE — PROCEDURE
[FreeTextEntry1] : JAVY  9/1/23  minimal OAD  ratio 76  CPAP data compliance adherence through August 31, 2023 Usage 93% Hours average greater than 7 AutoSet CPAP 6 to 18 cm H2O AHI 1.3 Continue current management  JAVY 5/22/23  Flow rates nl  ratio 77 nl stable pulmonary physiology  Spirometry no bronchodilator February 27, 2023 Flow rates are normal Ratio 76 Interval improvement of flow rates compared to data of November 21, 2022  Chest x-ray PA lateral 2/27/23 Normal cardiac size Lung fields are clear Impression infiltrates pleural effusions or dominant pulmonary nodules There is a tiny right lower lobe sub-6 cm well-circumscribed nodule that has been stable dating back to at least March 2019 was consistent with a postinflammatory nodule/granuloma  PFT 11/2/122 Flow rates nl  Mild OAD Lung volumes nl  DLCO 93 % HGB 10.8  [place] 10/10/22 flow rates nl  range  stable  DEXA 10/10/22  Left femoral neck nl Total Left hip nl AP L1- L4 nl  normal study f/u 2 - 5 years  CPAP data through October 9, 2022 87% AutoSet CPAP 6 to 15 cm H2O Greater than 7 AHI 3.8  [place] 8/25/22 flow rates nl stable FEV1  CPAP data compliance 8/24/22 97 % hours >7  AUTO CPAP 6-18 cm H20 AHI 2.5 pod occ airleak  CPAP_ RESMED 6/23/22 pos  airleak  PFT 5/13/22 Flow rates nl Lung Volumes nl DLCO 109 % HGB 11.2  Sleep study February 25 February 27, 2022 Severe obstructive sleep apnea positional component AHI 33 Follow-up post treatment with overnight oximetry    PFT 2/28/22 Very mild OAD TLC 86 %  normal lung volumes ERV 25 % sec overweight  DLCO  88 % HGB 11.9  Chest x-ray PA lateral February 18, 2022 Normal cardiac size Left midlung zone intrapulmonary lymph node versus end on vascular Not exclude tiny faint subcentimeter density right lower lobe No parenchymal infiltrates No pneumothorax No pleural effusion Roxie mediastinum unremarkable Soft tissue bony structures unremarkable Comparison to January 29, 2021 chest x-ray no interval change Right lower lobe most consistent with a subcentimeter granuloma  PFT 11/12/21 Normal flow rates  lung volumes nl  DLCO 86 % pred WNL HGB 11.1  PFT 8/20/21 flow rates nl  mild OAD TLC 94 RES IS INC SP CONDUCTANCE DEC DLCO 88 % interval improvement of flow rates  PFT 5/12/21 mILD oad lUNG vOLUMES NL DLCO 80 % wnl hgb 11.9  PFT 2/22/21 Normal  flow rates  minimal OAD Normal  lung volumes borderline airtrapping  Normal DLCO  84 %  pred  HGB 11.7  Chest x-ray PA lateral January 29, 2021 Normal cardiac size Clear lungs No parenchymal infiltrates pleural effusions with dominant pulmonary nodules left well-circumscribed intrapulmonary lymph node Tiny nodularity noted right lower lung zone most consistent with granuloma dating back  2 years  PFT Nov 23 2020  Mild OAD normal lung volumes Normal DLCO  HGB 11.4  Interval improvement at Flow Rates   NIOX 69 PPD October 22, 2020 PFT October 22, 2020 Mild reduction flow rates with a mild obstructive ventilatory impairment No significant response to bronchodilator at the FEV1 measuring 7% Borderline response at the small airways of 15% Lung volumes demonstrate normal total lung capacity Air trapping with RV/TLC ratio 38% predicted and a decreased ERV likely secondary to patient increased abdominal girth Diffusion normal 100% predicted. Hemoglobin 11.4 Data compared to August 11, 2020 does demonstrate some mild reduction at the flow rates  DEXA 9/17/20 normal study  8/11/2020 NIOX 50 PFT  spirometry normal 30% response to bronchodilator at the FEV1 Normal lung volumes Diffusion normal 92% predicted. Positive bronchodilator response  Chest x-ray PA lateral March 9, 2020 Normal cardiac size Clear lung fields without parenchymal infiltrates pleural effusions dominant pulmonary nodules Tissue bony structures normal Roxie mediastinum normal Impression clear lungs   CPAP DATA data    3/14/22 usage 93 % Hours > 6 Auto CPAp 6 - 18 cm h20 AHI 1.9  PFIZER completed vaccine  Echocardiogram August 14, 2021 Mild concentric LVH Impaired LV relaxation with normal filling pressure Myxomatous mitral valve Mild to moderate mitral regurgitation Mild tricuspid regurgitation Normal global LV systolic function Stress test treadmill August 14, 2021 No reported ischemic changes No arrhythmia noted  Flu vaccination intramuscular October 10, 2022

## 2023-09-01 NOTE — DISCUSSION/SUMMARY
[FreeTextEntry1] : Stable Pulmonary Physiology LEONARD with significant improvement of CPAP usage- addressed risks /benefits- discussed  benefits re  new RESMED Mild asthma need daily compliance with use Symbicort and rinse sinus disease with active sxs Off Spiriva    Hypertension better controlled Proteinuria with history mild renal insufficiency management PMD Heme  noted continue singulair 10 mg QD Symbicort  and prn Proair as noted LEONARD- AUTO CPAP  6 - 18 cm H20 RESMED  Proair before swimming 2 puffs Notify of any wheezing.  Notify if rescue inhaler is needed greater than 2-3 times in any week.  Avoid known triggers. Anemia w/u and noted stool negative heme renal ADDED  spiriva 2.5 mcg 2 puffs QD- HOLD  Recommendation patient to consider bariatric surgery- did  not proceed Do believe her weight contributes to her sensation of shortness of breath Referral Cyrus Valiente MD- on hold  f/u mammogram per PMD management  Colonoscopy- completed with Dr Birch NOTED GI Dr Garza EGD f/u RENAl for proteinuria- Med f/u cardiology ECHO  4/14/21 noted  Patient compliant with CPAP therapy.  Continue present settings.  Patient with demonstrated clinical benefit with daytime and nocturnal symptomatology improvement.  DEXA  up to date with GYN Note has PCP for  overall  management and  will  defer other  medical  issues  to  her  care CPAP management visit  COVID BiValent booster up to  date  Sinus disease  medrol + Ceftin

## 2023-09-06 ENCOUNTER — APPOINTMENT (OUTPATIENT)
Dept: CARDIOLOGY | Facility: CLINIC | Age: 58
End: 2023-09-06
Payer: COMMERCIAL

## 2023-09-06 PROCEDURE — 93306 TTE W/DOPPLER COMPLETE: CPT

## 2023-09-11 ENCOUNTER — RX RENEWAL (OUTPATIENT)
Age: 58
End: 2023-09-11

## 2023-09-14 ENCOUNTER — APPOINTMENT (OUTPATIENT)
Dept: CARDIOLOGY | Facility: CLINIC | Age: 58
End: 2023-09-14
Payer: COMMERCIAL

## 2023-09-14 VITALS
WEIGHT: 243.38 LBS | OXYGEN SATURATION: 99 % | HEIGHT: 69 IN | DIASTOLIC BLOOD PRESSURE: 78 MMHG | HEART RATE: 72 BPM | TEMPERATURE: 97.9 F | BODY MASS INDEX: 36.05 KG/M2 | SYSTOLIC BLOOD PRESSURE: 115 MMHG

## 2023-09-14 DIAGNOSIS — N18.9 CHRONIC KIDNEY DISEASE, UNSPECIFIED: ICD-10-CM

## 2023-09-14 PROCEDURE — 99214 OFFICE O/P EST MOD 30 MIN: CPT | Mod: 25

## 2023-09-14 PROCEDURE — 93000 ELECTROCARDIOGRAM COMPLETE: CPT

## 2023-09-15 ENCOUNTER — RX RENEWAL (OUTPATIENT)
Age: 58
End: 2023-09-15

## 2023-09-15 ENCOUNTER — TRANSCRIPTION ENCOUNTER (OUTPATIENT)
Age: 58
End: 2023-09-15

## 2023-09-18 ENCOUNTER — TRANSCRIPTION ENCOUNTER (OUTPATIENT)
Age: 58
End: 2023-09-18

## 2023-09-20 ENCOUNTER — APPOINTMENT (OUTPATIENT)
Dept: CARDIOLOGY | Facility: CLINIC | Age: 58
End: 2023-09-20
Payer: COMMERCIAL

## 2023-09-20 PROCEDURE — A9500: CPT

## 2023-09-20 PROCEDURE — 78452 HT MUSCLE IMAGE SPECT MULT: CPT

## 2023-09-20 PROCEDURE — 93015 CV STRESS TEST SUPVJ I&R: CPT

## 2023-09-25 ENCOUNTER — RX RENEWAL (OUTPATIENT)
Age: 58
End: 2023-09-25

## 2023-10-03 ENCOUNTER — RX RENEWAL (OUTPATIENT)
Age: 58
End: 2023-10-03

## 2023-10-04 ENCOUNTER — APPOINTMENT (OUTPATIENT)
Dept: GASTROENTEROLOGY | Facility: CLINIC | Age: 58
End: 2023-10-04
Payer: COMMERCIAL

## 2023-10-04 VITALS
WEIGHT: 241 LBS | SYSTOLIC BLOOD PRESSURE: 122 MMHG | HEIGHT: 69 IN | OXYGEN SATURATION: 97 % | HEART RATE: 94 BPM | RESPIRATION RATE: 16 BRPM | BODY MASS INDEX: 35.7 KG/M2 | TEMPERATURE: 97.2 F | DIASTOLIC BLOOD PRESSURE: 78 MMHG

## 2023-10-04 DIAGNOSIS — K59.09 OTHER CONSTIPATION: ICD-10-CM

## 2023-10-04 PROCEDURE — 99214 OFFICE O/P EST MOD 30 MIN: CPT

## 2023-10-04 RX ORDER — LINACLOTIDE 72 UG/1
72 CAPSULE, GELATIN COATED ORAL
Qty: 30 | Refills: 3 | Status: DISCONTINUED | COMMUNITY
Start: 2023-09-15 | End: 2023-10-04

## 2023-10-04 RX ORDER — LINACLOTIDE 290 UG/1
290 CAPSULE, GELATIN COATED ORAL
Qty: 30 | Refills: 2 | Status: ACTIVE | COMMUNITY
Start: 2023-10-04 | End: 1900-01-01

## 2023-10-05 ENCOUNTER — TRANSCRIPTION ENCOUNTER (OUTPATIENT)
Age: 58
End: 2023-10-05

## 2023-10-10 ENCOUNTER — RX RENEWAL (OUTPATIENT)
Age: 58
End: 2023-10-10

## 2023-10-10 ENCOUNTER — TRANSCRIPTION ENCOUNTER (OUTPATIENT)
Age: 58
End: 2023-10-10

## 2023-10-12 ENCOUNTER — APPOINTMENT (OUTPATIENT)
Dept: ORTHOPEDIC SURGERY | Facility: CLINIC | Age: 58
End: 2023-10-12
Payer: COMMERCIAL

## 2023-10-12 VITALS
WEIGHT: 237 LBS | SYSTOLIC BLOOD PRESSURE: 124 MMHG | BODY MASS INDEX: 35.1 KG/M2 | HEIGHT: 69 IN | DIASTOLIC BLOOD PRESSURE: 85 MMHG

## 2023-10-12 PROCEDURE — 72220 X-RAY EXAM SACRUM TAILBONE: CPT

## 2023-10-12 PROCEDURE — 99214 OFFICE O/P EST MOD 30 MIN: CPT

## 2023-10-12 RX ORDER — TIZANIDINE 2 MG/1
2 TABLET ORAL EVERY 6 HOURS
Qty: 24 | Refills: 0 | Status: ACTIVE | COMMUNITY
Start: 2023-10-12 | End: 1900-01-01

## 2023-10-12 RX ORDER — LIDOCAINE 5% 700 MG/1
5 PATCH TOPICAL
Qty: 12 | Refills: 1 | Status: ACTIVE | COMMUNITY
Start: 2023-10-12 | End: 1900-01-01

## 2023-10-16 ENCOUNTER — APPOINTMENT (OUTPATIENT)
Dept: PULMONOLOGY | Facility: CLINIC | Age: 58
End: 2023-10-16
Payer: COMMERCIAL

## 2023-10-16 VITALS — SYSTOLIC BLOOD PRESSURE: 118 MMHG | DIASTOLIC BLOOD PRESSURE: 76 MMHG | OXYGEN SATURATION: 99 % | HEART RATE: 82 BPM

## 2023-10-16 DIAGNOSIS — Z23 ENCOUNTER FOR IMMUNIZATION: ICD-10-CM

## 2023-10-16 PROCEDURE — 90686 IIV4 VACC NO PRSV 0.5 ML IM: CPT

## 2023-10-16 PROCEDURE — ZZZZZ: CPT

## 2023-10-16 PROCEDURE — 99214 OFFICE O/P EST MOD 30 MIN: CPT | Mod: 25

## 2023-10-16 PROCEDURE — 94727 GAS DIL/WSHOT DETER LNG VOL: CPT

## 2023-10-16 PROCEDURE — 94060 EVALUATION OF WHEEZING: CPT

## 2023-10-16 PROCEDURE — G0008: CPT

## 2023-10-16 PROCEDURE — 94729 DIFFUSING CAPACITY: CPT

## 2023-10-17 ENCOUNTER — RX RENEWAL (OUTPATIENT)
Age: 58
End: 2023-10-17

## 2023-11-06 ENCOUNTER — RX RENEWAL (OUTPATIENT)
Age: 58
End: 2023-11-06

## 2023-11-08 ENCOUNTER — RX RENEWAL (OUTPATIENT)
Age: 58
End: 2023-11-08

## 2023-11-10 ENCOUNTER — APPOINTMENT (OUTPATIENT)
Dept: NEPHROLOGY | Facility: CLINIC | Age: 58
End: 2023-11-10
Payer: COMMERCIAL

## 2023-11-10 VITALS — DIASTOLIC BLOOD PRESSURE: 66 MMHG | SYSTOLIC BLOOD PRESSURE: 110 MMHG

## 2023-11-10 VITALS
HEIGHT: 69 IN | SYSTOLIC BLOOD PRESSURE: 138 MMHG | BODY MASS INDEX: 34.66 KG/M2 | RESPIRATION RATE: 15 BRPM | OXYGEN SATURATION: 99 % | HEART RATE: 75 BPM | TEMPERATURE: 97 F | WEIGHT: 234 LBS | DIASTOLIC BLOOD PRESSURE: 87 MMHG

## 2023-11-10 DIAGNOSIS — R80.9 PROTEINURIA, UNSPECIFIED: ICD-10-CM

## 2023-11-10 PROCEDURE — 99203 OFFICE O/P NEW LOW 30 MIN: CPT | Mod: GC

## 2023-11-10 RX ORDER — HYDROCORTISONE 2.5% 25 MG/G
2.5 CREAM TOPICAL
Qty: 60 | Refills: 4 | Status: DISCONTINUED | COMMUNITY
Start: 2022-10-03 | End: 2023-11-10

## 2023-11-10 RX ORDER — HYDROCORTISONE ACETATE 25 MG/1
25 SUPPOSITORY RECTAL TWICE DAILY
Qty: 10 | Refills: 2 | Status: DISCONTINUED | COMMUNITY
Start: 2020-01-31 | End: 2023-11-10

## 2023-11-10 RX ORDER — ERGOCALCIFEROL 1.25 MG/1
1.25 MG CAPSULE ORAL
Qty: 8 | Refills: 0 | Status: DISCONTINUED | COMMUNITY
Start: 2022-01-03 | End: 2023-11-10

## 2023-11-10 RX ORDER — METHYLPREDNISOLONE 4 MG/1
4 TABLET ORAL
Qty: 1 | Refills: 0 | Status: DISCONTINUED | COMMUNITY
Start: 2023-07-27 | End: 2023-11-10

## 2023-11-10 RX ORDER — CYCLOBENZAPRINE HYDROCHLORIDE 10 MG/1
10 TABLET, FILM COATED ORAL
Qty: 10 | Refills: 0 | Status: DISCONTINUED | COMMUNITY
Start: 2022-08-25 | End: 2023-11-10

## 2023-11-10 RX ORDER — TIZANIDINE 2 MG/1
2 TABLET ORAL
Qty: 30 | Refills: 1 | Status: DISCONTINUED | COMMUNITY
Start: 2022-12-05 | End: 2023-11-10

## 2023-11-10 RX ORDER — BUDESONIDE AND FORMOTEROL FUMARATE DIHYDRATE 160; 4.5 UG/1; UG/1
160-4.5 AEROSOL RESPIRATORY (INHALATION)
Qty: 1 | Refills: 3 | Status: DISCONTINUED | COMMUNITY
Start: 2018-08-21 | End: 2023-11-10

## 2023-11-10 RX ORDER — TIZANIDINE 2 MG/1
2 TABLET ORAL EVERY 6 HOURS
Qty: 56 | Refills: 0 | Status: DISCONTINUED | COMMUNITY
Start: 2022-10-31 | End: 2023-11-10

## 2023-11-10 RX ORDER — CEFUROXIME AXETIL 500 MG/1
500 TABLET ORAL
Qty: 14 | Refills: 0 | Status: DISCONTINUED | COMMUNITY
Start: 2023-07-27 | End: 2023-11-10

## 2023-11-10 RX ORDER — DICLOFENAC SODIUM 1% 10 MG/G
1 GEL TOPICAL
Qty: 1 | Refills: 0 | Status: DISCONTINUED | COMMUNITY
Start: 2023-05-10 | End: 2023-11-10

## 2023-11-13 ENCOUNTER — APPOINTMENT (OUTPATIENT)
Dept: ORTHOPEDIC SURGERY | Facility: CLINIC | Age: 58
End: 2023-11-13
Payer: COMMERCIAL

## 2023-11-13 ENCOUNTER — NON-APPOINTMENT (OUTPATIENT)
Age: 58
End: 2023-11-13

## 2023-11-13 VITALS
BODY MASS INDEX: 34.66 KG/M2 | WEIGHT: 234 LBS | HEIGHT: 69 IN | DIASTOLIC BLOOD PRESSURE: 81 MMHG | SYSTOLIC BLOOD PRESSURE: 115 MMHG

## 2023-11-13 PROCEDURE — 99213 OFFICE O/P EST LOW 20 MIN: CPT

## 2023-11-13 RX ORDER — LIDOCAINE 5% 700 MG/1
5 PATCH TOPICAL
Qty: 30 | Refills: 2 | Status: ACTIVE | COMMUNITY
Start: 2023-11-13 | End: 1900-01-01

## 2023-11-15 LAB
CREAT SPEC-SCNC: 39 MG/DL
CREAT/PROT UR: NORMAL RATIO
PROT UR-MCNC: <4 MG/DL

## 2023-11-25 ENCOUNTER — RX RENEWAL (OUTPATIENT)
Age: 58
End: 2023-11-25

## 2023-11-27 ENCOUNTER — APPOINTMENT (OUTPATIENT)
Dept: PULMONOLOGY | Facility: CLINIC | Age: 58
End: 2023-11-27
Payer: COMMERCIAL

## 2023-11-27 VITALS — SYSTOLIC BLOOD PRESSURE: 151 MMHG | OXYGEN SATURATION: 100 % | HEART RATE: 76 BPM | DIASTOLIC BLOOD PRESSURE: 97 MMHG

## 2023-11-27 PROCEDURE — 94060 EVALUATION OF WHEEZING: CPT

## 2023-11-27 PROCEDURE — 99214 OFFICE O/P EST MOD 30 MIN: CPT | Mod: 25

## 2023-11-27 RX ORDER — METHYLPREDNISOLONE 4 MG/1
4 TABLET ORAL
Qty: 1 | Refills: 0 | Status: ACTIVE | COMMUNITY
Start: 2023-11-27 | End: 1900-01-01

## 2023-11-27 RX ORDER — AZITHROMYCIN 250 MG/1
250 TABLET, FILM COATED ORAL
Qty: 1 | Refills: 0 | Status: ACTIVE | COMMUNITY
Start: 2023-11-27 | End: 1900-01-01

## 2023-11-27 RX ORDER — METHYLPREDNISOLONE 4 MG/1
4 TABLET ORAL
Qty: 1 | Refills: 0 | Status: DISCONTINUED | COMMUNITY
Start: 2023-11-13 | End: 2023-11-27

## 2023-11-30 ENCOUNTER — APPOINTMENT (OUTPATIENT)
Dept: CARDIOLOGY | Facility: CLINIC | Age: 58
End: 2023-11-30

## 2023-12-04 ENCOUNTER — OUTPATIENT (OUTPATIENT)
Dept: OUTPATIENT SERVICES | Facility: HOSPITAL | Age: 58
LOS: 1 days | End: 2023-12-04
Payer: COMMERCIAL

## 2023-12-04 ENCOUNTER — APPOINTMENT (OUTPATIENT)
Dept: MRI IMAGING | Facility: IMAGING CENTER | Age: 58
End: 2023-12-04
Payer: COMMERCIAL

## 2023-12-04 DIAGNOSIS — M54.16 RADICULOPATHY, LUMBAR REGION: ICD-10-CM

## 2023-12-04 DIAGNOSIS — Z00.8 ENCOUNTER FOR OTHER GENERAL EXAMINATION: ICD-10-CM

## 2023-12-04 PROCEDURE — 72148 MRI LUMBAR SPINE W/O DYE: CPT

## 2023-12-04 PROCEDURE — 72148 MRI LUMBAR SPINE W/O DYE: CPT | Mod: 26

## 2023-12-06 ENCOUNTER — APPOINTMENT (OUTPATIENT)
Dept: CARDIOLOGY | Facility: CLINIC | Age: 58
End: 2023-12-06
Payer: COMMERCIAL

## 2023-12-06 ENCOUNTER — LABORATORY RESULT (OUTPATIENT)
Age: 58
End: 2023-12-06

## 2023-12-06 VITALS
WEIGHT: 232.19 LBS | HEIGHT: 69 IN | OXYGEN SATURATION: 99 % | TEMPERATURE: 97.6 F | SYSTOLIC BLOOD PRESSURE: 100 MMHG | BODY MASS INDEX: 34.39 KG/M2 | RESPIRATION RATE: 16 BRPM | HEART RATE: 73 BPM | DIASTOLIC BLOOD PRESSURE: 70 MMHG

## 2023-12-06 DIAGNOSIS — R14.0 ABDOMINAL DISTENSION (GASEOUS): ICD-10-CM

## 2023-12-06 DIAGNOSIS — Z13.228 ENCOUNTER FOR SCREENING FOR OTHER METABOLIC DISORDERS: ICD-10-CM

## 2023-12-06 DIAGNOSIS — R82.90 UNSPECIFIED ABNORMAL FINDINGS IN URINE: ICD-10-CM

## 2023-12-06 DIAGNOSIS — R79.9 ABNORMAL FINDING OF BLOOD CHEMISTRY, UNSPECIFIED: ICD-10-CM

## 2023-12-06 PROCEDURE — 99214 OFFICE O/P EST MOD 30 MIN: CPT | Mod: 25

## 2023-12-06 PROCEDURE — 93000 ELECTROCARDIOGRAM COMPLETE: CPT

## 2023-12-06 RX ORDER — OLMESARTAN MEDOXOMIL AND HYDROCHLOROTHIAZIDE 40; 12.5 MG/1; MG/1
40-12.5 TABLET ORAL
Qty: 90 | Refills: 0 | Status: DISCONTINUED | COMMUNITY
Start: 2019-02-08 | End: 2023-12-06

## 2023-12-07 PROBLEM — R79.9 ABNORMAL BLOOD CHEMISTRY: Status: ACTIVE | Noted: 2023-12-07

## 2023-12-07 PROBLEM — R82.90 ABNORMAL URINALYSIS: Status: ACTIVE | Noted: 2023-12-07

## 2023-12-07 LAB
ALBUMIN SERPL ELPH-MCNC: 4.6 G/DL
ALP BLD-CCNC: 76 U/L
ALT SERPL-CCNC: 16 U/L
ANION GAP SERPL CALC-SCNC: 18 MMOL/L
APPEARANCE: CLEAR
AST SERPL-CCNC: 19 U/L
BACTERIA: NEGATIVE /HPF
BASOPHILS # BLD AUTO: 0.03 K/UL
BASOPHILS NFR BLD AUTO: 0.5 %
BILIRUB SERPL-MCNC: 0.4 MG/DL
BILIRUBIN URINE: NEGATIVE
BLOOD URINE: NEGATIVE
BUN SERPL-MCNC: 15 MG/DL
CALCIUM SERPL-MCNC: 10.1 MG/DL
CAST: 0 /LPF
CHLORIDE SERPL-SCNC: 99 MMOL/L
CHOLEST SERPL-MCNC: 200 MG/DL
CO2 SERPL-SCNC: 24 MMOL/L
COLOR: YELLOW
CREAT SERPL-MCNC: 0.95 MG/DL
EGFR: 69 ML/MIN/1.73M2
EOSINOPHIL # BLD AUTO: 0.08 K/UL
EOSINOPHIL NFR BLD AUTO: 1.4 %
EPITHELIAL CELLS: 2 /HPF
ESTIMATED AVERAGE GLUCOSE: 103 MG/DL
FERRITIN SERPL-MCNC: 301 NG/ML
FOLATE SERPL-MCNC: >20 NG/ML
GLUCOSE QUALITATIVE U: NEGATIVE MG/DL
GLUCOSE SERPL-MCNC: 88 MG/DL
HBA1C MFR BLD HPLC: 5.2 %
HCT VFR BLD CALC: 40 %
HDLC SERPL-MCNC: 78 MG/DL
HGB BLD-MCNC: 12.5 G/DL
IMM GRANULOCYTES NFR BLD AUTO: 0.2 %
IRON SATN MFR SERPL: 31 %
IRON SERPL-MCNC: 109 UG/DL
KETONES URINE: NEGATIVE MG/DL
LDLC SERPL CALC-MCNC: 107 MG/DL
LEUKOCYTE ESTERASE URINE: ABNORMAL
LYMPHOCYTES # BLD AUTO: 2.48 K/UL
LYMPHOCYTES NFR BLD AUTO: 43.2 %
MAN DIFF?: NORMAL
MCHC RBC-ENTMCNC: 29.7 PG
MCHC RBC-ENTMCNC: 31.3 GM/DL
MCV RBC AUTO: 95 FL
MICROSCOPIC-UA: NORMAL
MONOCYTES # BLD AUTO: 0.37 K/UL
MONOCYTES NFR BLD AUTO: 6.4 %
NEUTROPHILS # BLD AUTO: 2.77 K/UL
NEUTROPHILS NFR BLD AUTO: 48.3 %
NITRITE URINE: NEGATIVE
NONHDLC SERPL-MCNC: 122 MG/DL
PH URINE: 6
PLATELET # BLD AUTO: 337 K/UL
POTASSIUM SERPL-SCNC: 3.6 MMOL/L
PROT SERPL-MCNC: 8.2 G/DL
PROTEIN URINE: NEGATIVE MG/DL
RBC # BLD: 4.21 M/UL
RBC # FLD: 12.8 %
RED BLOOD CELLS URINE: 0 /HPF
SODIUM SERPL-SCNC: 140 MMOL/L
SPECIFIC GRAVITY URINE: 1.01
T4 FREE SERPL-MCNC: 1.1 NG/DL
TIBC SERPL-MCNC: 348 UG/DL
TRANSFERRIN SERPL-MCNC: 241 MG/DL
TRIGL SERPL-MCNC: 87 MG/DL
TSH SERPL-ACNC: 0.83 UIU/ML
UIBC SERPL-MCNC: 240 UG/DL
UROBILINOGEN URINE: 0.2 MG/DL
VIT B12 SERPL-MCNC: 586 PG/ML
WBC # FLD AUTO: 5.74 K/UL
WHITE BLOOD CELLS URINE: 2 /HPF

## 2023-12-09 ENCOUNTER — RX RENEWAL (OUTPATIENT)
Age: 58
End: 2023-12-09

## 2023-12-10 ENCOUNTER — RX RENEWAL (OUTPATIENT)
Age: 58
End: 2023-12-10

## 2023-12-10 RX ORDER — GABAPENTIN 100 MG/1
100 CAPSULE ORAL
Qty: 90 | Refills: 0 | Status: ACTIVE | COMMUNITY
Start: 2023-09-14 | End: 1900-01-01

## 2023-12-11 ENCOUNTER — APPOINTMENT (OUTPATIENT)
Dept: ORTHOPEDIC SURGERY | Facility: CLINIC | Age: 58
End: 2023-12-11
Payer: COMMERCIAL

## 2023-12-11 LAB — BACTERIA UR CULT: NORMAL

## 2023-12-11 PROCEDURE — 99213 OFFICE O/P EST LOW 20 MIN: CPT

## 2023-12-20 ENCOUNTER — APPOINTMENT (OUTPATIENT)
Dept: CARDIOLOGY | Facility: CLINIC | Age: 58
End: 2023-12-20
Payer: COMMERCIAL

## 2023-12-20 VITALS
RESPIRATION RATE: 16 BRPM | WEIGHT: 237.25 LBS | HEIGHT: 69 IN | HEART RATE: 69 BPM | OXYGEN SATURATION: 99 % | BODY MASS INDEX: 35.14 KG/M2 | DIASTOLIC BLOOD PRESSURE: 90 MMHG | TEMPERATURE: 98.7 F | SYSTOLIC BLOOD PRESSURE: 144 MMHG

## 2023-12-20 DIAGNOSIS — E66.01 MORBID (SEVERE) OBESITY DUE TO EXCESS CALORIES: ICD-10-CM

## 2023-12-20 PROCEDURE — 93000 ELECTROCARDIOGRAM COMPLETE: CPT

## 2023-12-20 PROCEDURE — 99214 OFFICE O/P EST MOD 30 MIN: CPT | Mod: 25

## 2023-12-20 RX ORDER — HYDROCHLOROTHIAZIDE 12.5 MG/1
12.5 TABLET ORAL
Qty: 30 | Refills: 2 | Status: ACTIVE | COMMUNITY
Start: 2023-12-20 | End: 1900-01-01

## 2023-12-20 NOTE — HISTORY OF PRESENT ILLNESS
[FreeTextEntry1] : This is a 58 year y/o female with a PMHx of HTN, Asthma, Anemia, LEONARD, and Chronic Back pain presents today for f/u on BP. Pt was last here 12/6/23 with complaints of dizziness, her BP meds were lowered from Olmesartan/HCTZ 40/12.5 to Olmesartan 20mg as her BP was soft. Pt reports dizziness has improved. She does report a headache today.  Patient denies chest pain, dyspnea, palpitations, dizziness, syncope, changes in bowel/bladder habits or appetite.

## 2023-12-25 ENCOUNTER — RX RENEWAL (OUTPATIENT)
Age: 58
End: 2023-12-25

## 2023-12-27 ENCOUNTER — EMERGENCY (EMERGENCY)
Facility: HOSPITAL | Age: 58
LOS: 1 days | Discharge: HOME | End: 2023-12-27
Attending: EMERGENCY MEDICINE | Admitting: EMERGENCY MEDICINE
Payer: COMMERCIAL

## 2023-12-27 VITALS
SYSTOLIC BLOOD PRESSURE: 127 MMHG | OXYGEN SATURATION: 97 % | DIASTOLIC BLOOD PRESSURE: 71 MMHG | RESPIRATION RATE: 18 BRPM | TEMPERATURE: 98 F | HEART RATE: 52 BPM

## 2023-12-27 VITALS
SYSTOLIC BLOOD PRESSURE: 158 MMHG | HEART RATE: 102 BPM | RESPIRATION RATE: 18 BRPM | DIASTOLIC BLOOD PRESSURE: 81 MMHG | TEMPERATURE: 98 F | OXYGEN SATURATION: 99 %

## 2023-12-27 LAB
ALBUMIN SERPL ELPH-MCNC: 4.3 G/DL — SIGNIFICANT CHANGE UP (ref 3.3–5)
ALBUMIN SERPL ELPH-MCNC: 4.3 G/DL — SIGNIFICANT CHANGE UP (ref 3.3–5)
ALP SERPL-CCNC: 78 U/L — SIGNIFICANT CHANGE UP (ref 40–120)
ALP SERPL-CCNC: 78 U/L — SIGNIFICANT CHANGE UP (ref 40–120)
ALT FLD-CCNC: 14 U/L — SIGNIFICANT CHANGE UP (ref 4–33)
ALT FLD-CCNC: 14 U/L — SIGNIFICANT CHANGE UP (ref 4–33)
ANION GAP SERPL CALC-SCNC: 14 MMOL/L — SIGNIFICANT CHANGE UP (ref 7–14)
ANION GAP SERPL CALC-SCNC: 14 MMOL/L — SIGNIFICANT CHANGE UP (ref 7–14)
APPEARANCE UR: CLEAR — SIGNIFICANT CHANGE UP
APPEARANCE UR: CLEAR — SIGNIFICANT CHANGE UP
AST SERPL-CCNC: 17 U/L — SIGNIFICANT CHANGE UP (ref 4–32)
AST SERPL-CCNC: 17 U/L — SIGNIFICANT CHANGE UP (ref 4–32)
BACTERIA # UR AUTO: NEGATIVE /HPF — SIGNIFICANT CHANGE UP
BACTERIA # UR AUTO: NEGATIVE /HPF — SIGNIFICANT CHANGE UP
BASOPHILS # BLD AUTO: 0.02 K/UL — SIGNIFICANT CHANGE UP (ref 0–0.2)
BASOPHILS # BLD AUTO: 0.02 K/UL — SIGNIFICANT CHANGE UP (ref 0–0.2)
BASOPHILS NFR BLD AUTO: 0.3 % — SIGNIFICANT CHANGE UP (ref 0–2)
BASOPHILS NFR BLD AUTO: 0.3 % — SIGNIFICANT CHANGE UP (ref 0–2)
BILIRUB SERPL-MCNC: 0.4 MG/DL — SIGNIFICANT CHANGE UP (ref 0.2–1.2)
BILIRUB SERPL-MCNC: 0.4 MG/DL — SIGNIFICANT CHANGE UP (ref 0.2–1.2)
BILIRUB UR-MCNC: NEGATIVE — SIGNIFICANT CHANGE UP
BILIRUB UR-MCNC: NEGATIVE — SIGNIFICANT CHANGE UP
BUN SERPL-MCNC: 13 MG/DL — SIGNIFICANT CHANGE UP (ref 7–23)
BUN SERPL-MCNC: 13 MG/DL — SIGNIFICANT CHANGE UP (ref 7–23)
CALCIUM SERPL-MCNC: 9.9 MG/DL — SIGNIFICANT CHANGE UP (ref 8.4–10.5)
CALCIUM SERPL-MCNC: 9.9 MG/DL — SIGNIFICANT CHANGE UP (ref 8.4–10.5)
CAST: 12 /LPF — HIGH (ref 0–4)
CAST: 12 /LPF — HIGH (ref 0–4)
CHLORIDE SERPL-SCNC: 100 MMOL/L — SIGNIFICANT CHANGE UP (ref 98–107)
CHLORIDE SERPL-SCNC: 100 MMOL/L — SIGNIFICANT CHANGE UP (ref 98–107)
CO2 SERPL-SCNC: 25 MMOL/L — SIGNIFICANT CHANGE UP (ref 22–31)
CO2 SERPL-SCNC: 25 MMOL/L — SIGNIFICANT CHANGE UP (ref 22–31)
COLOR SPEC: YELLOW — SIGNIFICANT CHANGE UP
COLOR SPEC: YELLOW — SIGNIFICANT CHANGE UP
CREAT SERPL-MCNC: 1.07 MG/DL — SIGNIFICANT CHANGE UP (ref 0.5–1.3)
CREAT SERPL-MCNC: 1.07 MG/DL — SIGNIFICANT CHANGE UP (ref 0.5–1.3)
DIFF PNL FLD: NEGATIVE — SIGNIFICANT CHANGE UP
DIFF PNL FLD: NEGATIVE — SIGNIFICANT CHANGE UP
EGFR: 60 ML/MIN/1.73M2 — SIGNIFICANT CHANGE UP
EGFR: 60 ML/MIN/1.73M2 — SIGNIFICANT CHANGE UP
EOSINOPHIL # BLD AUTO: 0.05 K/UL — SIGNIFICANT CHANGE UP (ref 0–0.5)
EOSINOPHIL # BLD AUTO: 0.05 K/UL — SIGNIFICANT CHANGE UP (ref 0–0.5)
EOSINOPHIL NFR BLD AUTO: 0.7 % — SIGNIFICANT CHANGE UP (ref 0–6)
EOSINOPHIL NFR BLD AUTO: 0.7 % — SIGNIFICANT CHANGE UP (ref 0–6)
GLUCOSE SERPL-MCNC: 78 MG/DL — SIGNIFICANT CHANGE UP (ref 70–99)
GLUCOSE SERPL-MCNC: 78 MG/DL — SIGNIFICANT CHANGE UP (ref 70–99)
GLUCOSE UR QL: NEGATIVE MG/DL — SIGNIFICANT CHANGE UP
GLUCOSE UR QL: NEGATIVE MG/DL — SIGNIFICANT CHANGE UP
HCT VFR BLD CALC: 37.5 % — SIGNIFICANT CHANGE UP (ref 34.5–45)
HCT VFR BLD CALC: 37.5 % — SIGNIFICANT CHANGE UP (ref 34.5–45)
HGB BLD-MCNC: 11.9 G/DL — SIGNIFICANT CHANGE UP (ref 11.5–15.5)
HGB BLD-MCNC: 11.9 G/DL — SIGNIFICANT CHANGE UP (ref 11.5–15.5)
HYALINE CASTS # UR AUTO: PRESENT
HYALINE CASTS # UR AUTO: PRESENT
IANC: 4.56 K/UL — SIGNIFICANT CHANGE UP (ref 1.8–7.4)
IANC: 4.56 K/UL — SIGNIFICANT CHANGE UP (ref 1.8–7.4)
IMM GRANULOCYTES NFR BLD AUTO: 0.3 % — SIGNIFICANT CHANGE UP (ref 0–0.9)
IMM GRANULOCYTES NFR BLD AUTO: 0.3 % — SIGNIFICANT CHANGE UP (ref 0–0.9)
KETONES UR-MCNC: NEGATIVE MG/DL — SIGNIFICANT CHANGE UP
KETONES UR-MCNC: NEGATIVE MG/DL — SIGNIFICANT CHANGE UP
LEUKOCYTE ESTERASE UR-ACNC: ABNORMAL
LEUKOCYTE ESTERASE UR-ACNC: ABNORMAL
LYMPHOCYTES # BLD AUTO: 1.78 K/UL — SIGNIFICANT CHANGE UP (ref 1–3.3)
LYMPHOCYTES # BLD AUTO: 1.78 K/UL — SIGNIFICANT CHANGE UP (ref 1–3.3)
LYMPHOCYTES # BLD AUTO: 25.2 % — SIGNIFICANT CHANGE UP (ref 13–44)
LYMPHOCYTES # BLD AUTO: 25.2 % — SIGNIFICANT CHANGE UP (ref 13–44)
MCHC RBC-ENTMCNC: 29 PG — SIGNIFICANT CHANGE UP (ref 27–34)
MCHC RBC-ENTMCNC: 29 PG — SIGNIFICANT CHANGE UP (ref 27–34)
MCHC RBC-ENTMCNC: 31.7 GM/DL — LOW (ref 32–36)
MCHC RBC-ENTMCNC: 31.7 GM/DL — LOW (ref 32–36)
MCV RBC AUTO: 91.2 FL — SIGNIFICANT CHANGE UP (ref 80–100)
MCV RBC AUTO: 91.2 FL — SIGNIFICANT CHANGE UP (ref 80–100)
MONOCYTES # BLD AUTO: 0.64 K/UL — SIGNIFICANT CHANGE UP (ref 0–0.9)
MONOCYTES # BLD AUTO: 0.64 K/UL — SIGNIFICANT CHANGE UP (ref 0–0.9)
MONOCYTES NFR BLD AUTO: 9.1 % — SIGNIFICANT CHANGE UP (ref 2–14)
MONOCYTES NFR BLD AUTO: 9.1 % — SIGNIFICANT CHANGE UP (ref 2–14)
NEUTROPHILS # BLD AUTO: 4.56 K/UL — SIGNIFICANT CHANGE UP (ref 1.8–7.4)
NEUTROPHILS # BLD AUTO: 4.56 K/UL — SIGNIFICANT CHANGE UP (ref 1.8–7.4)
NEUTROPHILS NFR BLD AUTO: 64.4 % — SIGNIFICANT CHANGE UP (ref 43–77)
NEUTROPHILS NFR BLD AUTO: 64.4 % — SIGNIFICANT CHANGE UP (ref 43–77)
NITRITE UR-MCNC: NEGATIVE — SIGNIFICANT CHANGE UP
NITRITE UR-MCNC: NEGATIVE — SIGNIFICANT CHANGE UP
NRBC # BLD: 0 /100 WBCS — SIGNIFICANT CHANGE UP (ref 0–0)
NRBC # BLD: 0 /100 WBCS — SIGNIFICANT CHANGE UP (ref 0–0)
NRBC # FLD: 0 K/UL — SIGNIFICANT CHANGE UP (ref 0–0)
NRBC # FLD: 0 K/UL — SIGNIFICANT CHANGE UP (ref 0–0)
PH UR: 6.5 — SIGNIFICANT CHANGE UP (ref 5–8)
PH UR: 6.5 — SIGNIFICANT CHANGE UP (ref 5–8)
PLATELET # BLD AUTO: 289 K/UL — SIGNIFICANT CHANGE UP (ref 150–400)
PLATELET # BLD AUTO: 289 K/UL — SIGNIFICANT CHANGE UP (ref 150–400)
POTASSIUM SERPL-MCNC: 4.1 MMOL/L — SIGNIFICANT CHANGE UP (ref 3.5–5.3)
POTASSIUM SERPL-MCNC: 4.1 MMOL/L — SIGNIFICANT CHANGE UP (ref 3.5–5.3)
POTASSIUM SERPL-SCNC: 4.1 MMOL/L — SIGNIFICANT CHANGE UP (ref 3.5–5.3)
POTASSIUM SERPL-SCNC: 4.1 MMOL/L — SIGNIFICANT CHANGE UP (ref 3.5–5.3)
PROT SERPL-MCNC: 8.2 G/DL — SIGNIFICANT CHANGE UP (ref 6–8.3)
PROT SERPL-MCNC: 8.2 G/DL — SIGNIFICANT CHANGE UP (ref 6–8.3)
PROT UR-MCNC: NEGATIVE MG/DL — SIGNIFICANT CHANGE UP
PROT UR-MCNC: NEGATIVE MG/DL — SIGNIFICANT CHANGE UP
RBC # BLD: 4.11 M/UL — SIGNIFICANT CHANGE UP (ref 3.8–5.2)
RBC # BLD: 4.11 M/UL — SIGNIFICANT CHANGE UP (ref 3.8–5.2)
RBC # FLD: 12.7 % — SIGNIFICANT CHANGE UP (ref 10.3–14.5)
RBC # FLD: 12.7 % — SIGNIFICANT CHANGE UP (ref 10.3–14.5)
RBC CASTS # UR COMP ASSIST: 1 /HPF — SIGNIFICANT CHANGE UP (ref 0–4)
RBC CASTS # UR COMP ASSIST: 1 /HPF — SIGNIFICANT CHANGE UP (ref 0–4)
REVIEW: SIGNIFICANT CHANGE UP
REVIEW: SIGNIFICANT CHANGE UP
SODIUM SERPL-SCNC: 139 MMOL/L — SIGNIFICANT CHANGE UP (ref 135–145)
SODIUM SERPL-SCNC: 139 MMOL/L — SIGNIFICANT CHANGE UP (ref 135–145)
SP GR SPEC: 1.02 — SIGNIFICANT CHANGE UP (ref 1–1.03)
SP GR SPEC: 1.02 — SIGNIFICANT CHANGE UP (ref 1–1.03)
SQUAMOUS # UR AUTO: 5 /HPF — SIGNIFICANT CHANGE UP (ref 0–5)
SQUAMOUS # UR AUTO: 5 /HPF — SIGNIFICANT CHANGE UP (ref 0–5)
UROBILINOGEN FLD QL: 1 MG/DL — SIGNIFICANT CHANGE UP (ref 0.2–1)
UROBILINOGEN FLD QL: 1 MG/DL — SIGNIFICANT CHANGE UP (ref 0.2–1)
WBC # BLD: 7.07 K/UL — SIGNIFICANT CHANGE UP (ref 3.8–10.5)
WBC # BLD: 7.07 K/UL — SIGNIFICANT CHANGE UP (ref 3.8–10.5)
WBC # FLD AUTO: 7.07 K/UL — SIGNIFICANT CHANGE UP (ref 3.8–10.5)
WBC # FLD AUTO: 7.07 K/UL — SIGNIFICANT CHANGE UP (ref 3.8–10.5)
WBC UR QL: 4 /HPF — SIGNIFICANT CHANGE UP (ref 0–5)
WBC UR QL: 4 /HPF — SIGNIFICANT CHANGE UP (ref 0–5)

## 2023-12-27 PROCEDURE — G1004: CPT

## 2023-12-27 PROCEDURE — 76830 TRANSVAGINAL US NON-OB: CPT | Mod: 26

## 2023-12-27 PROCEDURE — 74177 CT ABD & PELVIS W/CONTRAST: CPT | Mod: 26,ME

## 2023-12-27 PROCEDURE — 99285 EMERGENCY DEPT VISIT HI MDM: CPT

## 2023-12-27 RX ORDER — CEPHALEXIN 500 MG
500 CAPSULE ORAL ONCE
Refills: 0 | Status: COMPLETED | OUTPATIENT
Start: 2023-12-27 | End: 2023-12-27

## 2023-12-27 RX ORDER — SODIUM CHLORIDE 9 MG/ML
1000 INJECTION INTRAMUSCULAR; INTRAVENOUS; SUBCUTANEOUS ONCE
Refills: 0 | Status: COMPLETED | OUTPATIENT
Start: 2023-12-27 | End: 2023-12-27

## 2023-12-27 RX ORDER — ACETAMINOPHEN 500 MG
650 TABLET ORAL ONCE
Refills: 0 | Status: COMPLETED | OUTPATIENT
Start: 2023-12-27 | End: 2023-12-27

## 2023-12-27 RX ORDER — CEPHALEXIN 500 MG
1 CAPSULE ORAL
Qty: 20 | Refills: 0
Start: 2023-12-27 | End: 2023-12-31

## 2023-12-27 RX ADMIN — SODIUM CHLORIDE 1000 MILLILITER(S): 9 INJECTION INTRAMUSCULAR; INTRAVENOUS; SUBCUTANEOUS at 10:51

## 2023-12-27 RX ADMIN — Medication 500 MILLIGRAM(S): at 14:48

## 2023-12-27 RX ADMIN — Medication 650 MILLIGRAM(S): at 10:51

## 2023-12-27 NOTE — ED PROVIDER NOTE - OBJECTIVE STATEMENT
59 y/o female h/o asthma hyst with c/o rlq pain x2 days.  Denies f/c dysuria hematuria flank pain n/v/d fever.  Pain is constant, worse with movement.  Denies overlying skin change.  Passing flatus and having bm.

## 2023-12-27 NOTE — ED ADULT NURSE NOTE - OBJECTIVE STATEMENT
Received pt to intake 13, A+Ox4, ambulatory. C/O RLQ pain x 2 days. ABD is soft, tender to the RLQ, non distended. Pt denies any chest pain, SOB, headache, dizziness, N+V, diarrhea, fever, chills.  20G to RAC, Labs sent, Medicated as per Provider orders, will continue to monitor.

## 2023-12-27 NOTE — ED PROVIDER NOTE - CLINICAL SUMMARY MEDICAL DECISION MAKING FREE TEXT BOX
59 y/o female with rlq pain.  Evaluate for appy divertic colitis uti ov cyst.  Obtain cbc cmp ua ucx hcg tvus ct a/p give ivf bolus pain med reassess. 57 y/o female with rlq pain.  Evaluate for appy divertic colitis uti ov cyst.  Obtain cbc cmp ua ucx hcg tvus ct a/p give ivf bolus pain med reassess.

## 2023-12-27 NOTE — ED ADULT TRIAGE NOTE - CHIEF COMPLAINT QUOTE
Pt arrives ambulatory to triage c/o pelvic/groin pain x2 days. States that pain is causing difficulty ambulating. Denies dysuria or vaginal bleeding. PMHx: LEONARD, asthma, hysterectomy (2013), ?kidney failure.

## 2023-12-28 ENCOUNTER — APPOINTMENT (OUTPATIENT)
Age: 58
End: 2023-12-28
Payer: COMMERCIAL

## 2023-12-28 VITALS
WEIGHT: 237 LBS | BODY MASS INDEX: 35.1 KG/M2 | HEART RATE: 87 BPM | HEIGHT: 69 IN | OXYGEN SATURATION: 98 % | TEMPERATURE: 97.8 F

## 2023-12-28 VITALS — DIASTOLIC BLOOD PRESSURE: 76 MMHG | SYSTOLIC BLOOD PRESSURE: 114 MMHG

## 2023-12-28 DIAGNOSIS — R10.2 PELVIC AND PERINEAL PAIN: ICD-10-CM

## 2023-12-28 DIAGNOSIS — M54.50 LOW BACK PAIN, UNSPECIFIED: ICD-10-CM

## 2023-12-28 LAB
CULTURE RESULTS: SIGNIFICANT CHANGE UP
CULTURE RESULTS: SIGNIFICANT CHANGE UP
SPECIMEN SOURCE: SIGNIFICANT CHANGE UP
SPECIMEN SOURCE: SIGNIFICANT CHANGE UP

## 2023-12-28 PROCEDURE — 99204 OFFICE O/P NEW MOD 45 MIN: CPT

## 2023-12-28 RX ORDER — NAPROXEN 500 MG/1
500 TABLET ORAL
Qty: 30 | Refills: 0 | Status: ACTIVE | COMMUNITY
Start: 2023-12-28 | End: 1900-01-01

## 2024-01-03 NOTE — ASSESSMENT
[FreeTextEntry1] : er labs and imaging reviewed ua not c/v cystitis stranding nonspecific pain likely msk  will give courseof nsaids

## 2024-01-03 NOTE — HISTORY OF PRESENT ILLNESS
[FreeTextEntry1] : - Chief Complaint: The patient is a 58y Female complaining of pelvic pain. - HPI Objective Statement: 59 y/o female h/o asthma hyst with c/o rlq pain x2 days.  Denies f/c dysuria hematuria flank pain n/v/d fever.  Pain is constant, worse with movement.  Denies overlying skin change.  Passing flatus and having bm.  in ed nl ua  ct some stranding around bladder  nl pelvic sono

## 2024-01-08 ENCOUNTER — RX RENEWAL (OUTPATIENT)
Age: 59
End: 2024-01-08

## 2024-01-15 NOTE — PHYSICAL EXAM
[General Appearance - Alert] : alert [General Appearance - In No Acute Distress] : in no acute distress [Neck Appearance] : the appearance of the neck was normal [Neck Cervical Mass (___cm)] : no neck mass was observed [Jugular Venous Distention Increased] : there was no jugular-venous distention [Thyroid Diffuse Enlargement] : the thyroid was not enlarged [Thyroid Nodule] : there were no palpable thyroid nodules [Auscultation Breath Sounds / Voice Sounds] : lungs were clear to auscultation bilaterally [Heart Rate And Rhythm] : heart rate was normal and rhythm regular [Heart Sounds] : normal S1 and S2 [Heart Sounds Gallop] : no gallops [Murmurs] : no murmurs [Heart Sounds Pericardial Friction Rub] : no pericardial rub [Bowel Sounds] : normal bowel sounds [Abdomen Soft] : soft [Abdomen Tenderness] : non-tender [] : no hepato-splenomegaly [Abdomen Mass (___ Cm)] : no abdominal mass palpated [Cervical Lymph Nodes Enlarged Posterior Bilaterally] : posterior cervical [Cervical Lymph Nodes Enlarged Anterior Bilaterally] : anterior cervical [Supraclavicular Lymph Nodes Enlarged Bilaterally] : supraclavicular [No CVA Tenderness] : no ~M costovertebral angle tenderness [No Spinal Tenderness] : no spinal tenderness All other review of systems negative, except as noted in HPI

## 2024-01-22 ENCOUNTER — APPOINTMENT (OUTPATIENT)
Dept: ORTHOPEDIC SURGERY | Facility: CLINIC | Age: 59
End: 2024-01-22
Payer: COMMERCIAL

## 2024-01-22 DIAGNOSIS — M54.16 RADICULOPATHY, LUMBAR REGION: ICD-10-CM

## 2024-01-22 DIAGNOSIS — M50.90 CERVICAL DISC DISORDER, UNSPECIFIED, UNSPECIFIED CERVICAL REGION: ICD-10-CM

## 2024-01-22 PROCEDURE — 99213 OFFICE O/P EST LOW 20 MIN: CPT

## 2024-01-22 NOTE — PHYSICAL EXAM
[de-identified] : Lumbar Physical Exam  Gait - Normal  Station - Normal  Sagittal balance - Normal  Compensatory mechanism? - None  Heel walk - Normal  Toe walk - Normal  Reflexes Patellar - normal Gastroc - normal Clonus - No  Hip Exam - Normal  Straight leg raise - none  Pulses - 2+ dp/pt  Range of motion - normal  Sensation  Sensation intact to light touch in L1, L2, L3, L4, L5 and S1 dermatomes bilaterally  Motor 	IP	Quad	HS	TA	Gastroc	EHL Right 5/5	5/5	5/5	5/5	5/5	5/5 Left	5/5	5/5	5/5	5/5	5/5	5/5 [de-identified] : Previous imaging MRI lumbar spine  Diffuse disc degeneration No significant stenosis  Sacrum radiographs No fracture noted Overall alignment stable.

## 2024-01-22 NOTE — HISTORY OF PRESENT ILLNESS
[de-identified] : 59 yo female following up for her neck and low back pain. She had an EMG back in March 2023 that noted bilateral carpal tunnel, R>L. She denies any bowel or bladder dysfunction or saddle anesthesia. She has not started another round of physical therapy and is requesting a referral. She notes feeling stiffness in her neck and back.   12/11/2023 59 yo female following up for her low back pain and right sided radiculopathy down her buttocks into proximal posterior thigh. She recently came back from a trip to [place] where she did an extensive amount of walking. She states she did have pain but was able to walk. She is using lidocaine patches and Tylenol. Denies any bowel or bladder dysfunction or saddle anesthesia. She recently had an MRI of her lumbar spine and is here to review results and discuss next steps in care. She had been going to physical therapy but wants to change locations as she did not find it very helpful.   10/12/2023 This is a 58-year-old female who is here for evaluation and treatment of her low back pain.  Apparently 3 weeks ago she had a fairly significant fall buttocks and back.  Since that time she has been dealing with some substantial low back and buttock pain.  It is interfering with her quality of life and ability to perform her actives of daily living.  She is here today to discuss neck steps in her care.

## 2024-01-22 NOTE — ASSESSMENT
[FreeTextEntry1] : I had a lengthy discussion with the patient in regards to treatment plan and diagnosis. There are no red flag findings on imaging nor are there any red flag findings on clinical exam. Therefore we will proceed with a course of conservative treatment. This would include physical therapy/home exercise program, Tylenol, NSAIDs as medically indicated. The patient will follow up with me in approximately 4 to 6 weeks. I encouraged the patient to follow-up sooner if there are any new or worsening symptoms.

## 2024-01-24 ENCOUNTER — APPOINTMENT (OUTPATIENT)
Dept: UROLOGY | Facility: CLINIC | Age: 59
End: 2024-01-24
Payer: COMMERCIAL

## 2024-01-24 VITALS
HEART RATE: 76 BPM | DIASTOLIC BLOOD PRESSURE: 81 MMHG | TEMPERATURE: 97.9 F | BODY MASS INDEX: 34.07 KG/M2 | SYSTOLIC BLOOD PRESSURE: 120 MMHG | RESPIRATION RATE: 17 BRPM | WEIGHT: 230 LBS | HEIGHT: 69 IN

## 2024-01-24 DIAGNOSIS — R39.11 HESITANCY OF MICTURITION: ICD-10-CM

## 2024-01-24 DIAGNOSIS — R39.198 OTHER DIFFICULTIES WITH MICTURITION: ICD-10-CM

## 2024-01-24 PROCEDURE — 99214 OFFICE O/P EST MOD 30 MIN: CPT

## 2024-01-24 NOTE — ASSESSMENT
[FreeTextEntry1] : Suspect back pain, constipation, hx of anorectal spas/burning and these intermittent urinary sx all interrelated and a function (at least in part) of pelvic floor. Pt currently going to PT for back.  --Review findings wiht PT --Bowel regimen --Avoid strain. No straining to urinate --Pelvic floor relaxation exercises (info given) --Warm baths daily

## 2024-01-24 NOTE — PHYSICAL EXAM
[Normal Appearance] : normal appearance [Well Groomed] : well groomed [General Appearance - In No Acute Distress] : no acute distress [Edema] : no peripheral edema [Respiration, Rhythm And Depth] : normal respiratory rhythm and effort [Exaggerated Use Of Accessory Muscles For Inspiration] : no accessory muscle use [Abdomen Soft] : soft [Abdomen Tenderness] : non-tender [Costovertebral Angle Tenderness] : no ~M costovertebral angle tenderness [Urinary Bladder Findings] : the bladder was normal on palpation [Urethral Meatus] : the meatus of the urethra showed no abnormalities [External Female Genitalia] : normal external genitalia [Vagina] : normal vaginal exam [Normal Station and Gait] : the gait and station were normal for the patient's age [] : no rash [Oriented To Time, Place, And Person] : oriented to person, place, and time [No Focal Deficits] : no focal deficits [Affect] : the affect was normal [Mood] : the mood was normal [No Palpable Adenopathy] : no palpable adenopathy [de-identified] : mild urethral prolapse, rectocele, no pelvic floor tenderness

## 2024-01-24 NOTE — HISTORY OF PRESENT ILLNESS
[FreeTextEntry1] : 59yo female with cc of slow urinary stream and hesitancy. Pt reports intermittent bother with these sx. She reports that she will feel like she needs to void. She will have low voided volumes with drips. Sx are not consistent. Pt with hx of constipation. Now on linzess. Has BMs 2x per day. Also has hx of rectal burning. Was placed on anusol in the past (2020) and had resolution. Has anorectal spasm listed as a problem. Pt also with chronic low back pain. No sciatica. Is on tizanadine for a tingling pain sensation in her L foot. Has been on gabapentin in the past as well but not taking. Is currently changing to new PT for the back pain. No pathology on imaging. ENdorses dyspareunia but attributes it to dryness.   Had seen Dr Littlejohn a month ago for RLQ pain that she had gone to ER for. Had imaging that showed nonspecific bladder inflammation.

## 2024-01-25 ENCOUNTER — APPOINTMENT (OUTPATIENT)
Dept: PULMONOLOGY | Facility: CLINIC | Age: 59
End: 2024-01-25

## 2024-01-25 LAB
APPEARANCE: CLEAR
BILIRUBIN URINE: NEGATIVE
BLOOD URINE: NEGATIVE
COLOR: YELLOW
GLUCOSE QUALITATIVE U: NEGATIVE MG/DL
KETONES URINE: NEGATIVE MG/DL
LEUKOCYTE ESTERASE URINE: NEGATIVE
NITRITE URINE: NEGATIVE
PH URINE: 5.5
PROTEIN URINE: NEGATIVE MG/DL
SPECIFIC GRAVITY URINE: 1.02
UROBILINOGEN URINE: 0.2 MG/DL

## 2024-02-07 ENCOUNTER — APPOINTMENT (OUTPATIENT)
Dept: ORTHOPEDIC SURGERY | Facility: CLINIC | Age: 59
End: 2024-02-07
Payer: COMMERCIAL

## 2024-02-07 VITALS — WEIGHT: 230 LBS | BODY MASS INDEX: 34.07 KG/M2 | HEIGHT: 69 IN

## 2024-02-07 DIAGNOSIS — M18.11 UNILATERAL PRIMARY OSTEOARTHRITIS OF FIRST CARPOMETACARPAL JOINT, RIGHT HAND: ICD-10-CM

## 2024-02-07 DIAGNOSIS — G56.01 CARPAL TUNNEL SYNDROME, RIGHT UPPER LIMB: ICD-10-CM

## 2024-02-07 PROCEDURE — 20526 THER INJECTION CARP TUNNEL: CPT | Mod: RT

## 2024-02-07 PROCEDURE — 99214 OFFICE O/P EST MOD 30 MIN: CPT | Mod: 25

## 2024-02-07 PROCEDURE — 20600 DRAIN/INJ JOINT/BURSA W/O US: CPT | Mod: F5

## 2024-02-07 PROCEDURE — 99204 OFFICE O/P NEW MOD 45 MIN: CPT | Mod: 25

## 2024-02-07 RX ORDER — MELOXICAM 15 MG/1
15 TABLET ORAL DAILY
Qty: 30 | Refills: 6 | Status: ACTIVE | COMMUNITY
Start: 2024-02-07 | End: 1900-01-01

## 2024-02-27 ENCOUNTER — APPOINTMENT (OUTPATIENT)
Dept: GASTROENTEROLOGY | Facility: CLINIC | Age: 59
End: 2024-02-27
Payer: COMMERCIAL

## 2024-02-27 VITALS — DIASTOLIC BLOOD PRESSURE: 78 MMHG | SYSTOLIC BLOOD PRESSURE: 120 MMHG

## 2024-02-27 VITALS
BODY MASS INDEX: 34.66 KG/M2 | HEART RATE: 75 BPM | RESPIRATION RATE: 17 BRPM | WEIGHT: 234 LBS | TEMPERATURE: 97.2 F | HEIGHT: 69 IN | OXYGEN SATURATION: 99 %

## 2024-02-27 DIAGNOSIS — K62.9 DISEASE OF ANUS AND RECTUM, UNSPECIFIED: ICD-10-CM

## 2024-02-27 DIAGNOSIS — Z12.11 ENCOUNTER FOR SCREENING FOR MALIGNANT NEOPLASM OF COLON: ICD-10-CM

## 2024-02-27 PROCEDURE — 99214 OFFICE O/P EST MOD 30 MIN: CPT

## 2024-02-27 NOTE — ASSESSMENT
[FreeTextEntry1] : 58-year-old female that is post normal colonoscopy with Jovan Birch in 2020 with occasional bloating.  Celiac panel and H. pylori normal.  She did not go for abdominal ultrasound yet.  There is no weight loss anorexia.  She is exercising regularly and had seen an orthopedic surgeon for some low back pain.  RTO 2/27/24. BMs improved with linzess. Hx of colonoscopy 2020 was normal. No more gerd. Hx of MR. IMP: constipation obesity  PLAN: high fiber diet, incrase fluids increase fiber and increase linzess to 290 mg / metamucil asd discussed rto 1 year

## 2024-02-28 ENCOUNTER — APPOINTMENT (OUTPATIENT)
Dept: PULMONOLOGY | Facility: CLINIC | Age: 59
End: 2024-02-28
Payer: COMMERCIAL

## 2024-02-28 VITALS — DIASTOLIC BLOOD PRESSURE: 79 MMHG | SYSTOLIC BLOOD PRESSURE: 123 MMHG | OXYGEN SATURATION: 100 % | HEART RATE: 84 BPM

## 2024-02-28 DIAGNOSIS — R07.89 OTHER CHEST PAIN: ICD-10-CM

## 2024-02-28 PROCEDURE — 94060 EVALUATION OF WHEEZING: CPT

## 2024-02-28 PROCEDURE — 99214 OFFICE O/P EST MOD 30 MIN: CPT | Mod: 25

## 2024-02-28 PROCEDURE — 71046 X-RAY EXAM CHEST 2 VIEWS: CPT

## 2024-02-28 NOTE — HISTORY OF PRESENT ILLNESS
[Stable] : are stable [None] : ~He/She~ has no significant interval events [Difficulty Breathing During Exertion] : stable dyspnea on exertion [Feelings Of Weakness On Exertion] : stable exercise intolerance [Cough] : denies coughing [Wheezing] : denies wheezing [Regional Soft Tissue Swelling Both Lower Extremities] : denies lower extremity edema [Chest Pain Or Discomfort] : denies chest pain [Fever] : denies fever [Obstructive Sleep Apnea] : obstructive sleep apnea [Date: ___] : Date of most recent diagnostic polysomnogram: [unfilled] [AHI: ___ per hour] : Apnea-hypopnea index:  [unfilled] per hour [Wt Gain ___ Lbs] : no recent weight gain [Wt Loss ___ Lbs] : no recent weight loss [Oxygen] : the patient uses no supplemental oxygen [Nocturnal Oxygen] : The patient does not use nocturnal oxygen [FreeTextEntry1] : AUTO  CPAP 6-18cm H20

## 2024-02-28 NOTE — PROCEDURE
[FreeTextEntry1] : Spirometry February 28, 2024 Flow rates Ratio is 77 No bronchodilator response in FEV1  Chest x-ray PA lateral February 28, 2024 Cardiac size is normal Clear lung fields No parenchymal infiltrate pleural effusion or dominant pulmonary nodules Soft tissue bony structures unremarkable There is a tiny right lower lung zone faint nodule that is longstanding chronic dating back to x-rays of 2018 confirmed with    JAVY  11/27/23  Mild OAD asx  PFT 10/16/23 minimal OAD lung volumes nl  DLCO 92 % HGB 12.1  JAVY  9/1/23  minimal OAD  ratio 76  CPAP data compliance adherence through August 31, 2023 Usage 93% Hours average greater than 7 AutoSet CPAP 6 to 18 cm H2O AHI 1.3 Continue current management  JAVY 5/22/23  Flow rates nl  ratio 77 nl stable pulmonary physiology  Spirometry no bronchodilator February 27, 2023 Flow rates are normal Ratio 76 Interval improvement of flow rates compared to data of November 21, 2022  Chest x-ray PA lateral 2/27/23 Normal cardiac size Lung fields are clear Impression infiltrates pleural effusions or dominant pulmonary nodules There is a tiny right lower lobe sub-6 cm well-circumscribed nodule that has been stable dating back to at least March 2019 was consistent with a postinflammatory nodule/granuloma  PFT 11/2/122 Flow rates nl  Mild OAD Lung volumes nl  DLCO 93 % HGB 10.8  Javy 10/10/22 flow rates nl  range  stable  DEXA 10/10/22  Left femoral neck nl Total Left hip nl AP L1- L4 nl  normal study f/u 2 - 5 years  CPAP data through October 9, 2022 87% AutoSet CPAP 6 to 15 cm H2O Greater than 7 AHI 3.8  Javy 8/25/22 flow rates nl stable FEV1  CPAP data compliance 8/24/22 97 % hours >7  AUTO CPAP 6-18 cm H20 AHI 2.5 pod occ airleak  CPAP_ RESMED 6/23/22 pos  airleak  PFT 5/13/22 Flow rates nl Lung Volumes nl DLCO 109 % HGB 11.2  Sleep study February 25 February 27, 2022 Severe obstructive sleep apnea positional component AHI 33 Follow-up post treatment with overnight oximetry    PFT 2/28/22 Very mild OAD TLC 86 %  normal lung volumes ERV 25 % sec overweight  DLCO  88 % HGB 11.9  Chest x-ray PA lateral February 18, 2022 Normal cardiac size Left midlung zone intrapulmonary lymph node versus end on vascular Not exclude tiny faint subcentimeter density right lower lobe No parenchymal infiltrates No pneumothorax No pleural effusion Roxie mediastinum unremarkable Soft tissue bony structures unremarkable Comparison to January 29, 2021 chest x-ray no interval change Right lower lobe most consistent with a subcentimeter granuloma  PFT 11/12/21 Normal flow rates  lung volumes nl  DLCO 86 % pred WNL HGB 11.1  PFT 8/20/21 flow rates nl  mild OAD TLC 94 RES IS INC SP CONDUCTANCE DEC DLCO 88 % interval improvement of flow rates  PFT 5/12/21 mILD oad lUNG vOLUMES NL DLCO 80 % wnl hgb 11.9  PFT 2/22/21 Normal  flow rates  minimal OAD Normal  lung volumes borderline airtrapping  Normal DLCO  84 %  pred  HGB 11.7  Chest x-ray PA lateral January 29, 2021 Normal cardiac size Clear lungs No parenchymal infiltrates pleural effusions with dominant pulmonary nodules left well-circumscribed intrapulmonary lymph node Tiny nodularity noted right lower lung zone most consistent with granuloma dating back  2 years  PFT Nov 23 2020  Mild OAD normal lung volumes Normal DLCO  HGB 11.4  Interval improvement at Flow Rates   NIOX 69 PPD October 22, 2020 PFT October 22, 2020 Mild reduction flow rates with a mild obstructive ventilatory impairment No significant response to bronchodilator at the FEV1 measuring 7% Borderline response at the small airways of 15% Lung volumes demonstrate normal total lung capacity Air trapping with RV/TLC ratio 38% predicted and a decreased ERV likely secondary to patient increased abdominal girth Diffusion normal 100% predicted. Hemoglobin 11.4 Data compared to August 11, 2020 does demonstrate some mild reduction at the flow rates  DEXA 9/17/20 normal study  8/11/2020 NIOX 50 PFT  spirometry normal 30% response to bronchodilator at the FEV1 Normal lung volumes Diffusion normal 92% predicted. Positive bronchodilator response  Chest x-ray PA lateral March 9, 2020 Normal cardiac size Clear lung fields without parenchymal infiltrates pleural effusions dominant pulmonary nodules Tissue bony structures normal Roxie mediastinum normal Impression clear lungs   CPAP DATA data    3/14/22 usage 93 % Hours > 6 Auto CPAp 6 - 18 cm h20 AHI 1.9  PFIZER completed vaccine  Echocardiogram August 14, 2021 Mild concentric LVH Impaired LV relaxation with normal filling pressure Myxomatous mitral valve Mild to moderate mitral regurgitation Mild tricuspid regurgitation Normal global LV systolic function Stress test treadmill August 14, 2021 No reported ischemic changes No arrhythmia noted  Flu vaccination intramuscular October 16 2023

## 2024-02-28 NOTE — PHYSICAL EXAM
[General Appearance - Well Developed] : well developed [Normal Appearance] : normal appearance [Well Groomed] : well groomed [General Appearance - Well Nourished] : well nourished [General Appearance - In No Acute Distress] : no acute distress [No Deformities] : no deformities [Normal Conjunctiva] : the conjunctiva exhibited no abnormalities [Eyelids - No Xanthelasma] : the eyelids demonstrated no xanthelasmas [II] : II [Normal Oropharynx] : normal oropharynx [Neck Appearance] : the appearance of the neck was normal [Neck Cervical Mass (___cm)] : no neck mass was observed [Jugular Venous Distention Increased] : there was no jugular-venous distention [Thyroid Diffuse Enlargement] : the thyroid was not enlarged [Heart Rate And Rhythm] : heart rate and rhythm were normal [Murmurs] : no murmurs present [Heart Sounds] : normal S1 and S2 [Arterial Pulses Normal] : the arterial pulses were normal [Edema] : no peripheral edema present [Veins - Varicosity Changes] : no varicosital changes were noted in the lower extremities [Respiration, Rhythm And Depth] : normal respiratory rhythm and effort [Exaggerated Use Of Accessory Muscles For Inspiration] : no accessory muscle use [Chest Palpation] : palpation of the chest revealed no abnormalities [Auscultation Breath Sounds / Voice Sounds] : lungs were clear to auscultation bilaterally [Abdomen Soft] : soft [Lungs Percussion] : the lungs were normal to percussion [Abdomen Tenderness] : non-tender [Abdomen Mass (___ Cm)] : no abdominal mass palpated [Abnormal Walk] : normal gait [Gait - Sufficient For Exercise Testing] : the gait was sufficient for exercise testing [Nail Clubbing] : no clubbing of the fingernails [Cyanosis, Localized] : no localized cyanosis [Petechial Hemorrhages (___cm)] : no petechial hemorrhages [Skin Color & Pigmentation] : normal skin color and pigmentation [] : no rash [Skin Lesions] : no skin lesions [No Venous Stasis] : no venous stasis [No Skin Ulcers] : no skin ulcer [No Xanthoma] : no  xanthoma was observed [Deep Tendon Reflexes (DTR)] : deep tendon reflexes were 2+ and symmetric [Sensation] : the sensory exam was normal to light touch and pinprick [No Focal Deficits] : no focal deficits [Oriented To Time, Place, And Person] : oriented to person, place, and time [Impaired Insight] : insight and judgment were intact [Affect] : the affect was normal [FreeTextEntry1] : non icteric

## 2024-02-28 NOTE — REVIEW OF SYSTEMS
[Sinus Problems] : sinus problems [As Noted in HPI] : as noted in HPI [Hypertension] : ~T hypertension [Back Pain] : ~T back pain [Anemia] : anemia [Negative] : Pulmonary Hypertension [Nasal Congestion] : no nasal congestion [Ear Disturbance] : no ear disturbance [Postnasal Drip] : no postnasal drip [Epistaxis] : no nosebleeds [Sore Throat] : no sore throat [Dry Mouth] : no dry mouth [Orthopnea] : no orthopnea [PND] : no PND [Palpitations] : no palpitations [Edema] : ~T edema was not present [Claudication] : no intermittent claudication [FreeTextEntry5] : colonoscopy 2016 [Leg Cramps] : no leg cramps [FreeTextEntry6] : management Dr Abraham Escalante

## 2024-02-28 NOTE — DISCUSSION/SUMMARY
[FreeTextEntry1] : Miold decline at flow  rates with inc use ERNESTO demonstrated significant interval improvement at the flow rates  nasal congestion  LEONARD with significant improvement of CPAP usage- addressed risks /benefits- discussed  benefits re  new RESMED Mild asthma need daily compliance with use Symbicort and rinse sinus disease with active sxs Off Spiriva    Hypertension better controlled Proteinuria with history mild renal insufficiency management PMD Heme  noted continue singulair 10 mg QD Symbicort  and prn Proair as noted LEONARD- AUTO CPAP  6 - 18 cm H20 RESMED  Proair before swimming 2 puffs Notify of any wheezing.  Notify if rescue inhaler is needed greater than 2-3 times in any week.  Avoid known triggers. Anemia w/u and noted stool negative heme renal ADDED  spiriva 2.5 mcg 2 puffs QD- HOLD  Recommendation patient to consider bariatric surgery- did  not proceed Do believe her weight contributes to her sensation of shortness of breath Referral Cyrus Valiente MD- on hold  f/u mammogram per PMD management  Colonoscopy- completed with Dr iBrch NOTED GI Dr Garza EGD f/u RENAl for proteinuria- Med f/u cardiology ECHO  4/14/21 noted  Patient compliant with CPAP therapy.  Continue present settings.  Patient with demonstrated clinical benefit with daytime and nocturnal symptomatology improvement.  DEXA  up to date with GYN Note has PCP for  overall  management and  will  defer other  medical  issues  to  her  care CPAP management visit  COVID BiValent booster up to  date  Sinus disease  medrol + Ceftin

## 2024-02-29 LAB
INFLUENZA A RESULT: NOT DETECTED
INFLUENZA B RESULT: NOT DETECTED
RESP SYN VIRUS RESULT: NOT DETECTED
SARS-COV-2 RESULT: NOT DETECTED

## 2024-03-01 ENCOUNTER — APPOINTMENT (OUTPATIENT)
Dept: CARDIOLOGY | Facility: CLINIC | Age: 59
End: 2024-03-01
Payer: COMMERCIAL

## 2024-03-01 VITALS
HEART RATE: 69 BPM | BODY MASS INDEX: 34.66 KG/M2 | HEIGHT: 69 IN | OXYGEN SATURATION: 99 % | WEIGHT: 234 LBS | DIASTOLIC BLOOD PRESSURE: 80 MMHG | TEMPERATURE: 98.4 F | SYSTOLIC BLOOD PRESSURE: 130 MMHG

## 2024-03-01 VITALS — DIASTOLIC BLOOD PRESSURE: 80 MMHG | SYSTOLIC BLOOD PRESSURE: 130 MMHG

## 2024-03-01 DIAGNOSIS — E78.00 PURE HYPERCHOLESTEROLEMIA, UNSPECIFIED: ICD-10-CM

## 2024-03-01 DIAGNOSIS — R20.0 ANESTHESIA OF SKIN: ICD-10-CM

## 2024-03-01 DIAGNOSIS — I34.0 NONRHEUMATIC MITRAL (VALVE) INSUFFICIENCY: ICD-10-CM

## 2024-03-01 DIAGNOSIS — D64.9 ANEMIA, UNSPECIFIED: ICD-10-CM

## 2024-03-01 DIAGNOSIS — R42 DIZZINESS AND GIDDINESS: ICD-10-CM

## 2024-03-01 DIAGNOSIS — R06.09 OTHER FORMS OF DYSPNEA: ICD-10-CM

## 2024-03-01 DIAGNOSIS — R20.2 ANESTHESIA OF SKIN: ICD-10-CM

## 2024-03-01 DIAGNOSIS — R07.9 CHEST PAIN, UNSPECIFIED: ICD-10-CM

## 2024-03-01 DIAGNOSIS — G89.29 HEADACHE, UNSPECIFIED: ICD-10-CM

## 2024-03-01 DIAGNOSIS — I10 ESSENTIAL (PRIMARY) HYPERTENSION: ICD-10-CM

## 2024-03-01 DIAGNOSIS — R51.9 HEADACHE, UNSPECIFIED: ICD-10-CM

## 2024-03-01 PROCEDURE — 93000 ELECTROCARDIOGRAM COMPLETE: CPT

## 2024-03-01 PROCEDURE — G2211 COMPLEX E/M VISIT ADD ON: CPT

## 2024-03-01 PROCEDURE — 99214 OFFICE O/P EST MOD 30 MIN: CPT

## 2024-03-01 NOTE — PHYSICAL EXAM
[Well Developed] : well developed [Well Nourished] : well nourished [Normal Conjunctiva] : normal conjunctiva [No Acute Distress] : no acute distress [No Carotid Bruit] : no carotid bruit [Normal Venous Pressure] : normal venous pressure [Normal S1, S2] : normal S1, S2 [No Murmur] : no murmur [No Rub] : no rub [Clear Lung Fields] : clear lung fields [No Gallop] : no gallop [Good Air Entry] : good air entry [No Respiratory Distress] : no respiratory distress  [Non Tender] : non-tender [Soft] : abdomen soft [No Masses/organomegaly] : no masses/organomegaly [Normal Bowel Sounds] : normal bowel sounds [Normal Gait] : normal gait [No Cyanosis] : no cyanosis [No Edema] : no edema [No Clubbing] : no clubbing [No Varicosities] : no varicosities [No Rash] : no rash [No Skin Lesions] : no skin lesions [Moves all extremities] : moves all extremities [No Focal Deficits] : no focal deficits [Normal memory] : normal memory [Alert and Oriented] : alert and oriented [Normal Speech] : normal speech

## 2024-03-01 NOTE — HISTORY OF PRESENT ILLNESS
[FreeTextEntry1] : This is a 57 y/o female with a pmhx of HTN, Asthma, Anemia, LEONARD, and Chronic Back pain presents today for follow up. Patient was last seen in the office 12/20/23 and HCTZ 12.5 mg was added to regimen. Patient admits to dyspnea when talking, seen by pulm and advised to continue using inhalers.  She also reports dyspnea on exertion. Patient admits to dizziness when standing from sitting position.  Patient denies chest pain,  palpitations, dizziness, syncope, changes in bowel/bladder habits or appetite. pt with  headaches on ocassion

## 2024-03-01 NOTE — REVIEW OF SYSTEMS
[Negative] : Psychiatric [Dizziness] : dizziness [SOB] : shortness of breath [de-identified] : headaches

## 2024-03-07 ENCOUNTER — NON-APPOINTMENT (OUTPATIENT)
Age: 59
End: 2024-03-07

## 2024-03-07 LAB
ALBUMIN SERPL ELPH-MCNC: 4.5 G/DL
ALP BLD-CCNC: 79 U/L
ALT SERPL-CCNC: 20 U/L
ANION GAP SERPL CALC-SCNC: 18 MMOL/L
AST SERPL-CCNC: 19 U/L
BASOPHILS # BLD AUTO: 0.03 K/UL
BASOPHILS NFR BLD AUTO: 0.7 %
BILIRUB DIRECT SERPL-MCNC: 0.1 MG/DL
BILIRUB INDIRECT SERPL-MCNC: 0.3 MG/DL
BILIRUB SERPL-MCNC: 0.4 MG/DL
BUN SERPL-MCNC: 10 MG/DL
CALCIUM SERPL-MCNC: 9.7 MG/DL
CHLORIDE SERPL-SCNC: 103 MMOL/L
CHOLEST SERPL-MCNC: 192 MG/DL
CO2 SERPL-SCNC: 23 MMOL/L
CREAT SERPL-MCNC: 0.89 MG/DL
EGFR: 75 ML/MIN/1.73M2
EOSINOPHIL # BLD AUTO: 0.1 K/UL
EOSINOPHIL NFR BLD AUTO: 2.3 %
FERRITIN SERPL-MCNC: 290 NG/ML
FOLATE SERPL-MCNC: 13.9 NG/ML
GLUCOSE SERPL-MCNC: 95 MG/DL
HAPTOGLOB SERPL-MCNC: 153 MG/DL
HCT VFR BLD CALC: 37.2 %
HDLC SERPL-MCNC: 74 MG/DL
HGB BLD-MCNC: 11.1 G/DL
IMM GRANULOCYTES NFR BLD AUTO: 0.2 %
IRON SERPL-MCNC: 105 UG/DL
LDH SERPL-CCNC: 201 U/L
LDLC SERPL CALC-MCNC: 100 MG/DL
LYMPHOCYTES # BLD AUTO: 1.75 K/UL
LYMPHOCYTES NFR BLD AUTO: 40.2 %
MAGNESIUM SERPL-MCNC: 2 MG/DL
MAN DIFF?: NORMAL
MCHC RBC-ENTMCNC: 28.9 PG
MCHC RBC-ENTMCNC: 29.8 GM/DL
MCV RBC AUTO: 96.9 FL
MONOCYTES # BLD AUTO: 0.4 K/UL
MONOCYTES NFR BLD AUTO: 9.2 %
NEUTROPHILS # BLD AUTO: 2.06 K/UL
NEUTROPHILS NFR BLD AUTO: 47.4 %
NONHDLC SERPL-MCNC: 117 MG/DL
PLATELET # BLD AUTO: 267 K/UL
POTASSIUM SERPL-SCNC: 3.8 MMOL/L
PROT SERPL-MCNC: 7.3 G/DL
RBC # BLD: 3.84 M/UL
RBC # FLD: 12.9 %
SODIUM SERPL-SCNC: 143 MMOL/L
TRANSFERRIN SERPL-MCNC: 254 MG/DL
TRIGL SERPL-MCNC: 95 MG/DL
VIT B12 SERPL-MCNC: 420 PG/ML
WBC # FLD AUTO: 4.35 K/UL

## 2024-03-12 ENCOUNTER — APPOINTMENT (OUTPATIENT)
Dept: CT IMAGING | Facility: CLINIC | Age: 59
End: 2024-03-12
Payer: COMMERCIAL

## 2024-03-12 ENCOUNTER — RESULT REVIEW (OUTPATIENT)
Age: 59
End: 2024-03-12

## 2024-03-12 ENCOUNTER — APPOINTMENT (OUTPATIENT)
Dept: MRI IMAGING | Facility: CLINIC | Age: 59
End: 2024-03-12
Payer: COMMERCIAL

## 2024-03-12 ENCOUNTER — OUTPATIENT (OUTPATIENT)
Dept: OUTPATIENT SERVICES | Facility: HOSPITAL | Age: 59
LOS: 1 days | End: 2024-03-12
Payer: COMMERCIAL

## 2024-03-12 ENCOUNTER — APPOINTMENT (OUTPATIENT)
Dept: CT IMAGING | Facility: CLINIC | Age: 59
End: 2024-03-12
Payer: SELF-PAY

## 2024-03-12 ENCOUNTER — OUTPATIENT (OUTPATIENT)
Dept: OUTPATIENT SERVICES | Facility: HOSPITAL | Age: 59
LOS: 1 days | End: 2024-03-12
Payer: SELF-PAY

## 2024-03-12 DIAGNOSIS — R51.9 HEADACHE, UNSPECIFIED: ICD-10-CM

## 2024-03-12 DIAGNOSIS — E78.00 PURE HYPERCHOLESTEROLEMIA, UNSPECIFIED: ICD-10-CM

## 2024-03-12 PROCEDURE — 70551 MRI BRAIN STEM W/O DYE: CPT

## 2024-03-12 PROCEDURE — 75571 CT HRT W/O DYE W/CA TEST: CPT | Mod: 26

## 2024-03-12 PROCEDURE — 70551 MRI BRAIN STEM W/O DYE: CPT | Mod: 26

## 2024-03-12 PROCEDURE — 75571 CT HRT W/O DYE W/CA TEST: CPT

## 2024-03-23 LAB
25(OH)D3 SERPL-MCNC: 30.9 NG/ML
25(OH)D3 SERPL-MCNC: 48.9 NG/ML
ALBUMIN MFR SERPL ELPH: 56.3 %
ALBUMIN SERPL ELPH-MCNC: 4.3 G/DL
ALBUMIN SERPL ELPH-MCNC: 4.4 G/DL
ALBUMIN SERPL ELPH-MCNC: 4.6 G/DL
ALBUMIN SERPL-MCNC: 4.4 G/DL
ALBUMIN/GLOB SERPL: 1.3 RATIO
ALBUPE: 23.9 %
ALP BLD-CCNC: 71 U/L
ALP BLD-CCNC: 72 U/L
ALP BLD-CCNC: 73 U/L
ALPHA1 GLOB MFR SERPL ELPH: 3.4 %
ALPHA1 GLOB SERPL ELPH-MCNC: 0.3 G/DL
ALPHA1UPE: 26.8 %
ALPHA2 GLOB MFR SERPL ELPH: 8.6 %
ALPHA2 GLOB SERPL ELPH-MCNC: 0.7 G/DL
ALPHA2UPE: 22.3 %
ALT SERPL-CCNC: 16 U/L
ALT SERPL-CCNC: 18 U/L
ALT SERPL-CCNC: 19 U/L
ANION GAP SERPL CALC-SCNC: 10 MMOL/L
ANION GAP SERPL CALC-SCNC: 10 MMOL/L
ANION GAP SERPL CALC-SCNC: 11 MMOL/L
ANION GAP SERPL CALC-SCNC: 11 MMOL/L
ANION GAP SERPL CALC-SCNC: 15 MMOL/L
APPEARANCE: CLEAR
AST SERPL-CCNC: 20 U/L
AST SERPL-CCNC: 21 U/L
AST SERPL-CCNC: 21 U/L
B-GLOBULIN MFR SERPL ELPH: 13.1 %
B-GLOBULIN SERPL ELPH-MCNC: 1 G/DL
BACTERIA: ABNORMAL /HPF
BACTERIA: NEGATIVE
BACTERIA: NEGATIVE
BACTERIA: NEGATIVE /HPF
BASOPHILS # BLD AUTO: 0.02 K/UL
BASOPHILS # BLD AUTO: 0.02 K/UL
BASOPHILS # BLD AUTO: 0.03 K/UL
BASOPHILS # BLD AUTO: 0.03 K/UL
BASOPHILS NFR BLD AUTO: 0.4 %
BASOPHILS NFR BLD AUTO: 0.4 %
BASOPHILS NFR BLD AUTO: 0.6 %
BASOPHILS NFR BLD AUTO: 0.7 %
BETAUPE: 11.9 %
BILIRUB DIRECT SERPL-MCNC: 0.1 MG/DL
BILIRUB DIRECT SERPL-MCNC: 0.1 MG/DL
BILIRUB INDIRECT SERPL-MCNC: 0.2 MG/DL
BILIRUB INDIRECT SERPL-MCNC: 0.2 MG/DL
BILIRUB SERPL-MCNC: 0.3 MG/DL
BILIRUB SERPL-MCNC: 0.3 MG/DL
BILIRUB SERPL-MCNC: 0.4 MG/DL
BILIRUBIN URINE: NEGATIVE
BLOOD URINE: NEGATIVE
BUN SERPL-MCNC: 10 MG/DL
BUN SERPL-MCNC: 13 MG/DL
BUN SERPL-MCNC: 14 MG/DL
BUN SERPL-MCNC: 17 MG/DL
BUN SERPL-MCNC: 19 MG/DL
CALCIUM SERPL-MCNC: 10 MG/DL
CALCIUM SERPL-MCNC: 10.1 MG/DL
CALCIUM SERPL-MCNC: 10.1 MG/DL
CALCIUM SERPL-MCNC: 10.4 MG/DL
CALCIUM SERPL-MCNC: 10.6 MG/DL
CAST: 0 /LPF
CAST: 0 /LPF
CHLORIDE SERPL-SCNC: 100 MMOL/L
CHLORIDE SERPL-SCNC: 104 MMOL/L
CHLORIDE SERPL-SCNC: 108 MMOL/L
CHOLEST SERPL-MCNC: 176 MG/DL
CHOLEST SERPL-MCNC: 183 MG/DL
CHOLEST SERPL-MCNC: 191 MG/DL
CO2 SERPL-SCNC: 25 MMOL/L
CO2 SERPL-SCNC: 25 MMOL/L
CO2 SERPL-SCNC: 26 MMOL/L
CO2 SERPL-SCNC: 27 MMOL/L
CO2 SERPL-SCNC: 27 MMOL/L
COLOR: YELLOW
CREAT SERPL-MCNC: 0.88 MG/DL
CREAT SERPL-MCNC: 0.9 MG/DL
CREAT SERPL-MCNC: 0.92 MG/DL
CREAT SERPL-MCNC: 0.98 MG/DL
CREAT SERPL-MCNC: 0.98 MG/DL
EGFR: 67 ML/MIN/1.73M2
EGFR: 67 ML/MIN/1.73M2
EGFR: 73 ML/MIN/1.73M2
EGFR: 75 ML/MIN/1.73M2
EGFR: 77 ML/MIN/1.73M2
EOSINOPHIL # BLD AUTO: 0.05 K/UL
EOSINOPHIL # BLD AUTO: 0.07 K/UL
EOSINOPHIL # BLD AUTO: 0.11 K/UL
EOSINOPHIL # BLD AUTO: 0.14 K/UL
EOSINOPHIL NFR BLD AUTO: 1 %
EOSINOPHIL NFR BLD AUTO: 1.6 %
EOSINOPHIL NFR BLD AUTO: 2.2 %
EOSINOPHIL NFR BLD AUTO: 2.8 %
EPITHELIAL CELLS: 7 /HPF
EPITHELIAL CELLS: 9 /HPF
ESTIMATED AVERAGE GLUCOSE: 105 MG/DL
FERRITIN SERPL-MCNC: 253 NG/ML
FERRITIN SERPL-MCNC: 260 NG/ML
FERRITIN SERPL-MCNC: 281 NG/ML
FOLATE SERPL-MCNC: 15.7 NG/ML
FOLATE SERPL-MCNC: 17.5 NG/ML
FOLATE SERPL-MCNC: >20 NG/ML
GAMMA GLOB FLD ELPH-MCNC: 1.5 G/DL
GAMMA GLOB MFR SERPL ELPH: 18.6 %
GAMMAUPE: 15.1 %
GLUCOSE QUALITATIVE U: NEGATIVE
GLUCOSE QUALITATIVE U: NEGATIVE
GLUCOSE QUALITATIVE U: NEGATIVE MG/DL
GLUCOSE QUALITATIVE U: NEGATIVE MG/DL
GLUCOSE SERPL-MCNC: 100 MG/DL
GLUCOSE SERPL-MCNC: 84 MG/DL
GLUCOSE SERPL-MCNC: 87 MG/DL
GLUCOSE SERPL-MCNC: 89 MG/DL
GLUCOSE SERPL-MCNC: 91 MG/DL
HBA1C MFR BLD HPLC: 5.3 %
HCT VFR BLD CALC: 34.9 %
HCT VFR BLD CALC: 36.7 %
HCT VFR BLD CALC: 37.2 %
HCT VFR BLD CALC: 38.4 %
HDLC SERPL-MCNC: 64 MG/DL
HDLC SERPL-MCNC: 66 MG/DL
HDLC SERPL-MCNC: 67 MG/DL
HGB BLD-MCNC: 10.8 G/DL
HGB BLD-MCNC: 11.2 G/DL
HGB BLD-MCNC: 11.7 G/DL
HGB BLD-MCNC: 12.1 G/DL
HYALINE CASTS: 1 /LPF
HYALINE CASTS: 4 /LPF
IGA 24H UR QL IFE: NORMAL
IMM GRANULOCYTES NFR BLD AUTO: 0 %
IMM GRANULOCYTES NFR BLD AUTO: 0.2 %
INTERPRETATION SERPL IEP-IMP: NORMAL
IRON SATN MFR SERPL: 31 %
IRON SATN MFR SERPL: 35 %
IRON SATN MFR SERPL: 37 %
IRON SERPL-MCNC: 104 UG/DL
IRON SERPL-MCNC: 106 UG/DL
IRON SERPL-MCNC: 119 UG/DL
KAPPA LC 24H UR QL: NORMAL
KETONES URINE: NEGATIVE
KETONES URINE: NEGATIVE
KETONES URINE: NEGATIVE MG/DL
KETONES URINE: NEGATIVE MG/DL
LDLC SERPL CALC-MCNC: 103 MG/DL
LDLC SERPL CALC-MCNC: 107 MG/DL
LDLC SERPL CALC-MCNC: 96 MG/DL
LEUKOCYTE ESTERASE URINE: ABNORMAL
LEUKOCYTE ESTERASE URINE: NEGATIVE
LYMPHOCYTES # BLD AUTO: 1.74 K/UL
LYMPHOCYTES # BLD AUTO: 2.04 K/UL
LYMPHOCYTES # BLD AUTO: 2.05 K/UL
LYMPHOCYTES # BLD AUTO: 2.09 K/UL
LYMPHOCYTES NFR BLD AUTO: 39.1 %
LYMPHOCYTES NFR BLD AUTO: 40.8 %
LYMPHOCYTES NFR BLD AUTO: 41.5 %
LYMPHOCYTES NFR BLD AUTO: 41.5 %
MAGNESIUM SERPL-MCNC: 2 MG/DL
MAGNESIUM SERPL-MCNC: 2.1 MG/DL
MAGNESIUM SERPL-MCNC: 2.1 MG/DL
MAN DIFF?: NORMAL
MCHC RBC-ENTMCNC: 28.8 PG
MCHC RBC-ENTMCNC: 29 PG
MCHC RBC-ENTMCNC: 29.5 PG
MCHC RBC-ENTMCNC: 29.7 PG
MCHC RBC-ENTMCNC: 30.5 GM/DL
MCHC RBC-ENTMCNC: 30.9 GM/DL
MCHC RBC-ENTMCNC: 31.5 GM/DL
MCHC RBC-ENTMCNC: 31.5 GM/DL
MCV RBC AUTO: 93.7 FL
MCV RBC AUTO: 93.8 FL
MCV RBC AUTO: 94.3 FL
MCV RBC AUTO: 94.3 FL
MICROSCOPIC-UA: NORMAL
MONOCYTES # BLD AUTO: 0.34 K/UL
MONOCYTES # BLD AUTO: 0.37 K/UL
MONOCYTES # BLD AUTO: 0.39 K/UL
MONOCYTES # BLD AUTO: 0.44 K/UL
MONOCYTES NFR BLD AUTO: 6.6 %
MONOCYTES NFR BLD AUTO: 7.5 %
MONOCYTES NFR BLD AUTO: 8.8 %
MONOCYTES NFR BLD AUTO: 9 %
NEUTROPHILS # BLD AUTO: 2.21 K/UL
NEUTROPHILS # BLD AUTO: 2.29 K/UL
NEUTROPHILS # BLD AUTO: 2.36 K/UL
NEUTROPHILS # BLD AUTO: 2.6 K/UL
NEUTROPHILS NFR BLD AUTO: 46.7 %
NEUTROPHILS NFR BLD AUTO: 47.8 %
NEUTROPHILS NFR BLD AUTO: 49.6 %
NEUTROPHILS NFR BLD AUTO: 50.8 %
NITRITE URINE: NEGATIVE
NONHDLC SERPL-MCNC: 112 MG/DL
NONHDLC SERPL-MCNC: 117 MG/DL
NONHDLC SERPL-MCNC: 124 MG/DL
PH URINE: 5.5
PH URINE: 5.5
PH URINE: 6
PH URINE: 6.5
PLATELET # BLD AUTO: 278 K/UL
PLATELET # BLD AUTO: 296 K/UL
PLATELET # BLD AUTO: 300 K/UL
PLATELET # BLD AUTO: 300 K/UL
POTASSIUM SERPL-SCNC: 3.7 MMOL/L
POTASSIUM SERPL-SCNC: 3.8 MMOL/L
POTASSIUM SERPL-SCNC: 4 MMOL/L
POTASSIUM SERPL-SCNC: 4.3 MMOL/L
POTASSIUM SERPL-SCNC: 4.5 MMOL/L
PROT PATTERN 24H UR ELPH-IMP: NORMAL
PROT SERPL-MCNC: 7.5 G/DL
PROT SERPL-MCNC: 7.7 G/DL
PROT SERPL-MCNC: 7.8 G/DL
PROT SERPL-MCNC: 7.8 G/DL
PROT SERPL-MCNC: 8.4 G/DL
PROT UR-MCNC: 6 MG/DL
PROT UR-MCNC: 6 MG/DL
PROTEIN URINE: NEGATIVE
PROTEIN URINE: NEGATIVE
PROTEIN URINE: NEGATIVE MG/DL
PROTEIN URINE: NEGATIVE MG/DL
RBC # BLD: 3.72 M/UL
RBC # BLD: 3.89 M/UL
RBC # BLD: 3.97 M/UL
RBC # BLD: 4.07 M/UL
RBC # FLD: 12.4 %
RBC # FLD: 12.6 %
RBC # FLD: 12.7 %
RBC # FLD: 12.8 %
RED BLOOD CELLS URINE: 1 /HPF
RED BLOOD CELLS URINE: 2 /HPF
RED BLOOD CELLS URINE: 3 /HPF
RED BLOOD CELLS URINE: 4 /HPF
SODIUM SERPL-SCNC: 139 MMOL/L
SODIUM SERPL-SCNC: 141 MMOL/L
SODIUM SERPL-SCNC: 141 MMOL/L
SODIUM SERPL-SCNC: 142 MMOL/L
SODIUM SERPL-SCNC: 143 MMOL/L
SPECIFIC GRAVITY URINE: 1.02
SQUAMOUS EPITHELIAL CELLS: 3 /HPF
SQUAMOUS EPITHELIAL CELLS: 4 /HPF
T4 FREE SERPL-MCNC: 0.9 NG/DL
T4 FREE SERPL-MCNC: 0.9 NG/DL
TIBC SERPL-MCNC: 297 UG/DL
TIBC SERPL-MCNC: 320 UG/DL
TIBC SERPL-MCNC: 336 UG/DL
TRANSFERRIN SERPL-MCNC: 286 MG/DL
TRIGL SERPL-MCNC: 67 MG/DL
TRIGL SERPL-MCNC: 78 MG/DL
TRIGL SERPL-MCNC: 95 MG/DL
TSH SERPL-ACNC: 0.62 UIU/ML
TSH SERPL-ACNC: 0.72 UIU/ML
UIBC SERPL-MCNC: 193 UG/DL
UIBC SERPL-MCNC: 201 UG/DL
UIBC SERPL-MCNC: 230 UG/DL
UROBILINOGEN URINE: 0.2 MG/DL
UROBILINOGEN URINE: 0.2 MG/DL
UROBILINOGEN URINE: NORMAL
UROBILINOGEN URINE: NORMAL
VIT B12 SERPL-MCNC: 402 PG/ML
VIT B12 SERPL-MCNC: 459 PG/ML
VIT B12 SERPL-MCNC: 459 PG/ML
WBC # FLD AUTO: 4.45 K/UL
WBC # FLD AUTO: 4.91 K/UL
WBC # FLD AUTO: 4.94 K/UL
WBC # FLD AUTO: 5.12 K/UL
WHITE BLOOD CELLS URINE: 2 /HPF
WHITE BLOOD CELLS URINE: 2 /HPF
WHITE BLOOD CELLS URINE: 4 /HPF
WHITE BLOOD CELLS URINE: 4 /HPF

## 2024-03-24 ENCOUNTER — NON-APPOINTMENT (OUTPATIENT)
Age: 59
End: 2024-03-24

## 2024-03-25 ENCOUNTER — APPOINTMENT (OUTPATIENT)
Dept: ORTHOPEDIC SURGERY | Facility: CLINIC | Age: 59
End: 2024-03-25
Payer: COMMERCIAL

## 2024-03-25 VITALS
HEIGHT: 69 IN | SYSTOLIC BLOOD PRESSURE: 123 MMHG | BODY MASS INDEX: 33.92 KG/M2 | DIASTOLIC BLOOD PRESSURE: 83 MMHG | WEIGHT: 229 LBS | HEART RATE: 74 BPM

## 2024-03-25 DIAGNOSIS — M54.9 DORSALGIA, UNSPECIFIED: ICD-10-CM

## 2024-03-25 PROCEDURE — 99214 OFFICE O/P EST MOD 30 MIN: CPT

## 2024-03-25 NOTE — HISTORY OF PRESENT ILLNESS
[de-identified] : Patient is a 58 year old female who presents for f/u eval of neck and low bp. Today, the patient reports her back pain is managed with PT. Details recent fall on 03.02. Denies any LE sxs.  Patient detail numbness in her RT wrist d/t carpal tunnel sxs.   01.22.24 59 yo female following up for her neck and low back pain. She had an EMG back in March 2023 that noted bilateral carpal tunnel, R>L. She denies any bowel or bladder dysfunction or saddle anesthesia. She has not started another round of physical therapy and is requesting a referral. She notes feeling stiffness in her neck and back.   12/11/2023 59 yo female following up for her low back pain and right sided radiculopathy down her buttocks into proximal posterior thigh. She recently came back from a trip to [place] where she did an extensive amount of walking. She states she did have pain but was able to walk. She is using lidocaine patches and Tylenol. Denies any bowel or bladder dysfunction or saddle anesthesia. She recently had an MRI of her lumbar spine and is here to review results and discuss next steps in care. She had been going to physical therapy but wants to change locations as she did not find it very helpful.   10/12/2023 This is a 58-year-old female who is here for evaluation and treatment of her low back pain.  Apparently 3 weeks ago she had a fairly significant fall buttocks and back.  Since that time she has been dealing with some substantial low back and buttock pain.  It is interfering with her quality of life and ability to perform her actives of daily living.  She is here today to discuss neck steps in her care.

## 2024-03-25 NOTE — ASSESSMENT
[FreeTextEntry1] : I had a lengthy discussion with the patient in regard to treatment plan and diagnosis. There are no red flag findings on imaging nor are there any red flag findings on clinical exams. Therefore, we will proceed with a course of conservative treatment. This would include physical therapy/home exercise program, occupational therapy, Tylenol, NSAIDs as medically indicated. The patient will follow up with me prn. I encouraged the patient to follow-up sooner if there are any new or worsening symptoms.

## 2024-03-25 NOTE — ADDENDUM
[FreeTextEntry1] :  I, Annie Melendez, acted solely as a scribe for Dr. Maxx Rivero MD on this date 03/25/2024   All medical record entries made by the Scribe were at my, Dr. Maxx Rivero MD., direction and personally dictated by me on 03/25/2024. I have reviewed the chart and agree that the record accurately reflects my personal performance of the history, physical exam, assessment and plan. I have also personally directed, reviewed, and agreed with the chart.

## 2024-03-25 NOTE — PHYSICAL EXAM
[de-identified] : Previous imaging MRI lumbar spine  Diffuse disc degeneration No significant stenosis  Sacrum radiographs No fracture noted Overall alignment stable. [de-identified] : Lumbar Physical Exam  Gait - Normal  Station - Normal  Sagittal balance - Normal  Compensatory mechanism? - None  Heel walk - Normal  Toe walk - Normal  Reflexes Patellar - normal Gastroc - normal Clonus - No  Hip Exam - Normal  Straight leg raise - none  Pulses - 2+ dp/pt  Range of motion - normal  Sensation  Sensation intact to light touch in L1, L2, L3, L4, L5 and S1 dermatomes bilaterally  Motor 	IP	Quad	HS	TA	Gastroc	EHL Right 5/5	5/5	5/5	5/5	5/5	5/5 Left	5/5	5/5	5/5	5/5	5/5	5/5

## 2024-04-01 ENCOUNTER — APPOINTMENT (OUTPATIENT)
Dept: PULMONOLOGY | Facility: CLINIC | Age: 59
End: 2024-04-01
Payer: COMMERCIAL

## 2024-04-01 VITALS
HEART RATE: 83 BPM | DIASTOLIC BLOOD PRESSURE: 79 MMHG | OXYGEN SATURATION: 100 % | RESPIRATION RATE: 16 BRPM | SYSTOLIC BLOOD PRESSURE: 118 MMHG

## 2024-04-01 PROCEDURE — 94060 EVALUATION OF WHEEZING: CPT

## 2024-04-01 PROCEDURE — 99214 OFFICE O/P EST MOD 30 MIN: CPT | Mod: 25

## 2024-04-01 RX ORDER — BUDESONIDE AND FORMOTEROL FUMARATE 160; 4.5 UG/1; UG/1
160-4.5 AEROSOL, METERED RESPIRATORY (INHALATION)
Qty: 1 | Refills: 5 | Status: ACTIVE | COMMUNITY
Start: 2023-09-01 | End: 1900-01-01

## 2024-04-01 NOTE — PROCEDURE
[FreeTextEntry1] : JAVY 4/1/24  very mild OAD  pos asx decline  CPAP data compliance through March 31, 2024 Usage 93%   6 CPAP 6 to 18 cm H2O AHI 1.3 Positive goal  Spirometry February 28, 2024 Flow rates Ratio is 77 No bronchodilator response in FEV1  Chest x-ray PA lateral February 28, 2024 Cardiac size is normal Clear lung fields No parenchymal infiltrate pleural effusion or dominant pulmonary nodules Soft tissue bony structures unremarkable There is a tiny right lower lung zone faint nodule that is longstanding chronic dating back to x-rays of 2018 confirmed with    JAVY  11/27/23  Mild OAD asx  PFT 10/16/23 minimal OAD lung volumes nl  DLCO 92 % HGB 12.1  JAVY  9/1/23  minimal OAD  ratio 76  CPAP data compliance adherence through August 31, 2023 Usage 93% Hours average greater than 7 AutoSet CPAP 6 to 18 cm H2O AHI 1.3 Continue current management  JAVY 5/22/23  Flow rates nl  ratio 77 nl stable pulmonary physiology  Spirometry no bronchodilator February 27, 2023 Flow rates are normal Ratio 76 Interval improvement of flow rates compared to data of November 21, 2022  Chest x-ray PA lateral 2/27/23 Normal cardiac size Lung fields are clear Impression infiltrates pleural effusions or dominant pulmonary nodules There is a tiny right lower lobe sub-6 cm well-circumscribed nodule that has been stable dating back to at least March 2019 was consistent with a postinflammatory nodule/granuloma  PFT 11/2/122 Flow rates nl  Mild OAD Lung volumes nl  DLCO 93 % HGB 10.8  Javy 10/10/22 flow rates nl  range  stable  DEXA 10/10/22  Left femoral neck nl Total Left hip nl AP L1- L4 nl  normal study f/u 2 - 5 years  CPAP data through October 9, 2022 87% AutoSet CPAP 6 to 15 cm H2O Greater than 7 AHI 3.8  Javy 8/25/22 flow rates nl stable FEV1  CPAP data compliance 8/24/22 97 % hours >7  AUTO CPAP 6-18 cm H20 AHI 2.5 pod occ airleak  CPAP_ RESMED 6/23/22 pos  airleak  PFT 5/13/22 Flow rates nl Lung Volumes nl DLCO 109 % HGB 11.2  Sleep study February 25 February 27, 2022 Severe obstructive sleep apnea positional component AHI 33 Follow-up post treatment with overnight oximetry    PFT 2/28/22 Very mild OAD TLC 86 %  normal lung volumes ERV 25 % sec overweight  DLCO  88 % HGB 11.9  Chest x-ray PA lateral February 18, 2022 Normal cardiac size Left midlung zone intrapulmonary lymph node versus end on vascular Not exclude tiny faint subcentimeter density right lower lobe No parenchymal infiltrates No pneumothorax No pleural effusion Roxie mediastinum unremarkable Soft tissue bony structures unremarkable Comparison to January 29, 2021 chest x-ray no interval change Right lower lobe most consistent with a subcentimeter granuloma  PFT 11/12/21 Normal flow rates  lung volumes nl  DLCO 86 % pred WNL HGB 11.1  PFT 8/20/21 flow rates nl  mild OAD TLC 94 RES IS INC SP CONDUCTANCE DEC DLCO 88 % interval improvement of flow rates  PFT 5/12/21 mILD oad lUNG vOLUMES NL DLCO 80 % wnl hgb 11.9  PFT 2/22/21 Normal  flow rates  minimal OAD Normal  lung volumes borderline airtrapping  Normal DLCO  84 %  pred  HGB 11.7  Chest x-ray PA lateral January 29, 2021 Normal cardiac size Clear lungs No parenchymal infiltrates pleural effusions with dominant pulmonary nodules left well-circumscribed intrapulmonary lymph node Tiny nodularity noted right lower lung zone most consistent with granuloma dating back  2 years  PFT Nov 23 2020  Mild OAD normal lung volumes Normal DLCO  HGB 11.4  Interval improvement at Flow Rates   NIOX 69 PPD October 22, 2020 PFT October 22, 2020 Mild reduction flow rates with a mild obstructive ventilatory impairment No significant response to bronchodilator at the FEV1 measuring 7% Borderline response at the small airways of 15% Lung volumes demonstrate normal total lung capacity Air trapping with RV/TLC ratio 38% predicted and a decreased ERV likely secondary to patient increased abdominal girth Diffusion normal 100% predicted. Hemoglobin 11.4 Data compared to August 11, 2020 does demonstrate some mild reduction at the flow rates  DEXA 9/17/20 normal study  8/11/2020 NIOX 50 PFT  spirometry normal 30% response to bronchodilator at the FEV1 Normal lung volumes Diffusion normal 92% predicted. Positive bronchodilator response  Chest x-ray PA lateral March 9, 2020 Normal cardiac size Clear lung fields without parenchymal infiltrates pleural effusions dominant pulmonary nodules Tissue bony structures normal Roxie mediastinum normal Impression clear lungs   CPAP DATA data    3/14/22 usage 93 % Hours > 6 Auto CPAp 6 - 18 cm h20 AHI 1.9  PFIZER completed vaccine  Echocardiogram August 14, 2021 Mild concentric LVH Impaired LV relaxation with normal filling pressure Myxomatous mitral valve Mild to moderate mitral regurgitation Mild tricuspid regurgitation Normal global LV systolic function Stress test treadmill August 14, 2021 No reported ischemic changes No arrhythmia noted  Flu vaccination intramuscular October 16 2023

## 2024-04-01 NOTE — DISCUSSION/SUMMARY
[FreeTextEntry1] : Miold decline at flow  rates with inc use ERNESTO demonstrated significant interval improvement at the flow rates   LEONARD with significant improvement of CPAP usage- addressed risks /benefits- discussed  benefits re  new RESMED Mild asthma need daily compliance with use Symbicort and rinse sinus disease with active sxs Off Spiriva    Hypertension better controlled Proteinuria with history mild renal insufficiency management PMD Heme  noted continue singulair 10 mg QD Symbicort  and prn Proair as noted LEONARD- AUTO CPAP  6 - 18 cm H20 RESMED  Proair before swimming 2 puffs Notify of any wheezing.  Notify if rescue inhaler is needed greater than 2-3 times in any week.  Avoid known triggers. Anemia w/u and noted stool negative heme renal ADDED  spiriva 2.5 mcg 2 puffs QD- HOLD  Recommendation patient to consider bariatric surgery- did  not proceed Do believe her weight contributes to her sensation of shortness of breath Referral Cyrus Valiente MD- on hold  f/u mammogram per PMD management  Colonoscopy- completed with Dr Birch NOTED GI Dr Garza EGD f/u RENAl for proteinuria- Med f/u cardiology ECHO  4/14/21 noted  Patient compliant with CPAP therapy.  Continue present settings.  Patient with demonstrated clinical benefit with daytime and nocturnal symptomatology improvement.  DEXA  up to date with GYN Note has PCP for  overall  management and  will  defer other  medical  issues  to  her  care CPAP management visit  COVID BiValent booster up to  date  Sinus disease  medrol + Ceftin

## 2024-04-01 NOTE — HISTORY OF PRESENT ILLNESS
[Wt Gain ___ Lbs] : no recent weight gain [Oxygen] : the patient uses no supplemental oxygen [Wt Loss ___ Lbs] : no recent weight loss [Nocturnal Oxygen] : The patient does not use nocturnal oxygen [FreeTextEntry1] : AUTO  CPAP 6-18cm H20

## 2024-04-01 NOTE — PHYSICAL EXAM
[Alert] : alert [Normal Voice/Communication] : normal voice/communication [Healthy Appearing] : healthy appearing [Hearing Threshold Finger Rub Not [place]] : hearing was normal [No Acute Distress] : no acute distress [Sclera] : the sclera and conjunctiva were normal [Normal Lips/Gums] : the lips and gums were normal [Oropharynx] : the oropharynx was normal [Normal Appearance] : the appearance of the neck was normal [No Neck Mass] : no neck mass was observed [No Respiratory Distress] : no respiratory distress [No Acc Muscle Use] : no accessory muscle use [Respiration, Rhythm And Depth] : normal respiratory rhythm and effort [Auscultation Breath Sounds / Voice Sounds] : lungs were clear to auscultation bilaterally [Heart Rate And Rhythm] : heart rate was normal and rhythm regular [Normal S1, S2] : normal S1 and S2 [Murmurs] : no murmurs [Bowel Sounds] : normal bowel sounds [Abdomen Tenderness] : non-tender [No Masses] : no abdominal mass palpated [Abdomen Soft] : soft [] : no hepatosplenomegaly [Oriented To Time, Place, And Person] : oriented to person, place, and time Former smoker

## 2024-04-01 NOTE — REVIEW OF SYSTEMS
[Ear Disturbance] : no ear disturbance [Epistaxis] : no nosebleeds [Nasal Congestion] : no nasal congestion [Postnasal Drip] : no postnasal drip [Sore Throat] : no sore throat [Dry Mouth] : no dry mouth [Orthopnea] : no orthopnea [PND] : no PND [Palpitations] : no palpitations [Edema] : ~T edema was not present [Leg Cramps] : no leg cramps [Claudication] : no intermittent claudication [FreeTextEntry5] : colonoscopy 2016 [FreeTextEntry6] : management Dr Abraham Escalante

## 2024-04-23 ENCOUNTER — OUTPATIENT (OUTPATIENT)
Dept: OUTPATIENT SERVICES | Facility: HOSPITAL | Age: 59
LOS: 1 days | End: 2024-04-23
Payer: COMMERCIAL

## 2024-04-23 ENCOUNTER — APPOINTMENT (OUTPATIENT)
Dept: ULTRASOUND IMAGING | Facility: IMAGING CENTER | Age: 59
End: 2024-04-23
Payer: COMMERCIAL

## 2024-04-23 ENCOUNTER — APPOINTMENT (OUTPATIENT)
Dept: MAMMOGRAPHY | Facility: IMAGING CENTER | Age: 59
End: 2024-04-23
Payer: COMMERCIAL

## 2024-04-23 DIAGNOSIS — Z00.8 ENCOUNTER FOR OTHER GENERAL EXAMINATION: ICD-10-CM

## 2024-04-23 PROCEDURE — 76641 ULTRASOUND BREAST COMPLETE: CPT

## 2024-04-23 PROCEDURE — 77067 SCR MAMMO BI INCL CAD: CPT | Mod: 26

## 2024-04-23 PROCEDURE — 77063 BREAST TOMOSYNTHESIS BI: CPT | Mod: 26

## 2024-04-23 PROCEDURE — 76641 ULTRASOUND BREAST COMPLETE: CPT | Mod: 26,50

## 2024-04-23 PROCEDURE — 77067 SCR MAMMO BI INCL CAD: CPT

## 2024-04-23 PROCEDURE — 77063 BREAST TOMOSYNTHESIS BI: CPT

## 2024-04-24 ENCOUNTER — RX RENEWAL (OUTPATIENT)
Age: 59
End: 2024-04-24

## 2024-04-24 RX ORDER — MONTELUKAST 10 MG/1
10 TABLET, FILM COATED ORAL
Qty: 30 | Refills: 5 | Status: ACTIVE | COMMUNITY
Start: 2018-11-27 | End: 1900-01-01

## 2024-05-13 ENCOUNTER — APPOINTMENT (OUTPATIENT)
Dept: PULMONOLOGY | Facility: CLINIC | Age: 59
End: 2024-05-13
Payer: COMMERCIAL

## 2024-05-13 VITALS — DIASTOLIC BLOOD PRESSURE: 84 MMHG | HEART RATE: 69 BPM | SYSTOLIC BLOOD PRESSURE: 128 MMHG | OXYGEN SATURATION: 97 %

## 2024-05-13 PROCEDURE — 94060 EVALUATION OF WHEEZING: CPT

## 2024-05-13 PROCEDURE — 99214 OFFICE O/P EST MOD 30 MIN: CPT | Mod: 25

## 2024-05-13 RX ORDER — AZELASTINE HYDROCHLORIDE 137 UG/1
0.1 SPRAY, METERED NASAL
Qty: 30 | Refills: 3 | Status: ACTIVE | COMMUNITY
Start: 2019-01-14 | End: 1900-01-01

## 2024-05-13 NOTE — HISTORY OF PRESENT ILLNESS
[Stable] : are stable [None] : ~He/She~ has no significant interval events [Difficulty Breathing During Exertion] : stable dyspnea on exertion [Feelings Of Weakness On Exertion] : stable exercise intolerance [Cough] : denies coughing [Wheezing] : denies wheezing [Regional Soft Tissue Swelling Both Lower Extremities] : denies lower extremity edema [Chest Pain Or Discomfort] : denies chest pain [Fever] : denies fever [Obstructive Sleep Apnea] : obstructive sleep apnea [Date: ___] : Date of most recent diagnostic polysomnogram: [unfilled] [AHI: ___ per hour] : Apnea-hypopnea index:  [unfilled] per hour [Wt Gain ___ Lbs] : no recent weight gain [Wt Loss ___ Lbs] : no recent weight loss [Oxygen] : the patient uses no supplemental oxygen [Nocturnal Oxygen] : The patient does not use nocturnal oxygen [FreeTextEntry1] : sinus sxs chronic more active nasal sinus  congestion with use Astelin some chest congestion completed prior steroid + ceftin occ use ERNESTO 3- 4xs per  week not using symbicort thought it was equivalent to the singulair  ? allergy component better controlled with Rx nasal + generic singulair HEME 56yo F with PMH of asthma, morbid obesity, sleep apnea on CPAP, HTN, proteinuria. Patient was referred for evaluation and management of anemia.  Laboratory studies CBC 6/24/19 : WBC 4.4, Hb 11.7, RBC 3.87, MCV 92.5, Hct 35.9%, RDW 11.9%, PLTs 257.  6/24/19 iron studies were normal Ferritin 230 Vitamin B 12 429 Folate 16 Light chain kappa 2.89 mildly elevated but kappa/ lambda ratio were normal.  Immunofixation was negative for monoclonal ban  no active asthma sxs no inc use ERNESTO  issue with CPAP use  does use So Clean but was not compliant - advised not to use no viral  no travel non sp  lumbar back pain Notes some component  left hip  pain with numbness  radiation left HEME Noted GI noted

## 2024-05-16 ENCOUNTER — RX RENEWAL (OUTPATIENT)
Age: 59
End: 2024-05-16

## 2024-06-17 ENCOUNTER — RX RENEWAL (OUTPATIENT)
Age: 59
End: 2024-06-17

## 2024-06-17 RX ORDER — FLUTICASONE PROPIONATE 50 UG/1
50 SPRAY, METERED NASAL
Qty: 16 | Refills: 3 | Status: ACTIVE | COMMUNITY
Start: 2021-11-12 | End: 1900-01-01

## 2024-06-17 RX ORDER — OLMESARTAN MEDOXOMIL 20 MG/1
20 TABLET, FILM COATED ORAL
Qty: 90 | Refills: 1 | Status: ACTIVE | COMMUNITY
Start: 2023-12-06 | End: 1900-01-01

## 2024-06-24 ENCOUNTER — APPOINTMENT (OUTPATIENT)
Dept: PULMONOLOGY | Facility: CLINIC | Age: 59
End: 2024-06-24
Payer: COMMERCIAL

## 2024-06-24 VITALS
OXYGEN SATURATION: 98 % | DIASTOLIC BLOOD PRESSURE: 88 MMHG | HEART RATE: 75 BPM | SYSTOLIC BLOOD PRESSURE: 141 MMHG | RESPIRATION RATE: 16 BRPM

## 2024-06-24 VITALS — SYSTOLIC BLOOD PRESSURE: 118 MMHG | DIASTOLIC BLOOD PRESSURE: 60 MMHG

## 2024-06-24 DIAGNOSIS — G47.33 OBSTRUCTIVE SLEEP APNEA (ADULT) (PEDIATRIC): ICD-10-CM

## 2024-06-24 DIAGNOSIS — J45.909 UNSPECIFIED ASTHMA, UNCOMPLICATED: ICD-10-CM

## 2024-06-24 PROCEDURE — 99214 OFFICE O/P EST MOD 30 MIN: CPT | Mod: 25

## 2024-06-24 PROCEDURE — 94060 EVALUATION OF WHEEZING: CPT

## 2024-07-29 ENCOUNTER — APPOINTMENT (OUTPATIENT)
Dept: ORTHOPEDIC SURGERY | Facility: CLINIC | Age: 59
End: 2024-07-29
Payer: COMMERCIAL

## 2024-07-29 VITALS — HEIGHT: 69 IN | BODY MASS INDEX: 34.07 KG/M2 | WEIGHT: 230 LBS

## 2024-07-29 PROCEDURE — 99214 OFFICE O/P EST MOD 30 MIN: CPT

## 2024-07-29 NOTE — ASSESSMENT
[FreeTextEntry1] : I had a lengthy discussion with the patient in regard to treatment plan and diagnosis. There are no red flag findings on imaging nor are there any red flag findings on clinical exams. Therefore, we will proceed with a course of conservative treatment. This would include physical therapy/home exercise program, Tylenol, NSAIDs as medically indicated. Encouraged patient to follow up with Podiatrist for treatment of LT foot symptoms. The patient will follow up with me prn. I encouraged the patient to follow-up sooner if there are any new or worsening symptoms.

## 2024-07-29 NOTE — HISTORY OF PRESENT ILLNESS
[de-identified] : Patient is a 59 year old female who presents for f/u eval of neck and lower bp. Patient states sxs have been improving gradually. Persistent burning in the sole of LT foot. Followed up w/ podiatrist and was diagnosed w/ plantar fasciitis.   03.25.24 Patient is a 58 year old female who presents for f/u eval of neck and low bp. Today, the patient reports her back pain is managed with PT. Details recent fall on 03.02. Denies any LE sxs.  Patient detail numbness in her RT wrist d/t carpal tunnel sxs.   01.22.24 59 yo female following up for her neck and low back pain. She had an EMG back in March 2023 that noted bilateral carpal tunnel, R>L. She denies any bowel or bladder dysfunction or saddle anesthesia. She has not started another round of physical therapy and is requesting a referral. She notes feeling stiffness in her neck and back.   12/11/2023 59 yo female following up for her low back pain and right sided radiculopathy down her buttocks into proximal posterior thigh. She recently came back from a trip to [place] where she did an extensive amount of walking. She states she did have pain but was able to walk. She is using lidocaine patches and Tylenol. Denies any bowel or bladder dysfunction or saddle anesthesia. She recently had an MRI of her lumbar spine and is here to review results and discuss next steps in care. She had been going to physical therapy but wants to change locations as she did not find it very helpful.   10/12/2023 This is a 58-year-old female who is here for evaluation and treatment of her low back pain.  Apparently 3 weeks ago she had a fairly significant fall buttocks and back.  Since that time she has been dealing with some substantial low back and buttock pain.  It is interfering with her quality of life and ability to perform her actives of daily living.  She is here today to discuss neck steps in her care.

## 2024-07-29 NOTE — PHYSICAL EXAM
[de-identified] : Lumbar Physical Exam  Gait - Normal  Station - Normal  Sagittal balance - Normal  Compensatory mechanism? - None  Heel walk - Normal  Toe walk - Normal  Reflexes Patellar - normal Gastroc - normal Clonus - No  Hip Exam - Normal  Straight leg raise - none  Pulses - 2+ dp/pt  Range of motion - normal  Sensation  Sensation intact to light touch in L1, L2, L3, L4, L5 and S1 dermatomes bilaterally  Motor 	IP	Quad	HS	TA	Gastroc	EHL Right 5/5	5/5	5/5	5/5	5/5	5/5 Left	5/5	5/5	5/5	5/5	5/5	5/5 [de-identified] : Previous imaging MRI lumbar spine  Diffuse disc degeneration No significant stenosis  Sacrum radiographs No fracture noted Overall alignment stable.

## 2024-07-29 NOTE — ADDENDUM
[FreeTextEntry1] :  I, Annie Melendez, acted solely as a scribe for Dr. Maxx Rivero MD on this date 07/29/2024   All medical record entries made by the Scribe were at my, Dr. Maxx Rivero MD., direction and personally dictated by me on 07/29/2024. I have reviewed the chart and agree that the record accurately reflects my personal performance of the history, physical exam, assessment and plan. I have also personally directed, reviewed, and agreed with the chart.

## 2024-08-01 ENCOUNTER — APPOINTMENT (OUTPATIENT)
Dept: PULMONOLOGY | Facility: CLINIC | Age: 59
End: 2024-08-01
Payer: COMMERCIAL

## 2024-08-01 VITALS — SYSTOLIC BLOOD PRESSURE: 136 MMHG | HEART RATE: 80 BPM | DIASTOLIC BLOOD PRESSURE: 85 MMHG | OXYGEN SATURATION: 100 %

## 2024-08-01 PROCEDURE — 94060 EVALUATION OF WHEEZING: CPT

## 2024-08-01 PROCEDURE — 99214 OFFICE O/P EST MOD 30 MIN: CPT | Mod: 25

## 2024-08-01 NOTE — PHYSICAL EXAM
[General Appearance - Well Developed] : well developed [Normal Appearance] : normal appearance [Well Groomed] : well groomed [General Appearance - Well Nourished] : well nourished [No Deformities] : no deformities [General Appearance - In No Acute Distress] : no acute distress [Normal Conjunctiva] : the conjunctiva exhibited no abnormalities [Eyelids - No Xanthelasma] : the eyelids demonstrated no xanthelasmas [Normal Oropharynx] : normal oropharynx [II] : II [Neck Cervical Mass (___cm)] : no neck mass was observed [Neck Appearance] : the appearance of the neck was normal [Jugular Venous Distention Increased] : there was no jugular-venous distention [Thyroid Diffuse Enlargement] : the thyroid was not enlarged [Heart Rate And Rhythm] : heart rate and rhythm were normal [Heart Sounds] : normal S1 and S2 [Murmurs] : no murmurs present [Arterial Pulses Normal] : the arterial pulses were normal [Edema] : no peripheral edema present [Veins - Varicosity Changes] : no varicosital changes were noted in the lower extremities [Respiration, Rhythm And Depth] : normal respiratory rhythm and effort [Exaggerated Use Of Accessory Muscles For Inspiration] : no accessory muscle use [Auscultation Breath Sounds / Voice Sounds] : lungs were clear to auscultation bilaterally [Chest Palpation] : palpation of the chest revealed no abnormalities [Lungs Percussion] : the lungs were normal to percussion [Abdomen Soft] : soft [Abdomen Tenderness] : non-tender [Abdomen Mass (___ Cm)] : no abdominal mass palpated [Abnormal Walk] : normal gait [Gait - Sufficient For Exercise Testing] : the gait was sufficient for exercise testing [Nail Clubbing] : no clubbing of the fingernails [Cyanosis, Localized] : no localized cyanosis [Petechial Hemorrhages (___cm)] : no petechial hemorrhages [Skin Color & Pigmentation] : normal skin color and pigmentation [] : no rash [No Venous Stasis] : no venous stasis [Skin Lesions] : no skin lesions [No Skin Ulcers] : no skin ulcer [No Xanthoma] : no  xanthoma was observed [Sensation] : the sensory exam was normal to light touch and pinprick [Deep Tendon Reflexes (DTR)] : deep tendon reflexes were 2+ and symmetric [No Focal Deficits] : no focal deficits [Oriented To Time, Place, And Person] : oriented to person, place, and time [Impaired Insight] : insight and judgment were intact [Affect] : the affect was normal [FreeTextEntry1] : non icteric

## 2024-08-01 NOTE — DISCUSSION/SUMMARY
[FreeTextEntry1] : OAD mild  Asthma LEONARD with significant improvement of CPAP usage- addressed risks /benefits- discussed  benefits re  new RESMED Mild asthma need daily compliance with use Symbicort and rinse sinus disease with active sxs Off Spiriva    Hypertension better controlled Proteinuria with history mild renal insufficiency management PMD Heme  noted continue singulair 10 mg QD Symbicort  and prn Proair as noted LEONARD- AUTO CPAP  6 - 18 cm H20 RESMED  Proair before swimming 2 puffs Notify of any wheezing.  Notify if rescue inhaler is needed greater than 2-3 times in any week.  Avoid known triggers. Anemia w/u and noted stool negative heme renal ADDED  spiriva 2.5 mcg 2 puffs QD- HOLD  Recommendation patient to consider bariatric surgery- did  not proceed Do believe her weight contributes to her sensation of shortness of breath Referral Cyrus Valiente MD- on hold  f/u mammogram per PMD management  Colonoscopy- completed with Dr Birch NOTED GI Dr Garza EGD f/u RENAl for proteinuria- Med f/u cardiology ECHO  4/14/21 noted  Patient compliant with CPAP therapy.  Continue present settings.  Patient with demonstrated clinical benefit with daytime and nocturnal symptomatology improvement.  DEXA  up to date with GYN Note has PCP for  overall  management and  will  defer other  medical  issues  to  her  care CPAP management visit  COVID BiValent booster up to  date  Sinus disease  medrol + Ceftin

## 2024-08-01 NOTE — PROCEDURE
[FreeTextEntry1] : MADIE 8/1/24 flow rates WNL very Mild OAD stable  MADIE 6/24/24  flow  rates WNL  Very mild  obstructive pattern  No BD at FEV1  Pos  BD at  smal airways  Dike 5/13/24  Flow  rates WNL  MADIE 4/1/24  very mild OAD  pos asx decline CPAP mariely compliance thru 6/23/24  Usage 97  %  Hours > 6  AUTO SET CPAP 6-18 cm H20  AHI 1.0 pos  goal  CPAP data compliance through March 31, 2024 Usage 93% CPAP 6 to 18 cm H2O AHI 1.3 Positive goal  Spirometry February 28, 2024 Flow rates Ratio is 77 No bronchodilator response in FEV1  Chest x-ray PA lateral February 28, 2024 Cardiac size is normal Clear lung fields No parenchymal infiltrate pleural effusion or dominant pulmonary nodules Soft tissue bony structures unremarkable There is a tiny right lower lung zone faint nodule that is longstanding chronic dating back to x-rays of 2018 confirmed with    MADIE  11/27/23  Mild OAD asx  PFT 10/16/23 minimal OAD lung volumes nl  DLCO 92 % HGB 12.1  MADIE  9/1/23  minimal OAD  ratio 76  CPAP data compliance adherence through August 31, 2023 Usage 93% Hours average greater than 7 AutoSet CPAP 6 to 18 cm H2O AHI 1.3 Continue current management  MADIE 5/22/23  Flow rates nl  ratio 77 nl stable pulmonary physiology  Spirometry no bronchodilator February 27, 2023 Flow rates are normal Ratio 76 Interval improvement of flow rates compared to data of November 21, 2022  Chest x-ray PA lateral 2/27/23 Normal cardiac size Lung fields are clear Impression infiltrates pleural effusions or dominant pulmonary nodules There is a tiny right lower lobe sub-6 cm well-circumscribed nodule that has been stable dating back to at least March 2019 was consistent with a postinflammatory nodule/granuloma  PFT 11/2/122 Flow rates nl  Mild OAD Lung volumes nl  DLCO 93 % HGB 10.8  Dike 10/10/22 flow rates nl  range  stable  DEXA 10/10/22  Left femoral neck nl Total Left hip nl AP L1- L4 nl  normal study f/u 2 - 5 years  CPAP data through October 9, 2022 87% AutoSet CPAP 6 to 15 cm H2O Greater than 7 AHI 3.8  Madie 8/25/22 flow rates nl stable FEV1  CPAP data compliance 8/24/22 97 % hours >7  AUTO CPAP 6-18 cm H20 AHI 2.5 pod occ airleak  CPAP_ RESMED 6/23/22 pos  airleak  PFT 5/13/22 Flow rates nl Lung Volumes nl DLCO 109 % HGB 11.2  Sleep study February 25 February 27, 2022 Severe obstructive sleep apnea positional component AHI 33 Follow-up post treatment with overnight oximetry    PFT 2/28/22 Very mild OAD TLC 86 %  normal lung volumes ERV 25 % sec overweight  DLCO  88 % HGB 11.9  Chest x-ray PA lateral February 18, 2022 Normal cardiac size Left midlung zone intrapulmonary lymph node versus end on vascular Not exclude tiny faint subcentimeter density right lower lobe No parenchymal infiltrates No pneumothorax No pleural effusion Roxie mediastinum unremarkable Soft tissue bony structures unremarkable Comparison to January 29, 2021 chest x-ray no interval change Right lower lobe most consistent with a subcentimeter granuloma  PFT 11/12/21 Normal flow rates  lung volumes nl  DLCO 86 % pred WNL HGB 11.1  PFT 8/20/21 flow rates nl  mild OAD TLC 94 RES IS INC SP CONDUCTANCE DEC DLCO 88 % interval improvement of flow rates  PFT 5/12/21 mILD oad lUNG vOLUMES NL DLCO 80 % wnl hgb 11.9  PFT 2/22/21 Normal  flow rates  minimal OAD Normal  lung volumes borderline airtrapping  Normal DLCO  84 %  pred  HGB 11.7  Chest x-ray PA lateral January 29, 2021 Normal cardiac size Clear lungs No parenchymal infiltrates pleural effusions with dominant pulmonary nodules left well-circumscribed intrapulmonary lymph node Tiny nodularity noted right lower lung zone most consistent with granuloma dating back  2 years  PFT Nov 23 2020  Mild OAD normal lung volumes Normal DLCO  HGB 11.4  Interval improvement at Flow Rates   NIOX 69 PPD October 22, 2020 PFT October 22, 2020 Mild reduction flow rates with a mild obstructive ventilatory impairment No significant response to bronchodilator at the FEV1 measuring 7% Borderline response at the small airways of 15% Lung volumes demonstrate normal total lung capacity Air trapping with RV/TLC ratio 38% predicted and a decreased ERV likely secondary to patient increased abdominal girth Diffusion normal 100% predicted. Hemoglobin 11.4 Data compared to August 11, 2020 does demonstrate some mild reduction at the flow rates  DEXA 9/17/20 normal study  8/11/2020 NIOX 50 PFT  spirometry normal 30% response to bronchodilator at the FEV1 Normal lung volumes Diffusion normal 92% predicted. Positive bronchodilator response  Chest x-ray PA lateral March 9, 2020 Normal cardiac size Clear lung fields without parenchymal infiltrates pleural effusions dominant pulmonary nodules Tissue bony structures normal Roxie mediastinum normal Impression clear lungs   CPAP DATA data    3/14/22 usage 93 % Hours > 6 Auto CPAp 6 - 18 cm h20 AHI 1.9  PFIZER completed vaccine  Echocardiogram August 14, 2021 Mild concentric LVH Impaired LV relaxation with normal filling pressure Myxomatous mitral valve Mild to moderate mitral regurgitation Mild tricuspid regurgitation Normal global LV systolic function Stress test treadmill August 14, 2021 No reported ischemic changes No arrhythmia noted  Flu vaccination intramuscular October 16 2023

## 2024-08-02 ENCOUNTER — APPOINTMENT (OUTPATIENT)
Dept: CARDIOLOGY | Facility: CLINIC | Age: 59
End: 2024-08-02
Payer: COMMERCIAL

## 2024-08-02 VITALS
HEART RATE: 75 BPM | DIASTOLIC BLOOD PRESSURE: 76 MMHG | SYSTOLIC BLOOD PRESSURE: 126 MMHG | WEIGHT: 236.19 LBS | RESPIRATION RATE: 18 BRPM | BODY MASS INDEX: 34.98 KG/M2 | TEMPERATURE: 98.5 F | OXYGEN SATURATION: 99 % | HEIGHT: 69 IN

## 2024-08-02 DIAGNOSIS — Z13.228 ENCOUNTER FOR SCREENING FOR OTHER METABOLIC DISORDERS: ICD-10-CM

## 2024-08-02 DIAGNOSIS — E55.9 VITAMIN D DEFICIENCY, UNSPECIFIED: ICD-10-CM

## 2024-08-02 DIAGNOSIS — Z12.11 ENCOUNTER FOR SCREENING FOR MALIGNANT NEOPLASM OF COLON: ICD-10-CM

## 2024-08-02 DIAGNOSIS — E78.00 PURE HYPERCHOLESTEROLEMIA, UNSPECIFIED: ICD-10-CM

## 2024-08-02 DIAGNOSIS — M54.9 DORSALGIA, UNSPECIFIED: ICD-10-CM

## 2024-08-02 DIAGNOSIS — R20.2 ANESTHESIA OF SKIN: ICD-10-CM

## 2024-08-02 DIAGNOSIS — R20.0 ANESTHESIA OF SKIN: ICD-10-CM

## 2024-08-02 DIAGNOSIS — D22.9 MELANOCYTIC NEVI, UNSPECIFIED: ICD-10-CM

## 2024-08-02 DIAGNOSIS — G89.29 HEADACHE, UNSPECIFIED: ICD-10-CM

## 2024-08-02 DIAGNOSIS — E66.01 MORBID (SEVERE) OBESITY DUE TO EXCESS CALORIES: ICD-10-CM

## 2024-08-02 DIAGNOSIS — R06.09 OTHER FORMS OF DYSPNEA: ICD-10-CM

## 2024-08-02 DIAGNOSIS — J45.909 UNSPECIFIED ASTHMA, UNCOMPLICATED: ICD-10-CM

## 2024-08-02 DIAGNOSIS — R51.9 HEADACHE, UNSPECIFIED: ICD-10-CM

## 2024-08-02 DIAGNOSIS — I34.0 NONRHEUMATIC MITRAL (VALVE) INSUFFICIENCY: ICD-10-CM

## 2024-08-02 DIAGNOSIS — I10 ESSENTIAL (PRIMARY) HYPERTENSION: ICD-10-CM

## 2024-08-02 DIAGNOSIS — N18.9 CHRONIC KIDNEY DISEASE, UNSPECIFIED: ICD-10-CM

## 2024-08-02 DIAGNOSIS — D64.9 ANEMIA, UNSPECIFIED: ICD-10-CM

## 2024-08-02 DIAGNOSIS — G47.33 OBSTRUCTIVE SLEEP APNEA (ADULT) (PEDIATRIC): ICD-10-CM

## 2024-08-02 PROCEDURE — 93000 ELECTROCARDIOGRAM COMPLETE: CPT

## 2024-08-02 PROCEDURE — G2211 COMPLEX E/M VISIT ADD ON: CPT | Mod: NC

## 2024-08-02 PROCEDURE — 99214 OFFICE O/P EST MOD 30 MIN: CPT | Mod: 25

## 2024-08-02 NOTE — HISTORY OF PRESENT ILLNESS
[FreeTextEntry1] : This is a 60 y/o female with a pmhx of  HTN, Asthma, Anemia, LEONARD, and Chronic Back pain presents today for follow up. Patient with chronic back pain, follows with Dr. Rivero, doing PT. She reports burning to left foot, requesting podiatry referral. She continues to report headaches, MR head 3/2024 with no acute findings. She takes tylenol and Ubrelvy.  She reports dyspnea is stable pt with rare fatigue  Patient denies chest pain, palpitations, dizziness, syncope, changes in bowel/bladder habits or appetite.

## 2024-08-02 NOTE — PHYSICAL EXAM
[Well Developed] : well developed [Well Nourished] : well nourished [No Acute Distress] : no acute distress [Normal Conjunctiva] : normal conjunctiva [Normal Venous Pressure] : normal venous pressure [No Carotid Bruit] : no carotid bruit [Normal S1, S2] : normal S1, S2 [No Murmur] : no murmur [No Rub] : no rub [No Gallop] : no gallop [Clear Lung Fields] : clear lung fields [Good Air Entry] : good air entry [No Respiratory Distress] : no respiratory distress  [Non Tender] : non-tender [Soft] : abdomen soft [No Masses/organomegaly] : no masses/organomegaly [Normal Bowel Sounds] : normal bowel sounds [Normal Gait] : normal gait [No Edema] : no edema [No Cyanosis] : no cyanosis [No Clubbing] : no clubbing [No Varicosities] : no varicosities [No Rash] : no rash [No Skin Lesions] : no skin lesions [Moves all extremities] : moves all extremities [No Focal Deficits] : no focal deficits [Normal Speech] : normal speech [Alert and Oriented] : alert and oriented [Normal memory] : normal memory [de-identified] : skin tags

## 2024-08-02 NOTE — REVIEW OF SYSTEMS
[Negative] : Heme/Lymph [Dyspnea on exertion] : dyspnea during exertion [Tingling (Paresthesia)] : tingling [Feeling Fatigued] : feeling fatigued [SOB] : no shortness of breath [Chest Discomfort] : no chest discomfort [Lower Ext Edema] : no extremity edema [Leg Claudication] : no intermittent leg claudication [Palpitations] : no palpitations [Orthopnea] : no orthopnea [PND] : no PND [Syncope] : no syncope [Dizziness] : no dizziness [Tremor] : no tremor was seen [Numbness (Hypoesthesia)] : no numbness [Convulsions] : no convulsions [Weakness] : no weakness [Limb Weakness (Paresis)] : no limb weakness (Paresis) [Speech Disturbance] : no speech disturbance

## 2024-08-08 LAB
25(OH)D3 SERPL-MCNC: 18.1 NG/ML
ALBUMIN SERPL ELPH-MCNC: 4.3 G/DL
ALP BLD-CCNC: 88 U/L
ALT SERPL-CCNC: 14 U/L
ANION GAP SERPL CALC-SCNC: 14 MMOL/L
APPEARANCE: ABNORMAL
AST SERPL-CCNC: 16 U/L
BACTERIA: ABNORMAL /HPF
BASOPHILS # BLD AUTO: 0.03 K/UL
BASOPHILS NFR BLD AUTO: 0.6 %
BILIRUB DIRECT SERPL-MCNC: 0.2 MG/DL
BILIRUB INDIRECT SERPL-MCNC: 0.3 MG/DL
BILIRUB SERPL-MCNC: 0.5 MG/DL
BILIRUBIN URINE: NEGATIVE
BLOOD URINE: NEGATIVE
BUN SERPL-MCNC: 12 MG/DL
CALCIUM SERPL-MCNC: 10.2 MG/DL
CAST: 5 /LPF
CHLORIDE SERPL-SCNC: 106 MMOL/L
CHOLEST SERPL-MCNC: 192 MG/DL
CO2 SERPL-SCNC: 22 MMOL/L
COLOR: NORMAL
CREAT SERPL-MCNC: 0.92 MG/DL
EGFR: 72 ML/MIN/1.73M2
EOSINOPHIL # BLD AUTO: 0.11 K/UL
EOSINOPHIL NFR BLD AUTO: 2.4 %
EPITHELIAL CELLS: 14 /HPF
ESTIMATED AVERAGE GLUCOSE: 103 MG/DL
FERRITIN SERPL-MCNC: 223 NG/ML
FOLATE SERPL-MCNC: 14.1 NG/ML
GLUCOSE QUALITATIVE U: NEGATIVE MG/DL
GLUCOSE SERPL-MCNC: 94 MG/DL
HBA1C MFR BLD HPLC: 5.2 %
HCT VFR BLD CALC: 37.6 %
HDLC SERPL-MCNC: 80 MG/DL
HGB BLD-MCNC: 11.4 G/DL
IMM GRANULOCYTES NFR BLD AUTO: 0.2 %
IRON SATN MFR SERPL: 38 %
IRON SERPL-MCNC: 121 UG/DL
KETONES URINE: NEGATIVE MG/DL
LDLC SERPL CALC-MCNC: 96 MG/DL
LEUKOCYTE ESTERASE URINE: ABNORMAL
LYMPHOCYTES # BLD AUTO: 1.94 K/UL
LYMPHOCYTES NFR BLD AUTO: 41.8 %
MAGNESIUM SERPL-MCNC: 1.9 MG/DL
MAN DIFF?: NORMAL
MCHC RBC-ENTMCNC: 28.5 PG
MCHC RBC-ENTMCNC: 30.3 GM/DL
MCV RBC AUTO: 94 FL
MICROSCOPIC-UA: NORMAL
MONOCYTES # BLD AUTO: 0.5 K/UL
MONOCYTES NFR BLD AUTO: 10.8 %
NEUTROPHILS # BLD AUTO: 2.05 K/UL
NEUTROPHILS NFR BLD AUTO: 44.2 %
NITRITE URINE: NEGATIVE
NONHDLC SERPL-MCNC: 112 MG/DL
PH URINE: 5.5
PLATELET # BLD AUTO: 244 K/UL
POTASSIUM SERPL-SCNC: 5.1 MMOL/L
PROT SERPL-MCNC: 7.4 G/DL
PROTEIN URINE: 30 MG/DL
RBC # BLD: 4 M/UL
RBC # FLD: 12.9 %
RED BLOOD CELLS URINE: 0 /HPF
REVIEW: NORMAL
SODIUM SERPL-SCNC: 143 MMOL/L
SPECIFIC GRAVITY URINE: >1.03
T4 FREE SERPL-MCNC: 1 NG/DL
TIBC SERPL-MCNC: 315 UG/DL
TRIGL SERPL-MCNC: 93 MG/DL
TSH SERPL-ACNC: 0.95 UIU/ML
UIBC SERPL-MCNC: 194 UG/DL
UROBILINOGEN URINE: 1 MG/DL
VIT B12 SERPL-MCNC: 392 PG/ML
WBC # FLD AUTO: 4.64 K/UL
WHITE BLOOD CELLS URINE: 14 /HPF
YEAST-LIKE CELLS: PRESENT

## 2024-09-11 ENCOUNTER — APPOINTMENT (OUTPATIENT)
Dept: PODIATRY | Facility: CLINIC | Age: 59
End: 2024-09-11

## 2024-09-11 DIAGNOSIS — M72.2 PLANTAR FASCIAL FIBROMATOSIS: ICD-10-CM

## 2024-09-11 DIAGNOSIS — M79.672 PAIN IN LEFT FOOT: ICD-10-CM

## 2024-09-11 DIAGNOSIS — G47.30 SLEEP APNEA, UNSPECIFIED: ICD-10-CM

## 2024-09-11 PROCEDURE — 20550 NJX 1 TENDON SHEATH/LIGAMENT: CPT | Mod: LT

## 2024-09-11 PROCEDURE — 99203 OFFICE O/P NEW LOW 30 MIN: CPT | Mod: 25

## 2024-09-11 RX ORDER — MELOXICAM 15 MG/1
15 TABLET ORAL DAILY
Qty: 14 | Refills: 0 | Status: ACTIVE | COMMUNITY
Start: 2024-09-11 | End: 1900-01-01

## 2024-09-12 ENCOUNTER — APPOINTMENT (OUTPATIENT)
Dept: PULMONOLOGY | Facility: CLINIC | Age: 59
End: 2024-09-12
Payer: COMMERCIAL

## 2024-09-12 ENCOUNTER — APPOINTMENT (OUTPATIENT)
Dept: CARDIOLOGY | Facility: CLINIC | Age: 59
End: 2024-09-12

## 2024-09-12 VITALS
OXYGEN SATURATION: 99 % | HEART RATE: 72 BPM | DIASTOLIC BLOOD PRESSURE: 90 MMHG | BODY MASS INDEX: 35.25 KG/M2 | WEIGHT: 238 LBS | HEIGHT: 69 IN | SYSTOLIC BLOOD PRESSURE: 146 MMHG

## 2024-09-12 VITALS
HEART RATE: 69 BPM | OXYGEN SATURATION: 99 % | DIASTOLIC BLOOD PRESSURE: 85 MMHG | SYSTOLIC BLOOD PRESSURE: 142 MMHG | RESPIRATION RATE: 16 BRPM

## 2024-09-12 DIAGNOSIS — I34.0 NONRHEUMATIC MITRAL (VALVE) INSUFFICIENCY: ICD-10-CM

## 2024-09-12 DIAGNOSIS — J45.909 UNSPECIFIED ASTHMA, UNCOMPLICATED: ICD-10-CM

## 2024-09-12 DIAGNOSIS — G47.33 OBSTRUCTIVE SLEEP APNEA (ADULT) (PEDIATRIC): ICD-10-CM

## 2024-09-12 DIAGNOSIS — R09.82 POSTNASAL DRIP: ICD-10-CM

## 2024-09-12 DIAGNOSIS — E66.01 MORBID (SEVERE) OBESITY DUE TO EXCESS CALORIES: ICD-10-CM

## 2024-09-12 LAB
FLUAV SPEC QL CULT: NEGATIVE
FLUBV AG SPEC QL IA: NEGATIVE
S PYO AG SPEC QL IA: NEGATIVE
SARS-COV-2 AG RESP QL IA.RAPID: NEGATIVE

## 2024-09-12 PROCEDURE — 99214 OFFICE O/P EST MOD 30 MIN: CPT | Mod: 25

## 2024-09-12 PROCEDURE — 87811 SARS-COV-2 COVID19 W/OPTIC: CPT | Mod: QW

## 2024-09-12 PROCEDURE — G2211 COMPLEX E/M VISIT ADD ON: CPT | Mod: NC

## 2024-09-12 PROCEDURE — 87880 STREP A ASSAY W/OPTIC: CPT | Mod: QW

## 2024-09-12 PROCEDURE — 99402 PREV MED CNSL INDIV APPRX 30: CPT

## 2024-09-12 PROCEDURE — 99214 OFFICE O/P EST MOD 30 MIN: CPT

## 2024-09-12 PROCEDURE — 87804 INFLUENZA ASSAY W/OPTIC: CPT | Mod: QW

## 2024-09-12 RX ORDER — TIRZEPATIDE 2.5 MG/.5ML
2.5 INJECTION, SOLUTION SUBCUTANEOUS
Qty: 1 | Refills: 1 | Status: ACTIVE | COMMUNITY
Start: 2024-09-12 | End: 1900-01-01

## 2024-09-12 NOTE — PROCEDURE
[FreeTextEntry1] : MADIE 8/1/24 flow rates WNL very Mild OAD stable  MADIE 6/24/24  flow  rates WNL  Very mild  obstructive pattern  No BD at FEV1  Pos  BD at  smal airways  [place] 5/13/24  Flow  rates WNL  MADIE 4/1/24  very mild OAD  pos asx decline CPAP mariely compliance thru  9/10/24   Usage 97  %  Hours > 6  AUTO SET CPAP 6-18 cm H20  AHI 0.8 pos  goal  CPAP data compliance through March 31, 2024 Usage 93% CPAP 6 to 18 cm H2O AHI 1.3 Positive goal  Spirometry February 28, 2024 Flow rates Ratio is 77 No bronchodilator response in FEV1  Chest x-ray PA lateral February 28, 2024 Cardiac size is normal Clear lung fields No parenchymal infiltrate pleural effusion or dominant pulmonary nodules Soft tissue bony structures unremarkable There is a tiny right lower lung zone faint nodule that is longstanding chronic dating back to x-rays of 2018 confirmed with    MADIE  11/27/23  Mild OAD asx  PFT 10/16/23 minimal OAD lung volumes nl  DLCO 92 % HGB 12.1  MADIE  9/1/23  minimal OAD  ratio 76  CPAP data compliance adherence through August 31, 2023 Usage 93% Hours average greater than 7 AutoSet CPAP 6 to 18 cm H2O AHI 1.3 Continue current management  MADIE 5/22/23  Flow rates nl  ratio 77 nl stable pulmonary physiology  Spirometry no bronchodilator February 27, 2023 Flow rates are normal Ratio 76 Interval improvement of flow rates compared to data of November 21, 2022  Chest x-ray PA lateral 2/27/23 Normal cardiac size Lung fields are clear Impression infiltrates pleural effusions or dominant pulmonary nodules There is a tiny right lower lobe sub-6 cm well-circumscribed nodule that has been stable dating back to at least March 2019 was consistent with a postinflammatory nodule/granuloma  PFT 11/2/122 Flow rates nl  Mild OAD Lung volumes nl  DLCO 93 % HGB 10.8  [place] 10/10/22 flow rates nl  range  stable  DEXA 10/10/22  Left femoral neck nl Total Left hip nl AP L1- L4 nl  normal study f/u 2 - 5 years  CPAP data through October 9, 2022 87% AutoSet CPAP 6 to 15 cm H2O Greater than 7 AHI 3.8  [place] 8/25/22 flow rates nl stable FEV1  CPAP data compliance 8/24/22 97 % hours >7  AUTO CPAP 6-18 cm H20 AHI 2.5 pod occ airleak  CPAP_ RESMED 6/23/22 pos  airleak  PFT 5/13/22 Flow rates nl Lung Volumes nl DLCO 109 % HGB 11.2  Sleep study February 25 February 27, 2022 Severe obstructive sleep apnea positional component AHI 33 Follow-up post treatment with overnight oximetry    PFT 2/28/22 Very mild OAD TLC 86 %  normal lung volumes ERV 25 % sec overweight  DLCO  88 % HGB 11.9  Chest x-ray PA lateral February 18, 2022 Normal cardiac size Left midlung zone intrapulmonary lymph node versus end on vascular Not exclude tiny faint subcentimeter density right lower lobe No parenchymal infiltrates No pneumothorax No pleural effusion Roxie mediastinum unremarkable Soft tissue bony structures unremarkable Comparison to January 29, 2021 chest x-ray no interval change Right lower lobe most consistent with a subcentimeter granuloma  PFT 11/12/21 Normal flow rates  lung volumes nl  DLCO 86 % pred WNL HGB 11.1  PFT 8/20/21 flow rates nl  mild OAD TLC 94 RES IS INC SP CONDUCTANCE DEC DLCO 88 % interval improvement of flow rates  PFT 5/12/21 mILD oad lUNG vOLUMES NL DLCO 80 % wnl hgb 11.9  PFT 2/22/21 Normal  flow rates  minimal OAD Normal  lung volumes borderline airtrapping  Normal DLCO  84 %  pred  HGB 11.7  Chest x-ray PA lateral January 29, 2021 Normal cardiac size Clear lungs No parenchymal infiltrates pleural effusions with dominant pulmonary nodules left well-circumscribed intrapulmonary lymph node Tiny nodularity noted right lower lung zone most consistent with granuloma dating back  2 years  PFT Nov 23 2020  Mild OAD normal lung volumes Normal DLCO  HGB 11.4  Interval improvement at Flow Rates   NIOX 69 PPD October 22, 2020 PFT October 22, 2020 Mild reduction flow rates with a mild obstructive ventilatory impairment No significant response to bronchodilator at the FEV1 measuring 7% Borderline response at the small airways of 15% Lung volumes demonstrate normal total lung capacity Air trapping with RV/TLC ratio 38% predicted and a decreased ERV likely secondary to patient increased abdominal girth Diffusion normal 100% predicted. Hemoglobin 11.4 Data compared to August 11, 2020 does demonstrate some mild reduction at the flow rates  DEXA 9/17/20 normal study  8/11/2020 NIOX 50 PFT  spirometry normal 30% response to bronchodilator at the FEV1 Normal lung volumes Diffusion normal 92% predicted. Positive bronchodilator response  Chest x-ray PA lateral March 9, 2020 Normal cardiac size Clear lung fields without parenchymal infiltrates pleural effusions dominant pulmonary nodules Tissue bony structures normal Roxie mediastinum normal Impression clear lungs   CPAP DATA data    3/14/22 usage 93 % Hours > 6 Auto CPAp 6 - 18 cm h20 AHI 1.9  PFIZER completed vaccine  Echocardiogram August 14, 2021 Mild concentric LVH Impaired LV relaxation with normal filling pressure Myxomatous mitral valve Mild to moderate mitral regurgitation Mild tricuspid regurgitation Normal global LV systolic function Stress test treadmill August 14, 2021 No reported ischemic changes No arrhythmia noted  Flu vaccination intramuscular October 16 2023

## 2024-09-12 NOTE — DISCUSSION/SUMMARY
[FreeTextEntry1] : OAD mild  Asthma Mild symptoms suggestive of a flulike illness-rapid COVID flu strep September 12 negative awaiting PCR LEONARD with significant improvement of CPAP usage- addressed risks /benefits- discussed  benefits re  new RESMED Mild asthma need daily compliance with use Symbicort and rinse sinus disease with active sxs Off Spiriva    Hypertension better controlled Proteinuria with history mild renal insufficiency management PMD Heme  noted continue singulair 10 mg QD Symbicort  and prn Proair as noted LEONARD- AUTO CPAP  6 - 18 cm H20 RESMED  Proair before swimming 2 puffs Notify of any wheezing.  Notify if rescue inhaler is needed greater than 2-3 times in any week.  Avoid known triggers. Anemia w/u and noted stool negative heme renal ADDED  spiriva 2.5 mcg 2 puffs QD- HOLD  Recommendation patient to consider bariatric surgery- did  not proceed Do believe her weight contributes to her sensation of shortness of breath Referral Cyrus Valiente MD- on hold  f/u mammogram per PMD management  Colonoscopy- completed with Dr Birch NOTED GI Dr Garza EGD f/u RENAl for proteinuria- Med f/u cardiology ECHO  4/14/21 noted  Patient compliant with CPAP therapy.  Continue present settings.  Patient with demonstrated clinical benefit with daytime and nocturnal symptomatology improvement.  DEXA  up to date with GYN Note has PCP for  overall  management and  will  defer other  medical  issues  to  her  care CPAP management visit  COVID BiValent booster up to  date  Sinus disease  medrol + Ceftin

## 2024-09-12 NOTE — HISTORY OF PRESENT ILLNESS
[None] : ~He/She~ has no significant interval events [Stable] : are stable [Difficulty Breathing During Exertion] : stable dyspnea on exertion [Feelings Of Weakness On Exertion] : stable exercise intolerance [Cough] : denies coughing [Wheezing] : denies wheezing [Chest Pain Or Discomfort] : denies chest pain [Regional Soft Tissue Swelling Both Lower Extremities] : denies lower extremity edema [Fever] : denies fever [Obstructive Sleep Apnea] : obstructive sleep apnea [Date: ___] : Date of most recent diagnostic polysomnogram: [unfilled] [AHI: ___ per hour] : Apnea-hypopnea index:  [unfilled] per hour [Wt Gain ___ Lbs] : no recent weight gain [Wt Loss ___ Lbs] : no recent weight loss [Oxygen] : the patient uses no supplemental oxygen [Nocturnal Oxygen] : The patient does not use nocturnal oxygen [FreeTextEntry1] : HA mild  sore throat no fever  sinus sxs chronic more active nasal sinus  congestion with use Astelin some chest congestion completed prior steroid + ceftin occ use ERNESTO 3- 4xs per  week not using symbicort thought it was equivalent to the singulair  ? allergy component better controlled with Rx nasal + generic singulair HEME 56yo F with PMH of asthma, morbid obesity, sleep apnea on CPAP, HTN, proteinuria. Patient was referred for evaluation and management of anemia.  Laboratory studies CBC 6/24/19 : WBC 4.4, Hb 11.7, RBC 3.87, MCV 92.5, Hct 35.9%, RDW 11.9%, PLTs 257.  6/24/19 iron studies were normal Ferritin 230 Vitamin B 12 429 Folate 16 Light chain kappa 2.89 mildly elevated but kappa/ lambda ratio were normal.  Immunofixation was negative for monoclonal ban  no active asthma sxs no inc use ERNESTO  issue with CPAP use  does use So Clean but was not compliant - advised not to use no viral  no travel non sp  lumbar back pain Notes some component  left hip  pain with numbness  radiation left HEME Noted GI noted

## 2024-09-12 NOTE — REVIEW OF SYSTEMS
[Sinus Problems] : sinus problems [As Noted in HPI] : as noted in HPI [Hypertension] : ~T hypertension [Back Pain] : ~T back pain [Anemia] : anemia [Negative] : Pulmonary Hypertension [Ear Disturbance] : no ear disturbance [Nasal Congestion] : no nasal congestion [Epistaxis] : no nosebleeds [Postnasal Drip] : no postnasal drip [Sore Throat] : no sore throat [Dry Mouth] : no dry mouth [PND] : no PND [Orthopnea] : no orthopnea [Palpitations] : no palpitations [Edema] : ~T edema was not present [Claudication] : no intermittent claudication [Leg Cramps] : no leg cramps [FreeTextEntry5] : colonoscopy 2016 [FreeTextEntry6] : management Dr Abraham Escalanet

## 2024-09-13 ENCOUNTER — NON-APPOINTMENT (OUTPATIENT)
Age: 59
End: 2024-09-13

## 2024-09-13 DIAGNOSIS — U07.1 COVID-19: ICD-10-CM

## 2024-09-13 PROBLEM — M72.2 PLANTAR FASCIITIS: Status: ACTIVE | Noted: 2024-09-11

## 2024-09-13 LAB
INFLUENZA A RESULT: NOT DETECTED
INFLUENZA B RESULT: NOT DETECTED
RESP SYN VIRUS RESULT: NOT DETECTED
SARS-COV-2 RESULT: DETECTED

## 2024-09-13 RX ORDER — NIRMATRELVIR AND RITONAVIR 300-100 MG
20 X 150 MG & KIT ORAL
Qty: 30 | Refills: 0 | Status: ACTIVE | COMMUNITY
Start: 2024-09-13 | End: 1900-01-01

## 2024-09-13 NOTE — HISTORY OF PRESENT ILLNESS
[FreeTextEntry1] : Patient presents today with a flatfoot. She has left side chronic inflamed plantar fasciitis that has been going on for a year. It is not getting any better. She had an injection months ago that gave temporary relief. She bought orthotics and it still bothers her badly and interferes with her lifestyle. Her pain level is 6/10. She has post-static dyskinesia. She went for nerve testing, which was normal. She presents for evaluation and treatment. She takes Gabapentin, which is not really giving relief.

## 2024-09-13 NOTE — ASSESSMENT
[FreeTextEntry1] : Impression: Plantar fasciitis, left (M72.2). Pain (M79.672).  Treatment: The patient consented. Patient was placed in the treatment chair in supine position. After obtaining verbal consent time out was performed to the identified area of maximal tenderness. The foot was prepped utilizing 70% alcohol solution. After utilizing Ethyl Chloride, a local injection consisting of 1 1/2cc's of a combination of Xylocaine and Kenalog-40 was infiltrated at the site of maximum tenderness over the plantar medial aspect of the left heel with immediate relief noted upon weight bearing. Injection site was covered with band-aid. I put a strapping on. I gave her some home therapy and stretching exercises. She has orthotics she can continue to use.  I want her to ice and get Hoka Bondi-8 sneakers. I put her on Meloxicam once a day for 10 days with food only.  I will see her back in the office only with continued pain that persists after 2 months.  Patient was advised on the potential of a steroid flare following an injection, advised to apply ice to the area. Patient was instructed on the potential of a fascial rupture, advised to avoid high impact physical activities.

## 2024-09-13 NOTE — PHYSICAL EXAM
[2+] : left foot dorsalis pedis 2+ [Skin Color & Pigmentation] : normal skin color and pigmentation [Skin Turgor] : normal skin turgor [Skin Lesions] : no skin lesions [Sensation] : the sensory exam was normal to light touch and pinprick [No Focal Deficits] : no focal deficits [Deep Tendon Reflexes (DTR)] : deep tendon reflexes were 2+ and symmetric [Motor Exam] : the motor exam was normal [Ankle Swelling (On Exam)] : not present [Varicose Veins Of Lower Extremities] : not present [] : not present [Delayed in the Right Toes] : capillary refills normal in right toes [Delayed in the Left Toes] : capillary refills normal in the left toes [FreeTextEntry3] : Hair growth noted on digits. Proximal to distal cooling is within normal limits.  [de-identified] : She has fully compensated forefoot varus with flexible flatfoot. She has painful plantar fasciitis at the proximal one third of the extent of the plantar fascia at the medial insertion. No pain over the tarsal tunnel with PT tendon. She had previous x-rays that were negative for fracture. [Foot Ulcer] : no foot ulcer [Skin Induration] : no skin induration

## 2024-09-13 NOTE — PHYSICAL EXAM
[2+] : left foot dorsalis pedis 2+ [Skin Color & Pigmentation] : normal skin color and pigmentation [Skin Turgor] : normal skin turgor [Skin Lesions] : no skin lesions [Sensation] : the sensory exam was normal to light touch and pinprick [No Focal Deficits] : no focal deficits [Deep Tendon Reflexes (DTR)] : deep tendon reflexes were 2+ and symmetric [Motor Exam] : the motor exam was normal [Ankle Swelling (On Exam)] : not present [Varicose Veins Of Lower Extremities] : not present [] : not present [Delayed in the Right Toes] : capillary refills normal in right toes [Delayed in the Left Toes] : capillary refills normal in the left toes [FreeTextEntry3] : Hair growth noted on digits. Proximal to distal cooling is within normal limits.  [de-identified] : She has fully compensated forefoot varus with flexible flatfoot. She has painful plantar fasciitis at the proximal one third of the extent of the plantar fascia at the medial insertion. No pain over the tarsal tunnel with PT tendon. She had previous x-rays that were negative for fracture. [Foot Ulcer] : no foot ulcer [Skin Induration] : no skin induration

## 2024-10-10 ENCOUNTER — APPOINTMENT (OUTPATIENT)
Dept: PULMONOLOGY | Facility: CLINIC | Age: 59
End: 2024-10-10
Payer: COMMERCIAL

## 2024-10-10 ENCOUNTER — APPOINTMENT (OUTPATIENT)
Dept: PULMONOLOGY | Facility: CLINIC | Age: 59
End: 2024-10-10

## 2024-10-10 DIAGNOSIS — U07.1 COVID-19: ICD-10-CM

## 2024-10-10 DIAGNOSIS — G47.30 SLEEP APNEA, UNSPECIFIED: ICD-10-CM

## 2024-10-10 DIAGNOSIS — J45.909 UNSPECIFIED ASTHMA, UNCOMPLICATED: ICD-10-CM

## 2024-10-10 PROCEDURE — 94727 GAS DIL/WSHOT DETER LNG VOL: CPT

## 2024-10-10 PROCEDURE — 94729 DIFFUSING CAPACITY: CPT

## 2024-10-10 PROCEDURE — G0008: CPT

## 2024-10-10 PROCEDURE — 99214 OFFICE O/P EST MOD 30 MIN: CPT | Mod: 25

## 2024-10-10 PROCEDURE — 90656 IIV3 VACC NO PRSV 0.5 ML IM: CPT

## 2024-10-10 PROCEDURE — 94060 EVALUATION OF WHEEZING: CPT

## 2024-11-12 ENCOUNTER — RX RENEWAL (OUTPATIENT)
Age: 59
End: 2024-11-12

## 2024-11-22 ENCOUNTER — APPOINTMENT (OUTPATIENT)
Dept: PULMONOLOGY | Facility: CLINIC | Age: 59
End: 2024-11-22
Payer: COMMERCIAL

## 2024-11-22 VITALS
OXYGEN SATURATION: 96 % | RESPIRATION RATE: 16 BRPM | HEART RATE: 77 BPM | DIASTOLIC BLOOD PRESSURE: 82 MMHG | SYSTOLIC BLOOD PRESSURE: 144 MMHG

## 2024-11-22 DIAGNOSIS — G47.30 SLEEP APNEA, UNSPECIFIED: ICD-10-CM

## 2024-11-22 DIAGNOSIS — J45.909 UNSPECIFIED ASTHMA, UNCOMPLICATED: ICD-10-CM

## 2024-11-22 PROCEDURE — 99214 OFFICE O/P EST MOD 30 MIN: CPT | Mod: 25

## 2024-11-22 PROCEDURE — 94060 EVALUATION OF WHEEZING: CPT

## 2024-11-22 RX ORDER — BUDESONIDE AND FORMOTEROL FUMARATE DIHYDRATE 160; 4.5 UG/1; UG/1
160-4.5 AEROSOL RESPIRATORY (INHALATION)
Qty: 1 | Refills: 3 | Status: ACTIVE | COMMUNITY
Start: 2024-11-22 | End: 1900-01-01

## 2024-12-16 ENCOUNTER — RX RENEWAL (OUTPATIENT)
Age: 59
End: 2024-12-16

## 2024-12-20 ENCOUNTER — APPOINTMENT (OUTPATIENT)
Dept: CARDIOLOGY | Facility: CLINIC | Age: 59
End: 2024-12-20
Payer: COMMERCIAL

## 2024-12-20 VITALS
WEIGHT: 241 LBS | HEIGHT: 69 IN | TEMPERATURE: 98.4 F | DIASTOLIC BLOOD PRESSURE: 80 MMHG | HEART RATE: 82 BPM | BODY MASS INDEX: 35.7 KG/M2 | OXYGEN SATURATION: 99 % | SYSTOLIC BLOOD PRESSURE: 120 MMHG | RESPIRATION RATE: 16 BRPM

## 2024-12-20 DIAGNOSIS — I10 ESSENTIAL (PRIMARY) HYPERTENSION: ICD-10-CM

## 2024-12-20 DIAGNOSIS — E78.00 PURE HYPERCHOLESTEROLEMIA, UNSPECIFIED: ICD-10-CM

## 2024-12-20 DIAGNOSIS — Z13.228 ENCOUNTER FOR SCREENING FOR OTHER METABOLIC DISORDERS: ICD-10-CM

## 2024-12-20 DIAGNOSIS — G47.33 OBSTRUCTIVE SLEEP APNEA (ADULT) (PEDIATRIC): ICD-10-CM

## 2024-12-20 DIAGNOSIS — R20.0 ANESTHESIA OF SKIN: ICD-10-CM

## 2024-12-20 DIAGNOSIS — R51.9 HEADACHE, UNSPECIFIED: ICD-10-CM

## 2024-12-20 DIAGNOSIS — R06.09 OTHER FORMS OF DYSPNEA: ICD-10-CM

## 2024-12-20 DIAGNOSIS — M54.9 DORSALGIA, UNSPECIFIED: ICD-10-CM

## 2024-12-20 DIAGNOSIS — D64.9 ANEMIA, UNSPECIFIED: ICD-10-CM

## 2024-12-20 DIAGNOSIS — N18.9 CHRONIC KIDNEY DISEASE, UNSPECIFIED: ICD-10-CM

## 2024-12-20 DIAGNOSIS — E66.01 MORBID (SEVERE) OBESITY DUE TO EXCESS CALORIES: ICD-10-CM

## 2024-12-20 DIAGNOSIS — J45.909 UNSPECIFIED ASTHMA, UNCOMPLICATED: ICD-10-CM

## 2024-12-20 DIAGNOSIS — R20.2 ANESTHESIA OF SKIN: ICD-10-CM

## 2024-12-20 DIAGNOSIS — G89.29 HEADACHE, UNSPECIFIED: ICD-10-CM

## 2024-12-20 DIAGNOSIS — N64.4 MASTODYNIA: ICD-10-CM

## 2024-12-20 DIAGNOSIS — E55.9 VITAMIN D DEFICIENCY, UNSPECIFIED: ICD-10-CM

## 2024-12-20 PROCEDURE — G2211 COMPLEX E/M VISIT ADD ON: CPT | Mod: NC

## 2024-12-20 PROCEDURE — 93000 ELECTROCARDIOGRAM COMPLETE: CPT

## 2024-12-20 PROCEDURE — 99214 OFFICE O/P EST MOD 30 MIN: CPT

## 2024-12-23 LAB
25(OH)D3 SERPL-MCNC: 15.9 NG/ML
ALBUMIN SERPL ELPH-MCNC: 4.5 G/DL
ALP BLD-CCNC: 89 U/L
ALT SERPL-CCNC: 13 U/L
ANION GAP SERPL CALC-SCNC: 12 MMOL/L
APPEARANCE: CLEAR
AST SERPL-CCNC: 16 U/L
BACTERIA: NEGATIVE /HPF
BASOPHILS # BLD AUTO: 0.04 K/UL
BASOPHILS NFR BLD AUTO: 0.8 %
BILIRUB DIRECT SERPL-MCNC: 0.1 MG/DL
BILIRUB INDIRECT SERPL-MCNC: 0.3 MG/DL
BILIRUB SERPL-MCNC: 0.4 MG/DL
BILIRUBIN URINE: NEGATIVE
BLOOD URINE: NEGATIVE
BUN SERPL-MCNC: 13 MG/DL
CALCIUM SERPL-MCNC: 9.9 MG/DL
CAST: 0 /LPF
CHLORIDE SERPL-SCNC: 103 MMOL/L
CHOLEST SERPL-MCNC: 199 MG/DL
CO2 SERPL-SCNC: 25 MMOL/L
COLOR: YELLOW
CREAT SERPL-MCNC: 0.92 MG/DL
EGFR: 72 ML/MIN/1.73M2
EOSINOPHIL # BLD AUTO: 0.13 K/UL
EOSINOPHIL NFR BLD AUTO: 2.5 %
EPITHELIAL CELLS: 7 /HPF
ESTIMATED AVERAGE GLUCOSE: 103 MG/DL
FERRITIN SERPL-MCNC: 249 NG/ML
FOLATE SERPL-MCNC: 12.2 NG/ML
GLUCOSE QUALITATIVE U: NEGATIVE MG/DL
GLUCOSE SERPL-MCNC: 90 MG/DL
HBA1C MFR BLD HPLC: 5.2 %
HCT VFR BLD CALC: 38.7 %
HDLC SERPL-MCNC: 76 MG/DL
HGB BLD-MCNC: 11.6 G/DL
IMM GRANULOCYTES NFR BLD AUTO: 0.2 %
IRON SATN MFR SERPL: 34 %
IRON SERPL-MCNC: 111 UG/DL
KETONES URINE: NEGATIVE MG/DL
LDLC SERPL CALC-MCNC: 109 MG/DL
LEUKOCYTE ESTERASE URINE: ABNORMAL
LYMPHOCYTES # BLD AUTO: 1.74 K/UL
LYMPHOCYTES NFR BLD AUTO: 33.1 %
MAGNESIUM SERPL-MCNC: 1.8 MG/DL
MAN DIFF?: NORMAL
MCHC RBC-ENTMCNC: 29.1 PG
MCHC RBC-ENTMCNC: 30 G/DL
MCV RBC AUTO: 97 FL
MICROSCOPIC-UA: NORMAL
MONOCYTES # BLD AUTO: 0.42 K/UL
MONOCYTES NFR BLD AUTO: 8 %
NEUTROPHILS # BLD AUTO: 2.91 K/UL
NEUTROPHILS NFR BLD AUTO: 55.4 %
NITRITE URINE: NEGATIVE
NONHDLC SERPL-MCNC: 123 MG/DL
PH URINE: 5.5
PLATELET # BLD AUTO: 272 K/UL
POTASSIUM SERPL-SCNC: 4.4 MMOL/L
PROT SERPL-MCNC: 7.4 G/DL
PROTEIN URINE: NORMAL MG/DL
RBC # BLD: 3.99 M/UL
RBC # FLD: 12.3 %
RED BLOOD CELLS URINE: 1 /HPF
SODIUM SERPL-SCNC: 141 MMOL/L
SPECIFIC GRAVITY URINE: 1.03
T4 FREE SERPL-MCNC: 1 NG/DL
TIBC SERPL-MCNC: 327 UG/DL
TRIGL SERPL-MCNC: 78 MG/DL
TSH SERPL-ACNC: 1.08 UIU/ML
UIBC SERPL-MCNC: 216 UG/DL
UROBILINOGEN URINE: 0.2 MG/DL
VIT B12 SERPL-MCNC: 375 PG/ML
WBC # FLD AUTO: 5.25 K/UL
WHITE BLOOD CELLS URINE: 5 /HPF

## 2024-12-26 ENCOUNTER — NON-APPOINTMENT (OUTPATIENT)
Age: 59
End: 2024-12-26

## 2024-12-27 ENCOUNTER — APPOINTMENT (OUTPATIENT)
Dept: CARDIOLOGY | Facility: CLINIC | Age: 59
End: 2024-12-27
Payer: COMMERCIAL

## 2024-12-27 PROCEDURE — 93306 TTE W/DOPPLER COMPLETE: CPT

## 2025-01-06 ENCOUNTER — APPOINTMENT (OUTPATIENT)
Dept: PULMONOLOGY | Facility: CLINIC | Age: 60
End: 2025-01-06
Payer: COMMERCIAL

## 2025-01-06 VITALS
OXYGEN SATURATION: 99 % | DIASTOLIC BLOOD PRESSURE: 84 MMHG | RESPIRATION RATE: 16 BRPM | HEART RATE: 86 BPM | SYSTOLIC BLOOD PRESSURE: 127 MMHG

## 2025-01-06 DIAGNOSIS — G47.33 OBSTRUCTIVE SLEEP APNEA (ADULT) (PEDIATRIC): ICD-10-CM

## 2025-01-06 DIAGNOSIS — J45.909 UNSPECIFIED ASTHMA, UNCOMPLICATED: ICD-10-CM

## 2025-01-06 PROCEDURE — 99214 OFFICE O/P EST MOD 30 MIN: CPT | Mod: 25

## 2025-01-06 PROCEDURE — 94060 EVALUATION OF WHEEZING: CPT

## 2025-01-06 PROCEDURE — 71046 X-RAY EXAM CHEST 2 VIEWS: CPT

## 2025-01-14 ENCOUNTER — RESULT REVIEW (OUTPATIENT)
Age: 60
End: 2025-01-14

## 2025-01-14 ENCOUNTER — APPOINTMENT (OUTPATIENT)
Dept: ULTRASOUND IMAGING | Facility: CLINIC | Age: 60
End: 2025-01-14
Payer: COMMERCIAL

## 2025-01-14 ENCOUNTER — APPOINTMENT (OUTPATIENT)
Dept: MAMMOGRAPHY | Facility: CLINIC | Age: 60
End: 2025-01-14
Payer: COMMERCIAL

## 2025-01-14 PROCEDURE — G0279: CPT

## 2025-01-14 PROCEDURE — 76641 ULTRASOUND BREAST COMPLETE: CPT | Mod: 50

## 2025-01-14 PROCEDURE — 77062 BREAST TOMOSYNTHESIS BI: CPT

## 2025-01-14 PROCEDURE — 77066 DX MAMMO INCL CAD BI: CPT

## 2025-01-21 ENCOUNTER — NON-APPOINTMENT (OUTPATIENT)
Age: 60
End: 2025-01-21

## 2025-01-22 ENCOUNTER — APPOINTMENT (OUTPATIENT)
Dept: SURGICAL ONCOLOGY | Facility: CLINIC | Age: 60
End: 2025-01-22
Payer: COMMERCIAL

## 2025-01-22 ENCOUNTER — NON-APPOINTMENT (OUTPATIENT)
Age: 60
End: 2025-01-22

## 2025-01-22 DIAGNOSIS — M94.0 CHONDROCOSTAL JUNCTION SYNDROME [TIETZE]: ICD-10-CM

## 2025-01-22 PROCEDURE — 99204 OFFICE O/P NEW MOD 45 MIN: CPT

## 2025-01-22 RX ORDER — IBUPROFEN 600 MG/1
600 TABLET, FILM COATED ORAL
Qty: 60 | Refills: 3 | Status: ACTIVE | COMMUNITY
Start: 2025-01-22 | End: 1900-01-01

## 2025-02-20 ENCOUNTER — APPOINTMENT (OUTPATIENT)
Dept: PULMONOLOGY | Facility: CLINIC | Age: 60
End: 2025-02-20
Payer: COMMERCIAL

## 2025-02-20 VITALS — SYSTOLIC BLOOD PRESSURE: 111 MMHG | OXYGEN SATURATION: 97 % | DIASTOLIC BLOOD PRESSURE: 73 MMHG | HEART RATE: 82 BPM

## 2025-02-20 DIAGNOSIS — J45.909 UNSPECIFIED ASTHMA, UNCOMPLICATED: ICD-10-CM

## 2025-02-20 DIAGNOSIS — G47.33 OBSTRUCTIVE SLEEP APNEA (ADULT) (PEDIATRIC): ICD-10-CM

## 2025-02-20 PROCEDURE — 99214 OFFICE O/P EST MOD 30 MIN: CPT | Mod: 25

## 2025-02-20 PROCEDURE — 94060 EVALUATION OF WHEEZING: CPT

## 2025-04-08 ENCOUNTER — TRANSCRIPTION ENCOUNTER (OUTPATIENT)
Age: 60
End: 2025-04-08

## 2025-04-16 ENCOUNTER — APPOINTMENT (OUTPATIENT)
Dept: CARDIOLOGY | Facility: CLINIC | Age: 60
End: 2025-04-16
Payer: COMMERCIAL

## 2025-04-16 ENCOUNTER — LABORATORY RESULT (OUTPATIENT)
Age: 60
End: 2025-04-16

## 2025-04-16 VITALS
OXYGEN SATURATION: 99 % | SYSTOLIC BLOOD PRESSURE: 112 MMHG | DIASTOLIC BLOOD PRESSURE: 80 MMHG | HEIGHT: 69 IN | HEART RATE: 67 BPM | BODY MASS INDEX: 34.96 KG/M2 | WEIGHT: 236 LBS | TEMPERATURE: 97.7 F

## 2025-04-16 DIAGNOSIS — E78.00 PURE HYPERCHOLESTEROLEMIA, UNSPECIFIED: ICD-10-CM

## 2025-04-16 DIAGNOSIS — G47.30 SLEEP APNEA, UNSPECIFIED: ICD-10-CM

## 2025-04-16 DIAGNOSIS — I10 ESSENTIAL (PRIMARY) HYPERTENSION: ICD-10-CM

## 2025-04-16 DIAGNOSIS — M79.89 OTHER SPECIFIED SOFT TISSUE DISORDERS: ICD-10-CM

## 2025-04-16 DIAGNOSIS — R53.83 OTHER FATIGUE: ICD-10-CM

## 2025-04-16 DIAGNOSIS — E55.9 VITAMIN D DEFICIENCY, UNSPECIFIED: ICD-10-CM

## 2025-04-16 DIAGNOSIS — R82.90 UNSPECIFIED ABNORMAL FINDINGS IN URINE: ICD-10-CM

## 2025-04-16 DIAGNOSIS — E66.01 MORBID (SEVERE) OBESITY DUE TO EXCESS CALORIES: ICD-10-CM

## 2025-04-16 DIAGNOSIS — Z13.228 ENCOUNTER FOR SCREENING FOR OTHER METABOLIC DISORDERS: ICD-10-CM

## 2025-04-16 DIAGNOSIS — Z13.820 ENCOUNTER FOR SCREENING FOR OSTEOPOROSIS: ICD-10-CM

## 2025-04-16 PROBLEM — K21.9 GASTROESOPHAGEAL REFLUX DISEASE, UNSPECIFIED WHETHER ESOPHAGITIS PRESENT: Status: ACTIVE | Noted: 2025-04-16

## 2025-04-16 LAB
25(OH)D3 SERPL-MCNC: 39.3 NG/ML
ALBUMIN SERPL ELPH-MCNC: 4.3 G/DL
ALP BLD-CCNC: 82 U/L
ALT SERPL-CCNC: 12 U/L
ANION GAP SERPL CALC-SCNC: 13 MMOL/L
APPEARANCE: CLEAR
AST SERPL-CCNC: 12 U/L
BACTERIA: NEGATIVE /HPF
BASOPHILS # BLD AUTO: 0.03 K/UL
BASOPHILS NFR BLD AUTO: 0.6 %
BILIRUB SERPL-MCNC: 0.4 MG/DL
BILIRUBIN URINE: NEGATIVE
BLOOD URINE: NEGATIVE
BUN SERPL-MCNC: 13 MG/DL
CALCIUM SERPL-MCNC: 9.9 MG/DL
CAST: 0 /LPF
CHLORIDE SERPL-SCNC: 105 MMOL/L
CHOLEST SERPL-MCNC: 185 MG/DL
CK SERPL-CCNC: 77 U/L
CO2 SERPL-SCNC: 25 MMOL/L
COLOR: YELLOW
CREAT SERPL-MCNC: 0.95 MG/DL
CREAT SPEC-SCNC: 104 MG/DL
EGFRCR SERPLBLD CKD-EPI 2021: 69 ML/MIN/1.73M2
EOSINOPHIL # BLD AUTO: 0.21 K/UL
EOSINOPHIL NFR BLD AUTO: 4.1 %
EPITHELIAL CELLS: 2 /HPF
ESTIMATED AVERAGE GLUCOSE: 105 MG/DL
FERRITIN SERPL-MCNC: 229 NG/ML
FOLATE SERPL-MCNC: 11.3 NG/ML
GLUCOSE QUALITATIVE U: NEGATIVE MG/DL
GLUCOSE SERPL-MCNC: 91 MG/DL
HAPTOGLOB SERPL-MCNC: 155 MG/DL
HBA1C MFR BLD HPLC: 5.3 %
HCT VFR BLD CALC: 38.3 %
HDLC SERPL-MCNC: 72 MG/DL
HGB BLD-MCNC: 11.7 G/DL
IMM GRANULOCYTES NFR BLD AUTO: 0.2 %
IRON SERPL-MCNC: 112 UG/DL
KETONES URINE: NEGATIVE MG/DL
LDH SERPL-CCNC: 162 U/L
LDLC SERPL DIRECT ASSAY-MCNC: 95 MG/DL
LDLC SERPL-MCNC: 99 MG/DL
LEUKOCYTE ESTERASE URINE: ABNORMAL
LYMPHOCYTES # BLD AUTO: 1.95 K/UL
LYMPHOCYTES NFR BLD AUTO: 37.6 %
MAGNESIUM SERPL-MCNC: 2 MG/DL
MAN DIFF?: NORMAL
MCHC RBC-ENTMCNC: 29.3 PG
MCHC RBC-ENTMCNC: 30.5 G/DL
MCV RBC AUTO: 95.8 FL
MICROALBUMIN 24H UR DL<=1MG/L-MCNC: 1.5 MG/DL
MICROALBUMIN/CREAT 24H UR-RTO: 14 MG/G
MICROSCOPIC-UA: NORMAL
MONOCYTES # BLD AUTO: 0.4 K/UL
MONOCYTES NFR BLD AUTO: 7.7 %
NEUTROPHILS # BLD AUTO: 2.58 K/UL
NEUTROPHILS NFR BLD AUTO: 49.8 %
NITRITE URINE: NEGATIVE
NONHDLC SERPL-MCNC: 112 MG/DL
NT-PROBNP SERPL-MCNC: <36 PG/ML
PH URINE: 6
PLATELET # BLD AUTO: 269 K/UL
POTASSIUM SERPL-SCNC: 4.8 MMOL/L
PROT SERPL-MCNC: 7.1 G/DL
PROTEIN URINE: NEGATIVE MG/DL
RBC # BLD: 4 M/UL
RBC # FLD: 12.6 %
RED BLOOD CELLS URINE: 0 /HPF
SODIUM SERPL-SCNC: 143 MMOL/L
SPECIFIC GRAVITY URINE: 1.02
TRANSFERRIN SERPL-MCNC: 243 MG/DL
TRIGL SERPL-MCNC: 74 MG/DL
URATE SERPL-MCNC: 4.8 MG/DL
UROBILINOGEN URINE: 0.2 MG/DL
VIT B12 SERPL-MCNC: 365 PG/ML
WBC # FLD AUTO: 5.18 K/UL
WHITE BLOOD CELLS URINE: 1 /HPF

## 2025-04-16 PROCEDURE — G2211 COMPLEX E/M VISIT ADD ON: CPT | Mod: NC

## 2025-04-16 PROCEDURE — 93000 ELECTROCARDIOGRAM COMPLETE: CPT

## 2025-04-16 PROCEDURE — 93970 EXTREMITY STUDY: CPT

## 2025-04-16 PROCEDURE — 99214 OFFICE O/P EST MOD 30 MIN: CPT

## 2025-04-23 ENCOUNTER — NON-APPOINTMENT (OUTPATIENT)
Age: 60
End: 2025-04-23

## 2025-04-23 ENCOUNTER — APPOINTMENT (OUTPATIENT)
Dept: GASTROENTEROLOGY | Facility: CLINIC | Age: 60
End: 2025-04-23
Payer: COMMERCIAL

## 2025-04-23 VITALS — WEIGHT: 237 LBS | HEIGHT: 69 IN | RESPIRATION RATE: 16 BRPM | BODY MASS INDEX: 35.1 KG/M2

## 2025-04-23 DIAGNOSIS — Z12.11 ENCOUNTER FOR SCREENING FOR MALIGNANT NEOPLASM OF COLON: ICD-10-CM

## 2025-04-23 DIAGNOSIS — Z00.00 ENCOUNTER FOR GENERAL ADULT MEDICAL EXAMINATION W/OUT ABNORMAL FINDINGS: ICD-10-CM

## 2025-04-23 DIAGNOSIS — K21.9 GASTRO-ESOPHAGEAL REFLUX DISEASE W/OUT ESOPHAGITIS: ICD-10-CM

## 2025-04-23 DIAGNOSIS — R11.0 NAUSEA: ICD-10-CM

## 2025-04-23 LAB — BACTERIA UR CULT: NORMAL

## 2025-04-23 PROCEDURE — 99214 OFFICE O/P EST MOD 30 MIN: CPT

## 2025-04-23 RX ORDER — TIRZEPATIDE 2.5 MG/.5ML
2.5 INJECTION, SOLUTION SUBCUTANEOUS
Qty: 4 | Refills: 2 | Status: ACTIVE | COMMUNITY
Start: 2025-04-23 | End: 1900-01-01

## 2025-04-23 RX ORDER — SODIUM PICOSULFATE, MAGNESIUM OXIDE, AND ANHYDROUS CITRIC ACID 12; 3.5; 1 G/175ML; G/175ML; MG/175ML
10-3.5-12 MG-GM LIQUID ORAL
Qty: 2 | Refills: 0 | Status: ACTIVE | COMMUNITY
Start: 2025-04-23 | End: 1900-01-01

## 2025-04-23 RX ORDER — FAMOTIDINE 40 MG/1
40 TABLET, FILM COATED ORAL
Qty: 90 | Refills: 1 | Status: ACTIVE | COMMUNITY
Start: 2025-04-23 | End: 1900-01-01

## 2025-04-24 ENCOUNTER — APPOINTMENT (OUTPATIENT)
Dept: ULTRASOUND IMAGING | Facility: IMAGING CENTER | Age: 60
End: 2025-04-24
Payer: COMMERCIAL

## 2025-04-24 ENCOUNTER — OUTPATIENT (OUTPATIENT)
Dept: OUTPATIENT SERVICES | Facility: HOSPITAL | Age: 60
LOS: 1 days | End: 2025-04-24

## 2025-04-24 ENCOUNTER — OUTPATIENT (OUTPATIENT)
Dept: OUTPATIENT SERVICES | Facility: HOSPITAL | Age: 60
LOS: 1 days | End: 2025-04-24
Payer: COMMERCIAL

## 2025-04-24 DIAGNOSIS — R11.0 NAUSEA: ICD-10-CM

## 2025-04-24 DIAGNOSIS — Z00.8 ENCOUNTER FOR OTHER GENERAL EXAMINATION: ICD-10-CM

## 2025-04-24 DIAGNOSIS — R14.0 ABDOMINAL DISTENSION (GASEOUS): ICD-10-CM

## 2025-04-24 PROCEDURE — 76700 US EXAM ABDOM COMPLETE: CPT

## 2025-04-24 PROCEDURE — 76700 US EXAM ABDOM COMPLETE: CPT | Mod: 26

## 2025-04-29 ENCOUNTER — APPOINTMENT (OUTPATIENT)
Dept: RADIOLOGY | Facility: IMAGING CENTER | Age: 60
End: 2025-04-29

## 2025-04-29 ENCOUNTER — OUTPATIENT (OUTPATIENT)
Dept: OUTPATIENT SERVICES | Facility: HOSPITAL | Age: 60
LOS: 1 days | End: 2025-04-29
Payer: COMMERCIAL

## 2025-04-29 DIAGNOSIS — Z13.820 ENCOUNTER FOR SCREENING FOR OSTEOPOROSIS: ICD-10-CM

## 2025-04-29 PROCEDURE — 77085 DXA BONE DENSITY AXL VRT FX: CPT

## 2025-04-29 PROCEDURE — 77085 DXA BONE DENSITY AXL VRT FX: CPT | Mod: 26

## 2025-05-07 ENCOUNTER — NON-APPOINTMENT (OUTPATIENT)
Age: 60
End: 2025-05-07

## 2025-05-12 ENCOUNTER — APPOINTMENT (OUTPATIENT)
Dept: CARDIOLOGY | Facility: CLINIC | Age: 60
End: 2025-05-12
Payer: COMMERCIAL

## 2025-05-12 DIAGNOSIS — E87.6 HYPOKALEMIA: ICD-10-CM

## 2025-05-12 DIAGNOSIS — R03.0 ELEVATED BLOOD-PRESSURE READING, W/OUT DIAGNOSIS OF HYPERTENSION: ICD-10-CM

## 2025-05-12 DIAGNOSIS — I10 ESSENTIAL (PRIMARY) HYPERTENSION: ICD-10-CM

## 2025-05-12 DIAGNOSIS — G47.33 OBSTRUCTIVE SLEEP APNEA (ADULT) (PEDIATRIC): ICD-10-CM

## 2025-05-12 PROCEDURE — 99214 OFFICE O/P EST MOD 30 MIN: CPT | Mod: 95

## 2025-05-12 PROCEDURE — G2211 COMPLEX E/M VISIT ADD ON: CPT | Mod: NC,95

## 2025-05-29 ENCOUNTER — APPOINTMENT (OUTPATIENT)
Dept: CARDIOLOGY | Facility: CLINIC | Age: 60
End: 2025-05-29
Payer: COMMERCIAL

## 2025-05-29 PROCEDURE — 97802 MEDICAL NUTRITION INDIV IN: CPT | Mod: 95

## 2025-06-12 ENCOUNTER — APPOINTMENT (OUTPATIENT)
Dept: GASTROENTEROLOGY | Facility: AMBULATORY SURGERY CENTER | Age: 60
End: 2025-06-12
Payer: COMMERCIAL

## 2025-06-12 ENCOUNTER — RESULT REVIEW (OUTPATIENT)
Age: 60
End: 2025-06-12

## 2025-06-12 PROCEDURE — 45380 COLONOSCOPY AND BIOPSY: CPT | Mod: 33

## 2025-06-12 PROCEDURE — 43239 EGD BIOPSY SINGLE/MULTIPLE: CPT | Mod: 59

## 2025-06-18 ENCOUNTER — NON-APPOINTMENT (OUTPATIENT)
Age: 60
End: 2025-06-18

## 2025-07-09 ENCOUNTER — APPOINTMENT (OUTPATIENT)
Dept: CARDIOLOGY | Facility: CLINIC | Age: 60
End: 2025-07-09
Payer: COMMERCIAL

## 2025-07-09 PROCEDURE — 97803 MED NUTRITION INDIV SUBSEQ: CPT | Mod: 95

## 2025-07-16 ENCOUNTER — APPOINTMENT (OUTPATIENT)
Dept: PULMONOLOGY | Facility: CLINIC | Age: 60
End: 2025-07-16
Payer: COMMERCIAL

## 2025-07-16 VITALS — DIASTOLIC BLOOD PRESSURE: 77 MMHG | OXYGEN SATURATION: 99 % | SYSTOLIC BLOOD PRESSURE: 125 MMHG | HEART RATE: 82 BPM

## 2025-07-16 PROCEDURE — 99214 OFFICE O/P EST MOD 30 MIN: CPT | Mod: 25

## 2025-07-16 PROCEDURE — 94010 BREATHING CAPACITY TEST: CPT

## 2025-08-08 ENCOUNTER — APPOINTMENT (OUTPATIENT)
Dept: CARDIOLOGY | Facility: CLINIC | Age: 60
End: 2025-08-08

## 2025-08-08 DIAGNOSIS — N18.9 CHRONIC KIDNEY DISEASE, UNSPECIFIED: ICD-10-CM

## 2025-08-08 PROCEDURE — 97803 MED NUTRITION INDIV SUBSEQ: CPT | Mod: 95
